# Patient Record
Sex: MALE | Race: WHITE | NOT HISPANIC OR LATINO | Employment: FULL TIME | URBAN - METROPOLITAN AREA
[De-identification: names, ages, dates, MRNs, and addresses within clinical notes are randomized per-mention and may not be internally consistent; named-entity substitution may affect disease eponyms.]

---

## 2018-04-05 ENCOUNTER — SURGERY - HEALTHEAST (OUTPATIENT)
Dept: CARDIOLOGY | Facility: CLINIC | Age: 53
End: 2018-04-05

## 2018-04-05 ENCOUNTER — HOSPITAL ENCOUNTER (OUTPATIENT)
Dept: LAB | Age: 53
Setting detail: SPECIMEN
Discharge: HOME | End: 2018-04-05

## 2018-04-05 ENCOUNTER — OFFICE VISIT - HEALTHEAST (OUTPATIENT)
Dept: FAMILY MEDICINE | Facility: CLINIC | Age: 53
End: 2018-04-05

## 2018-04-05 ENCOUNTER — COMMUNICATION - HEALTHEAST (OUTPATIENT)
Dept: FAMILY MEDICINE | Facility: CLINIC | Age: 53
End: 2018-04-05

## 2018-04-05 ENCOUNTER — RECORDS - HEALTHEAST (OUTPATIENT)
Dept: GENERAL RADIOLOGY | Facility: CLINIC | Age: 53
End: 2018-04-05

## 2018-04-05 DIAGNOSIS — R53.83 FATIGUE: ICD-10-CM

## 2018-04-05 DIAGNOSIS — R06.02 SHORTNESS OF BREATH: ICD-10-CM

## 2018-04-05 DIAGNOSIS — R05.9 COUGH: ICD-10-CM

## 2018-04-05 DIAGNOSIS — R09.89 PULMONARY CONGESTION: ICD-10-CM

## 2018-04-05 DIAGNOSIS — R94.31 ABNORMAL EKG: ICD-10-CM

## 2018-04-05 DIAGNOSIS — R06.02 SOB (SHORTNESS OF BREATH): ICD-10-CM

## 2018-04-05 LAB
ALBUMIN SERPL-MCNC: 2.6 G/DL (ref 3.5–5)
ALP SERPL-CCNC: 213 U/L (ref 45–120)
ALT SERPL W P-5'-P-CCNC: 47 U/L (ref 0–45)
ANION GAP SERPL CALCULATED.3IONS-SCNC: 11 MMOL/L (ref 5–18)
AST SERPL W P-5'-P-CCNC: 33 U/L (ref 0–40)
ATRIAL RATE - MUSE: 104 BPM
BASOPHILS # BLD AUTO: 0.1 THOU/UL (ref 0–0.2)
BASOPHILS NFR BLD AUTO: 1 % (ref 0–2)
BILIRUB SERPL-MCNC: 1.4 MG/DL (ref 0–1)
BUN SERPL-MCNC: 16 MG/DL (ref 8–22)
CALCIUM SERPL-MCNC: 9.1 MG/DL (ref 8.5–10.5)
CHLORIDE BLD-SCNC: 105 MMOL/L (ref 98–107)
CO2 SERPL-SCNC: 20 MMOL/L (ref 22–31)
CREAT SERPL-MCNC: 0.89 MG/DL (ref 0.7–1.3)
DIASTOLIC BLOOD PRESSURE - MUSE: NORMAL MMHG
EOSINOPHIL # BLD AUTO: 0.2 THOU/UL (ref 0–0.4)
EOSINOPHIL NFR BLD AUTO: 2 % (ref 0–6)
ERYTHROCYTE [DISTWIDTH] IN BLOOD BY AUTOMATED COUNT: 12.2 % (ref 11–14.5)
GFR SERPL CREATININE-BSD FRML MDRD: >60 ML/MIN/1.73M2
GLUCOSE BLD-MCNC: 90 MG/DL (ref 70–125)
HCT VFR BLD AUTO: 39.7 % (ref 40–54)
HGB BLD-MCNC: 13.3 G/DL (ref 14–18)
INTERPRETATION ECG - MUSE: NORMAL
LYMPHOCYTES # BLD AUTO: 2 THOU/UL (ref 0.8–4.4)
LYMPHOCYTES NFR BLD AUTO: 19 % (ref 20–40)
MCH RBC QN AUTO: 28.8 PG (ref 27–34)
MCHC RBC AUTO-ENTMCNC: 33.5 G/DL (ref 32–36)
MCV RBC AUTO: 86 FL (ref 80–100)
MONOCYTES # BLD AUTO: 0.9 THOU/UL (ref 0–0.9)
MONOCYTES NFR BLD AUTO: 9 % (ref 2–10)
NEUTROPHILS # BLD AUTO: 7.4 THOU/UL (ref 2–7.7)
NEUTROPHILS NFR BLD AUTO: 70 % (ref 50–70)
P AXIS - MUSE: 52 DEGREES
PLATELET # BLD AUTO: 462 THOU/UL (ref 140–440)
PMV BLD AUTO: 7.3 FL (ref 7–10)
POTASSIUM BLD-SCNC: 5 MMOL/L (ref 3.5–5)
PR INTERVAL - MUSE: 162 MS
PROT SERPL-MCNC: 6.3 G/DL (ref 6–8)
QRS DURATION - MUSE: 80 MS
QT - MUSE: 346 MS
QTC - MUSE: 454 MS
R AXIS - MUSE: 247 DEGREES
RBC # BLD AUTO: 4.62 MILL/UL (ref 4.4–6.2)
SODIUM SERPL-SCNC: 136 MMOL/L (ref 136–145)
SYSTOLIC BLOOD PRESSURE - MUSE: NORMAL MMHG
T AXIS - MUSE: 62 DEGREES
VENTRICULAR RATE- MUSE: 104 BPM
WBC: 10.6 THOU/UL (ref 4–11)

## 2018-04-05 ASSESSMENT — MIFFLIN-ST. JEOR: SCORE: 1758.38

## 2018-04-06 ENCOUNTER — COMMUNICATION - HEALTHEAST (OUTPATIENT)
Dept: CARDIOLOGY | Facility: CLINIC | Age: 53
End: 2018-04-06

## 2018-04-06 DIAGNOSIS — I24.9 ACS (ACUTE CORONARY SYNDROME) (H): ICD-10-CM

## 2018-04-06 DIAGNOSIS — I42.9 CARDIOMYOPATHY (H): ICD-10-CM

## 2018-04-06 ASSESSMENT — MIFFLIN-ST. JEOR: SCORE: 1738.45

## 2018-04-07 ASSESSMENT — MIFFLIN-ST. JEOR: SCORE: 1738.45

## 2018-04-10 ENCOUNTER — OFFICE VISIT - HEALTHEAST (OUTPATIENT)
Dept: FAMILY MEDICINE | Facility: CLINIC | Age: 53
End: 2018-04-10

## 2018-04-10 DIAGNOSIS — R04.0 NOSEBLEED: ICD-10-CM

## 2018-04-10 DIAGNOSIS — Z23 NEED FOR VACCINATION: ICD-10-CM

## 2018-04-10 DIAGNOSIS — Z09 HOSPITAL DISCHARGE FOLLOW-UP: ICD-10-CM

## 2018-04-10 DIAGNOSIS — I50.21 ACUTE SYSTOLIC CONGESTIVE HEART FAILURE (H): ICD-10-CM

## 2018-04-10 DIAGNOSIS — I21.3 STEMI (ST ELEVATION MYOCARDIAL INFARCTION) (H): ICD-10-CM

## 2018-04-10 ASSESSMENT — MIFFLIN-ST. JEOR: SCORE: 1691.39

## 2018-04-19 ENCOUNTER — COMMUNICATION - HEALTHEAST (OUTPATIENT)
Dept: TELEHEALTH | Facility: CLINIC | Age: 53
End: 2018-04-19

## 2018-04-19 ENCOUNTER — OFFICE VISIT - HEALTHEAST (OUTPATIENT)
Dept: CARDIOLOGY | Facility: CLINIC | Age: 53
End: 2018-04-19

## 2018-04-19 DIAGNOSIS — I25.5 ISCHEMIC CARDIOMYOPATHY: ICD-10-CM

## 2018-04-19 DIAGNOSIS — I21.02 ST ELEVATION MYOCARDIAL INFARCTION INVOLVING LEFT ANTERIOR DESCENDING (LAD) CORONARY ARTERY (H): ICD-10-CM

## 2018-04-19 DIAGNOSIS — I50.21 ACUTE SYSTOLIC CONGESTIVE HEART FAILURE (H): ICD-10-CM

## 2018-04-19 DIAGNOSIS — I24.9 ACS (ACUTE CORONARY SYNDROME) (H): ICD-10-CM

## 2018-04-19 DIAGNOSIS — I25.10 CORONARY ARTERY DISEASE DUE TO LIPID RICH PLAQUE: ICD-10-CM

## 2018-04-19 DIAGNOSIS — E78.5 DYSLIPIDEMIA: ICD-10-CM

## 2018-04-19 DIAGNOSIS — I25.83 CORONARY ARTERY DISEASE DUE TO LIPID RICH PLAQUE: ICD-10-CM

## 2018-04-19 ASSESSMENT — MIFFLIN-ST. JEOR: SCORE: 1680.5

## 2018-04-20 ENCOUNTER — HOSPITAL ENCOUNTER (OUTPATIENT)
Dept: MRI IMAGING | Facility: HOSPITAL | Age: 53
Discharge: HOME | End: 2018-04-20
Attending: INTERNAL MEDICINE

## 2018-04-20 DIAGNOSIS — I24.9 ACS (ACUTE CORONARY SYNDROME) (H): ICD-10-CM

## 2018-04-20 DIAGNOSIS — I42.9 CARDIOMYOPATHY (H): ICD-10-CM

## 2018-04-20 LAB
CCTA EJECTION FRACTION: 37 %
CCTA INTERVENTRICULAR SETPUM: 0.9 CM (ref 0.6–1.1)
CCTA LEFT INTERNAL DIMENSION IN SYSTOLE: 4.3 CM (ref 2.1–4)
CCTA LEFT VENTRICULAR INTERNAL DIMENSION IN DIASTOLE: 5.5 CM (ref 3.5–6)
CCTA LEFT VENTRICULAR MASS: 199.32 G
CCTA POSTERIOR WALL: 1 CM (ref 0.6–1.1)
MR CARDIAC LEFT VENTRIAL CARDIAC INDEX: 2.2 L/MIN/M2 (ref 1.7–4.2)
MR CARDIAC LEFT VENTRICAL CARDIAC OUTPUT: 4.48 L/MIN (ref 2.8–8.8)
MR CARDIAC LEFT VENTRICULAR DIASTOLIC VOLUME INDEX: 124.56 ML/M2 (ref 47–92)
MR CARDIAC LEFT VENTRICULAR MASS INDEX: 94.01 G/M2 (ref 70–113)
MR CARDIAC LEFT VENTRICULAR MASS: 189 G (ref 118–238)
MR CARDIAC LEFT VENTRICULAR STROKE VOLUME INDEX: 27.06 ML/M2 (ref 32–62)
MR CARDIAC LEFT VENTRICULAR SYSTOLIC VOLUME INDEX: 97.5 ML/M2 (ref 13–30)
MR EJECTION FRACTION: 21.73 %
MR HEIGHT: 1.7 M
MR LEFT VENTRICULAR DYSTOLIC VOLUMEN: 250.4 ML (ref 77–195)
MR LEFT VENTRICULAR STROKE VOLUMEN: 54.4 ML (ref 51–133)
MR LEFT VENTRICULAR SYSTOLIC VOLUME: 196 ML (ref 19–72)
MR WEIGHT: 90.1 KG

## 2018-05-01 ENCOUNTER — COMMUNICATION - HEALTHEAST (OUTPATIENT)
Dept: CARDIOLOGY | Facility: CLINIC | Age: 53
End: 2018-05-01

## 2018-05-15 ENCOUNTER — OFFICE VISIT - HEALTHEAST (OUTPATIENT)
Dept: CARDIOLOGY | Facility: CLINIC | Age: 53
End: 2018-05-15

## 2018-05-15 DIAGNOSIS — I25.5 ISCHEMIC CARDIOMYOPATHY: ICD-10-CM

## 2018-05-15 DIAGNOSIS — I50.20 HEART FAILURE WITH REDUCED EJECTION FRACTION, NYHA CLASS II (H): ICD-10-CM

## 2018-05-15 ASSESSMENT — MIFFLIN-ST. JEOR: SCORE: 1668.14

## 2018-05-22 ENCOUNTER — COMMUNICATION - HEALTHEAST (OUTPATIENT)
Dept: CARDIOLOGY | Facility: CLINIC | Age: 53
End: 2018-05-22

## 2018-05-23 ENCOUNTER — HOSPITAL ENCOUNTER (OUTPATIENT)
Dept: CARDIOLOGY | Facility: CLINIC | Age: 53
Discharge: HOME | End: 2018-05-23
Attending: INTERNAL MEDICINE

## 2018-05-23 ENCOUNTER — OFFICE VISIT - HEALTHEAST (OUTPATIENT)
Dept: CARDIOLOGY | Facility: CLINIC | Age: 53
End: 2018-05-23

## 2018-05-23 DIAGNOSIS — I50.21 ACUTE SYSTOLIC CONGESTIVE HEART FAILURE (H): ICD-10-CM

## 2018-05-23 DIAGNOSIS — I25.10 CORONARY ARTERY DISEASE DUE TO LIPID RICH PLAQUE: ICD-10-CM

## 2018-05-23 DIAGNOSIS — I25.83 CORONARY ARTERY DISEASE DUE TO LIPID RICH PLAQUE: ICD-10-CM

## 2018-05-23 DIAGNOSIS — I25.5 ISCHEMIC CARDIOMYOPATHY: ICD-10-CM

## 2018-05-23 DIAGNOSIS — I50.22 CHRONIC SYSTOLIC HEART FAILURE (H): ICD-10-CM

## 2018-05-23 ASSESSMENT — MIFFLIN-ST. JEOR: SCORE: 1677.22

## 2018-05-31 ENCOUNTER — OFFICE VISIT - HEALTHEAST (OUTPATIENT)
Dept: FAMILY MEDICINE | Facility: CLINIC | Age: 53
End: 2018-05-31

## 2018-05-31 DIAGNOSIS — J32.9 SINUSITIS: ICD-10-CM

## 2018-06-01 ENCOUNTER — COMMUNICATION - HEALTHEAST (OUTPATIENT)
Dept: CARDIOLOGY | Facility: CLINIC | Age: 53
End: 2018-06-01

## 2018-06-05 ENCOUNTER — HOSPITAL ENCOUNTER (OUTPATIENT)
Dept: LAB | Age: 53
Setting detail: SPECIMEN
Discharge: HOME | End: 2018-06-05

## 2018-06-05 ENCOUNTER — OFFICE VISIT - HEALTHEAST (OUTPATIENT)
Dept: CARDIOLOGY | Facility: CLINIC | Age: 53
End: 2018-06-05

## 2018-06-05 ENCOUNTER — AMBULATORY - HEALTHEAST (OUTPATIENT)
Dept: CARDIOLOGY | Facility: CLINIC | Age: 53
End: 2018-06-05

## 2018-06-05 DIAGNOSIS — I25.10 CAD (CORONARY ARTERY DISEASE): ICD-10-CM

## 2018-06-05 DIAGNOSIS — Z00.6 EXAMINATION OF PARTICIPANT IN CLINICAL TRIAL: ICD-10-CM

## 2018-06-05 DIAGNOSIS — I21.02 ST ELEVATION MYOCARDIAL INFARCTION INVOLVING LEFT ANTERIOR DESCENDING (LAD) CORONARY ARTERY (H): ICD-10-CM

## 2018-06-05 DIAGNOSIS — I10 BENIGN ESSENTIAL HYPERTENSION: ICD-10-CM

## 2018-06-05 DIAGNOSIS — I25.5 ISCHEMIC CARDIOMYOPATHY: ICD-10-CM

## 2018-06-05 DIAGNOSIS — I50.20 HEART FAILURE WITH REDUCED EJECTION FRACTION, NYHA CLASS II (H): ICD-10-CM

## 2018-06-05 DIAGNOSIS — I50.22 CHRONIC SYSTOLIC HEART FAILURE (H): Primary | ICD-10-CM

## 2018-06-05 DIAGNOSIS — I50.22 CHRONIC SYSTOLIC HEART FAILURE (H): ICD-10-CM

## 2018-06-05 DIAGNOSIS — E78.00 PURE HYPERCHOLESTEROLEMIA: ICD-10-CM

## 2018-06-05 DIAGNOSIS — Z95.810 ICD (IMPLANTABLE CARDIOVERTER-DEFIBRILLATOR), SINGLE, IN SITU: ICD-10-CM

## 2018-06-05 DIAGNOSIS — I25.118 CORONARY ARTERY DISEASE OF NATIVE HEART WITH STABLE ANGINA PECTORIS, UNSPECIFIED VESSEL OR LESION TYPE (H): ICD-10-CM

## 2018-06-05 LAB
ANION GAP SERPL CALCULATED.3IONS-SCNC: 8 MMOL/L (ref 5–18)
BUN SERPL-MCNC: 13 MG/DL (ref 8–22)
CALCIUM SERPL-MCNC: 9.7 MG/DL (ref 8.5–10.5)
CHLORIDE BLD-SCNC: 107 MMOL/L (ref 98–107)
CO2 SERPL-SCNC: 24 MMOL/L (ref 22–31)
CREAT SERPL-MCNC: 0.91 MG/DL (ref 0.7–1.3)
GFR SERPL CREATININE-BSD FRML MDRD: >60 ML/MIN/1.73M2
GLUCOSE BLD-MCNC: 99 MG/DL (ref 70–125)
PLATELET # BLD AUTO: 354 THOU/UL (ref 140–440)
POTASSIUM BLD-SCNC: 4.8 MMOL/L (ref 3.5–5)
SODIUM SERPL-SCNC: 139 MMOL/L (ref 136–145)

## 2018-06-05 RX ORDER — SENNOSIDES 8.6 MG
650 CAPSULE ORAL EVERY 8 HOURS PRN
Status: SHIPPED | COMMUNITY
Start: 2018-04-06 | End: 2022-05-16

## 2018-06-05 ASSESSMENT — MIFFLIN-ST. JEOR: SCORE: 1659.07

## 2018-06-08 ENCOUNTER — COMMUNICATION - HEALTHEAST (OUTPATIENT)
Dept: CARDIOLOGY | Facility: CLINIC | Age: 53
End: 2018-06-08

## 2018-06-17 ENCOUNTER — AMBULATORY - HEALTHEAST (OUTPATIENT)
Dept: CARDIOLOGY | Facility: CLINIC | Age: 53
End: 2018-06-17

## 2018-06-18 ENCOUNTER — SURGERY - HEALTHEAST (OUTPATIENT)
Dept: CARDIOLOGY | Facility: CLINIC | Age: 53
End: 2018-06-18

## 2018-06-18 ENCOUNTER — AMBULATORY - HEALTHEAST (OUTPATIENT)
Dept: CARDIOLOGY | Facility: CLINIC | Age: 53
End: 2018-06-18

## 2018-06-18 DIAGNOSIS — I25.5 ISCHEMIC CARDIOMYOPATHY: ICD-10-CM

## 2018-06-19 ENCOUNTER — COMMUNICATION - HEALTHEAST (OUTPATIENT)
Dept: CARDIOLOGY | Facility: CLINIC | Age: 53
End: 2018-06-19

## 2018-06-20 ENCOUNTER — COMMUNICATION - HEALTHEAST (OUTPATIENT)
Dept: CARDIOLOGY | Facility: CLINIC | Age: 53
End: 2018-06-20

## 2018-06-25 ENCOUNTER — OFFICE VISIT - HEALTHEAST (OUTPATIENT)
Dept: CARDIOLOGY | Facility: CLINIC | Age: 53
End: 2018-06-25

## 2018-06-25 DIAGNOSIS — I21.02 ST ELEVATION MYOCARDIAL INFARCTION INVOLVING LEFT ANTERIOR DESCENDING (LAD) CORONARY ARTERY (H): ICD-10-CM

## 2018-06-25 DIAGNOSIS — I25.5 ISCHEMIC CARDIOMYOPATHY: ICD-10-CM

## 2018-06-25 DIAGNOSIS — E78.00 PURE HYPERCHOLESTEROLEMIA: ICD-10-CM

## 2018-06-25 DIAGNOSIS — I50.22 CHRONIC SYSTOLIC HEART FAILURE (H): ICD-10-CM

## 2018-06-25 DIAGNOSIS — I25.10 CORONARY ARTERY DISEASE DUE TO LIPID RICH PLAQUE: ICD-10-CM

## 2018-06-25 DIAGNOSIS — I25.83 CORONARY ARTERY DISEASE DUE TO LIPID RICH PLAQUE: ICD-10-CM

## 2018-06-25 ASSESSMENT — MIFFLIN-ST. JEOR: SCORE: 1659.07

## 2018-07-02 ENCOUNTER — HOSPITAL ENCOUNTER (OUTPATIENT)
Dept: CARDIOLOGY | Facility: CLINIC | Age: 53
Discharge: HOME | End: 2018-07-02
Attending: INTERNAL MEDICINE

## 2018-07-02 DIAGNOSIS — I25.83 CORONARY ARTERY DISEASE DUE TO LIPID RICH PLAQUE: ICD-10-CM

## 2018-07-02 DIAGNOSIS — I21.02 ST ELEVATION MYOCARDIAL INFARCTION INVOLVING LEFT ANTERIOR DESCENDING (LAD) CORONARY ARTERY (H): ICD-10-CM

## 2018-07-02 DIAGNOSIS — I25.5 ISCHEMIC CARDIOMYOPATHY: ICD-10-CM

## 2018-07-02 DIAGNOSIS — I25.10 CORONARY ARTERY DISEASE DUE TO LIPID RICH PLAQUE: ICD-10-CM

## 2018-07-02 LAB
BSA FOR ECHO PROCEDURE: 2.03 M2
CV ECHO HEIGHT: 67 IN
CV ECHO WEIGHT: 193 LBS
EJECTION FRACTION: 28 % (ref 55–75)
FRACTIONAL SHORTENING: 17.6 % (ref 28–44)
INTERVENTRICULAR SEPTUM IN END DIASTOLE: 1.1 CM (ref 0.6–1)
IVS/PW RATIO: 1
LA AREA 1: 22.9 CM2
LA AREA 2: 24.4 CM2
LEFT ATRIUM LENGTH: 5.46 CM
LEFT ATRIUM SIZE: 4.6 CM
LEFT ATRIUM VOLUME INDEX: 42.9 ML/M2
LEFT ATRIUM VOLUME: 87 ML
LEFT VENTRICLE DIASTOLIC VOLUME INDEX: 89.7 CM3/M2 (ref 34–74)
LEFT VENTRICLE DIASTOLIC VOLUME: 182 CM3 (ref 62–150)
LEFT VENTRICLE HEART RATE: 79 BPM
LEFT VENTRICLE MASS INDEX: 105.4 G/M2
LEFT VENTRICLE SYSTOLIC VOLUME INDEX: 64.5 CM3/M2 (ref 11–31)
LEFT VENTRICLE SYSTOLIC VOLUME: 131 CM3 (ref 21–61)
LEFT VENTRICULAR INTERNAL DIMENSION IN DIASTOLE: 5.1 CM (ref 4.2–5.8)
LEFT VENTRICULAR INTERNAL DIMENSION IN SYSTOLE: 4.2 CM (ref 2.5–4)
LEFT VENTRICULAR MASS: 213.9 G
LEFT VENTRICULAR POSTERIOR WALL IN END DIASTOLE: 1.1 CM (ref 0.6–1)
NUC REST DIASTOLIC VOLUME INDEX: 3088 LBS
NUC REST SYSTOLIC VOLUME INDEX: 67 IN
RIGHT VENTRICULAR INTERNAL DIMENSION IN DYSTOLE: 3.8 CM

## 2018-07-02 ASSESSMENT — MIFFLIN-ST. JEOR: SCORE: 1659.07

## 2018-07-05 ENCOUNTER — ANESTHESIA - HEALTHEAST (OUTPATIENT)
Dept: CARDIOLOGY | Facility: CLINIC | Age: 53
End: 2018-07-05

## 2018-07-06 ENCOUNTER — SURGERY - HEALTHEAST (OUTPATIENT)
Dept: CARDIOLOGY | Facility: CLINIC | Age: 53
End: 2018-07-06

## 2018-07-06 ASSESSMENT — MIFFLIN-ST. JEOR: SCORE: 1657.26

## 2018-07-17 ENCOUNTER — COMMUNICATION - HEALTHEAST (OUTPATIENT)
Dept: CARDIOLOGY | Facility: CLINIC | Age: 53
End: 2018-07-17

## 2018-07-17 ENCOUNTER — OFFICE VISIT - HEALTHEAST (OUTPATIENT)
Dept: CARDIOLOGY | Facility: CLINIC | Age: 53
End: 2018-07-17

## 2018-07-17 ENCOUNTER — AMBULATORY - HEALTHEAST (OUTPATIENT)
Dept: CARDIOLOGY | Facility: CLINIC | Age: 53
End: 2018-07-17

## 2018-07-17 DIAGNOSIS — I25.118 CORONARY ARTERY DISEASE OF NATIVE HEART WITH STABLE ANGINA PECTORIS, UNSPECIFIED VESSEL OR LESION TYPE (H): ICD-10-CM

## 2018-07-17 DIAGNOSIS — I25.5 ISCHEMIC CARDIOMYOPATHY: ICD-10-CM

## 2018-07-17 DIAGNOSIS — I50.22 CHRONIC SYSTOLIC HEART FAILURE (H): ICD-10-CM

## 2018-07-17 DIAGNOSIS — Z95.810 ICD (IMPLANTABLE CARDIOVERTER-DEFIBRILLATOR), SINGLE, IN SITU: ICD-10-CM

## 2018-07-17 LAB
HCC DEVICE COMMENTS: NORMAL
HCC DEVICE IMPLANTING PROVIDER: NORMAL
HCC DEVICE MANUFACTURE: NORMAL
HCC DEVICE MODEL: NORMAL
HCC DEVICE SERIAL NUMBER: NORMAL
HCC DEVICE TYPE: NORMAL

## 2018-07-17 ASSESSMENT — MIFFLIN-ST. JEOR: SCORE: 1690.82

## 2018-07-20 ENCOUNTER — COMMUNICATION - HEALTHEAST (OUTPATIENT)
Dept: CARDIOLOGY | Facility: CLINIC | Age: 53
End: 2018-07-20

## 2018-07-25 ENCOUNTER — AMBULATORY - HEALTHEAST (OUTPATIENT)
Dept: CARDIOLOGY | Facility: CLINIC | Age: 53
End: 2018-07-25

## 2018-07-25 ENCOUNTER — COMMUNICATION - HEALTHEAST (OUTPATIENT)
Dept: CARDIOLOGY | Facility: CLINIC | Age: 53
End: 2018-07-25

## 2018-07-30 ENCOUNTER — COMMUNICATION - HEALTHEAST (OUTPATIENT)
Dept: CARDIOLOGY | Facility: CLINIC | Age: 53
End: 2018-07-30

## 2018-08-07 ENCOUNTER — AMBULATORY - HEALTHEAST (OUTPATIENT)
Dept: CARDIOLOGY | Facility: CLINIC | Age: 53
End: 2018-08-07

## 2018-08-07 ENCOUNTER — COMMUNICATION - HEALTHEAST (OUTPATIENT)
Dept: CARDIOLOGY | Facility: CLINIC | Age: 53
End: 2018-08-07

## 2018-08-07 DIAGNOSIS — I25.5 ISCHEMIC CARDIOMYOPATHY: ICD-10-CM

## 2018-08-07 LAB
ANION GAP SERPL CALCULATED.3IONS-SCNC: 10 MMOL/L (ref 5–18)
BUN SERPL-MCNC: 28 MG/DL (ref 8–22)
CALCIUM SERPL-MCNC: 9.5 MG/DL (ref 8.5–10.5)
CHLORIDE BLD-SCNC: 106 MMOL/L (ref 98–107)
CO2 SERPL-SCNC: 23 MMOL/L (ref 22–31)
CREAT SERPL-MCNC: 1.13 MG/DL (ref 0.7–1.3)
GFR SERPL CREATININE-BSD FRML MDRD: >60 ML/MIN/1.73M2
GLUCOSE BLD-MCNC: 114 MG/DL (ref 70–125)
POTASSIUM BLD-SCNC: 4.7 MMOL/L (ref 3.5–5)
SODIUM SERPL-SCNC: 139 MMOL/L (ref 136–145)

## 2018-08-08 ENCOUNTER — AMBULATORY - HEALTHEAST (OUTPATIENT)
Dept: CARDIOLOGY | Facility: CLINIC | Age: 53
End: 2018-08-08

## 2018-08-08 DIAGNOSIS — Z95.810 ICD (IMPLANTABLE CARDIOVERTER-DEFIBRILLATOR), SINGLE, IN SITU: ICD-10-CM

## 2018-08-28 ENCOUNTER — OFFICE VISIT - HEALTHEAST (OUTPATIENT)
Dept: CARDIOLOGY | Facility: CLINIC | Age: 53
End: 2018-08-28

## 2018-08-28 DIAGNOSIS — I25.5 ISCHEMIC CARDIOMYOPATHY: ICD-10-CM

## 2018-08-28 DIAGNOSIS — I95.9 HYPOTENSION, UNSPECIFIED HYPOTENSION TYPE: ICD-10-CM

## 2018-08-28 DIAGNOSIS — Z95.810 ICD (IMPLANTABLE CARDIOVERTER-DEFIBRILLATOR), SINGLE, IN SITU: ICD-10-CM

## 2018-08-28 DIAGNOSIS — I50.22 CHRONIC SYSTOLIC HEART FAILURE (H): ICD-10-CM

## 2018-08-28 ASSESSMENT — MIFFLIN-ST. JEOR: SCORE: 1703.29

## 2018-10-02 ENCOUNTER — AMBULATORY - HEALTHEAST (OUTPATIENT)
Dept: CARDIOLOGY | Facility: CLINIC | Age: 53
End: 2018-10-02

## 2018-10-02 ENCOUNTER — OFFICE VISIT - HEALTHEAST (OUTPATIENT)
Dept: CARDIOLOGY | Facility: CLINIC | Age: 53
End: 2018-10-02

## 2018-10-02 DIAGNOSIS — Z95.810 ICD (IMPLANTABLE CARDIOVERTER-DEFIBRILLATOR), SINGLE, IN SITU: ICD-10-CM

## 2018-10-02 DIAGNOSIS — I21.02 ST ELEVATION MYOCARDIAL INFARCTION INVOLVING LEFT ANTERIOR DESCENDING (LAD) CORONARY ARTERY (H): ICD-10-CM

## 2018-10-02 DIAGNOSIS — I25.118 CORONARY ARTERY DISEASE OF NATIVE HEART WITH STABLE ANGINA PECTORIS, UNSPECIFIED VESSEL OR LESION TYPE (H): ICD-10-CM

## 2018-10-02 DIAGNOSIS — E78.00 PURE HYPERCHOLESTEROLEMIA: ICD-10-CM

## 2018-10-02 DIAGNOSIS — F17.201 TOBACCO DEPENDENCE IN REMISSION: ICD-10-CM

## 2018-10-02 DIAGNOSIS — I50.22 CHRONIC SYSTOLIC HEART FAILURE (H): ICD-10-CM

## 2018-10-02 DIAGNOSIS — I25.5 ISCHEMIC CARDIOMYOPATHY: ICD-10-CM

## 2018-10-02 ASSESSMENT — MIFFLIN-ST. JEOR: SCORE: 1710.55

## 2018-11-12 ENCOUNTER — AMBULATORY - HEALTHEAST (OUTPATIENT)
Dept: CARDIOLOGY | Facility: CLINIC | Age: 53
End: 2018-11-12

## 2018-11-13 ENCOUNTER — OFFICE VISIT - HEALTHEAST (OUTPATIENT)
Dept: CARDIOLOGY | Facility: CLINIC | Age: 53
End: 2018-11-13

## 2018-11-13 ENCOUNTER — AMBULATORY - HEALTHEAST (OUTPATIENT)
Dept: CARDIOLOGY | Facility: CLINIC | Age: 53
End: 2018-11-13

## 2018-11-13 DIAGNOSIS — Z95.810 ICD (IMPLANTABLE CARDIOVERTER-DEFIBRILLATOR), SINGLE, IN SITU: ICD-10-CM

## 2018-11-13 DIAGNOSIS — I95.9 HYPOTENSION, UNSPECIFIED HYPOTENSION TYPE: ICD-10-CM

## 2018-11-13 DIAGNOSIS — I50.22 CHRONIC SYSTOLIC HEART FAILURE (H): ICD-10-CM

## 2018-11-13 DIAGNOSIS — I25.5 ISCHEMIC CARDIOMYOPATHY: ICD-10-CM

## 2018-11-13 ASSESSMENT — MIFFLIN-ST. JEOR: SCORE: 1721.43

## 2018-12-11 ENCOUNTER — OFFICE VISIT - HEALTHEAST (OUTPATIENT)
Dept: CARDIOLOGY | Facility: CLINIC | Age: 53
End: 2018-12-11

## 2018-12-11 ENCOUNTER — AMBULATORY - HEALTHEAST (OUTPATIENT)
Dept: CARDIOLOGY | Facility: CLINIC | Age: 53
End: 2018-12-11

## 2018-12-11 DIAGNOSIS — I95.9 HYPOTENSION, UNSPECIFIED HYPOTENSION TYPE: ICD-10-CM

## 2018-12-11 DIAGNOSIS — I50.22 CHRONIC SYSTOLIC HEART FAILURE (H): ICD-10-CM

## 2018-12-11 DIAGNOSIS — I25.5 ISCHEMIC CARDIOMYOPATHY: ICD-10-CM

## 2018-12-11 ASSESSMENT — MIFFLIN-ST. JEOR: SCORE: 1735.04

## 2018-12-26 ENCOUNTER — OFFICE VISIT - HEALTHEAST (OUTPATIENT)
Dept: CARDIOLOGY | Facility: CLINIC | Age: 53
End: 2018-12-26

## 2018-12-26 ENCOUNTER — AMBULATORY - HEALTHEAST (OUTPATIENT)
Dept: CARDIOLOGY | Facility: CLINIC | Age: 53
End: 2018-12-26

## 2018-12-26 ENCOUNTER — HOSPITAL ENCOUNTER (OUTPATIENT)
Dept: CARDIOLOGY | Facility: CLINIC | Age: 53
Discharge: HOME | End: 2018-12-26
Attending: INTERNAL MEDICINE

## 2018-12-26 DIAGNOSIS — Z95.810 ICD (IMPLANTABLE CARDIOVERTER-DEFIBRILLATOR), SINGLE, IN SITU: ICD-10-CM

## 2018-12-26 DIAGNOSIS — I25.5 ISCHEMIC CARDIOMYOPATHY: ICD-10-CM

## 2018-12-26 DIAGNOSIS — I50.22 CHRONIC SYSTOLIC HEART FAILURE (H): ICD-10-CM

## 2018-12-26 DIAGNOSIS — I21.02 ST ELEVATION MYOCARDIAL INFARCTION INVOLVING LEFT ANTERIOR DESCENDING (LAD) CORONARY ARTERY (H): ICD-10-CM

## 2018-12-26 DIAGNOSIS — I25.118 CORONARY ARTERY DISEASE OF NATIVE HEART WITH STABLE ANGINA PECTORIS, UNSPECIFIED VESSEL OR LESION TYPE (H): ICD-10-CM

## 2018-12-26 LAB
ALBUMIN SERPL-MCNC: 3.5 G/DL (ref 3.5–5)
ALBUMIN UR-MCNC: NEGATIVE MG/DL
ALP SERPL-CCNC: 151 U/L (ref 45–120)
ALT SERPL W P-5'-P-CCNC: 42 U/L (ref 0–45)
ANION GAP SERPL CALCULATED.3IONS-SCNC: 7 MMOL/L (ref 5–18)
APPEARANCE UR: CLEAR
AST SERPL W P-5'-P-CCNC: 34 U/L (ref 0–40)
BASOPHILS # BLD AUTO: 0.1 THOU/UL (ref 0–0.2)
BASOPHILS NFR BLD AUTO: 1 % (ref 0–2)
BILIRUB SERPL-MCNC: 1 MG/DL (ref 0–1)
BILIRUB UR QL STRIP: NEGATIVE
BUN SERPL-MCNC: 13 MG/DL (ref 8–22)
CALCIUM SERPL-MCNC: 9.3 MG/DL (ref 8.5–10.5)
CHLORIDE BLD-SCNC: 109 MMOL/L (ref 98–107)
CK SERPL-CCNC: 174 U/L (ref 30–190)
CO2 SERPL-SCNC: 24 MMOL/L (ref 22–31)
COLOR UR AUTO: NORMAL
CREAT SERPL-MCNC: 0.87 MG/DL (ref 0.7–1.3)
EOSINOPHIL # BLD AUTO: 0.4 THOU/UL (ref 0–0.4)
EOSINOPHIL NFR BLD AUTO: 4 % (ref 0–6)
ERYTHROCYTE [DISTWIDTH] IN BLOOD BY AUTOMATED COUNT: 14.4 % (ref 11–14.5)
GFR SERPL CREATININE-BSD FRML MDRD: >60 ML/MIN/1.73M2
GLUCOSE BLD-MCNC: 86 MG/DL (ref 70–125)
GLUCOSE UR STRIP-MCNC: NEGATIVE MG/DL
HCT VFR BLD AUTO: 35.7 % (ref 40–54)
HGB BLD-MCNC: 11.4 G/DL (ref 14–18)
HGB UR QL STRIP: NEGATIVE
KETONES UR STRIP-MCNC: NEGATIVE MG/DL
LEUKOCYTE ESTERASE UR QL STRIP: NEGATIVE
LYMPHOCYTES # BLD AUTO: 2.6 THOU/UL (ref 0.8–4.4)
LYMPHOCYTES NFR BLD AUTO: 26 % (ref 20–40)
MCH RBC QN AUTO: 27.6 PG (ref 27–34)
MCHC RBC AUTO-ENTMCNC: 31.9 G/DL (ref 32–36)
MCV RBC AUTO: 86 FL (ref 80–100)
MONOCYTES # BLD AUTO: 0.9 THOU/UL (ref 0–0.9)
MONOCYTES NFR BLD AUTO: 9 % (ref 2–10)
NEUTROPHILS # BLD AUTO: 5.9 THOU/UL (ref 2–7.7)
NEUTROPHILS NFR BLD AUTO: 60 % (ref 50–70)
NITRATE UR QL: NEGATIVE
PH UR STRIP: 5 [PH] (ref 4.5–8)
PLATELET # BLD AUTO: 311 THOU/UL (ref 140–440)
PMV BLD AUTO: 9.9 FL (ref 8.5–12.5)
POTASSIUM BLD-SCNC: 3.9 MMOL/L (ref 3.5–5)
PROT SERPL-MCNC: 6.7 G/DL (ref 6–8)
RBC # BLD AUTO: 4.13 MILL/UL (ref 4.4–6.2)
SODIUM SERPL-SCNC: 140 MMOL/L (ref 136–145)
SP GR UR STRIP: 1.01 (ref 1–1.03)
TROPONIN I SERPL-MCNC: <0.01 NG/ML (ref 0–0.29)
UROBILINOGEN UR STRIP-ACNC: NORMAL
WBC: 10 THOU/UL (ref 4–11)

## 2018-12-26 ASSESSMENT — MIFFLIN-ST. JEOR: SCORE: 1731.65

## 2018-12-27 LAB
AORTIC ROOT: 3.2 CM
AORTIC VALVE MEAN VELOCITY: 88.6 CM/S
AV DIMENSIONLESS INDEX VTI: 0.8
AV MEAN GRADIENT: 4 MMHG
AV PEAK GRADIENT: 6 MMHG
AV VALVE AREA: 2.6 CM2
AV VELOCITY RATIO: 0.8
BSA FOR ECHO PROCEDURE: 2.11 M2
CV BLOOD PRESSURE: ABNORMAL MMHG
CV ECHO HEIGHT: 67 IN
CV ECHO WEIGHT: 209 LBS
DOP CALC AO PEAK VEL: 122 CM/S
DOP CALC AO VTI: 26.1 CM
DOP CALC LVOT AREA: 3.14 CM2
DOP CALC LVOT DIAMETER: 2 CM
DOP CALC LVOT PEAK VEL: 98.8 CM/S
DOP CALC LVOT STROKE VOLUME: 67.2 CM3
DOP CALC MV VTI: 38.9 CM
DOP CALCLVOT PEAK VEL VTI: 21.4 CM
ECHO EJECTION FRACTION ESTIMATED: 25 %
EJECTION FRACTION: 30 % (ref 55–75)
FRACTIONAL SHORTENING: 14 % (ref 28–44)
INTERVENTRICULAR SEPTUM IN END DIASTOLE: 0.95 CM (ref 0.6–1)
IVS/PW RATIO: 0.9
LA AREA 1: 24.2 CM2
LA AREA 2: 23.4 CM2
LEFT ATRIUM LENGTH: 5.3 CM
LEFT ATRIUM SIZE: 4.7 CM
LEFT ATRIUM VOLUME INDEX: 43 ML/M2
LEFT ATRIUM VOLUME: 90.8 ML
LEFT VENTRICLE CARDIAC INDEX: 2 L/MIN/M2
LEFT VENTRICLE CARDIAC OUTPUT: 4.2 L/MIN
LEFT VENTRICLE DIASTOLIC VOLUME INDEX: 100 CM3/M2 (ref 34–74)
LEFT VENTRICLE DIASTOLIC VOLUME: 211 CM3 (ref 62–150)
LEFT VENTRICLE HEART RATE: 62 BPM
LEFT VENTRICLE MASS INDEX: 97.9 G/M2
LEFT VENTRICLE SYSTOLIC VOLUME INDEX: 69.7 CM3/M2 (ref 11–31)
LEFT VENTRICLE SYSTOLIC VOLUME: 147 CM3 (ref 21–61)
LEFT VENTRICULAR INTERNAL DIMENSION IN DIASTOLE: 5.36 CM (ref 4.2–5.8)
LEFT VENTRICULAR INTERNAL DIMENSION IN SYSTOLE: 4.61 CM (ref 2.5–4)
LEFT VENTRICULAR MASS: 206.5 G
LEFT VENTRICULAR OUTFLOW TRACT MEAN GRADIENT: 2 MMHG
LEFT VENTRICULAR OUTFLOW TRACT MEAN VELOCITY: 68.4 CM/S
LEFT VENTRICULAR OUTFLOW TRACT PEAK GRADIENT: 4 MMHG
LEFT VENTRICULAR POSTERIOR WALL IN END DIASTOLE: 1.07 CM (ref 0.6–1)
LV STROKE VOLUME INDEX: 31.8 ML/M2
MITRAL VALVE DECELERATION SLOPE: 4550 MM/S2
MITRAL VALVE E/A RATIO: 2.3
MITRAL VALVE MEAN INFLOW VELOCITY: 59.5 CM/S
MITRAL VALVE PEAK VELOCITY: 120 CM/S
MITRAL VALVE PRESSURE HALF-TIME: 79 MS
MV AREA VTI: 1.73 CM2
MV AVERAGE E/E' RATIO: 16.4 CM/S
MV DECELERATION TIME: 203 MS
MV E'TISSUE VEL-LAT: 6.53 CM/S
MV E'TISSUE VEL-MED: 5.33 CM/S
MV LATERAL E/E' RATIO: 14.9
MV MEAN GRADIENT: 2 MMHG
MV MEDIAL E/E' RATIO: 18.2
MV PEAK A VELOCITY: 42.2 CM/S
MV PEAK E VELOCITY: 97 CM/S
MV PEAK GRADIENT: 5.8 MMHG
MV VALVE AREA BY CONTINUITY EQUATION: 1.7 CM2
MV VALVE AREA PRESSURE 1/2 METHOD: 2.8 CM2
NUC REST DIASTOLIC VOLUME INDEX: 3344 LBS
NUC REST SYSTOLIC VOLUME INDEX: 67 IN
TRICUSPID REGURGITATION PEAK PRESSURE GRADIENT: 30 MMHG
TRICUSPID VALVE ANULAR PLANE SYSTOLIC EXCURSION: 2.6 CM
TRICUSPID VALVE PEAK REGURGITANT VELOCITY: 274 CM/S

## 2018-12-31 ENCOUNTER — AMBULATORY - HEALTHEAST (OUTPATIENT)
Dept: CARDIOLOGY | Facility: CLINIC | Age: 53
End: 2018-12-31

## 2018-12-31 DIAGNOSIS — I50.9 CHF (CONGESTIVE HEART FAILURE) (H): ICD-10-CM

## 2018-12-31 DIAGNOSIS — I34.0 MITRAL VALVE REGURGITATION: ICD-10-CM

## 2019-01-03 ENCOUNTER — AMBULATORY - HEALTHEAST (OUTPATIENT)
Dept: CARDIOLOGY | Facility: CLINIC | Age: 54
End: 2019-01-03

## 2019-01-03 DIAGNOSIS — Z00.6 EXAMINATION OF PARTICIPANT OR CONTROL IN CLINICAL RESEARCH: ICD-10-CM

## 2019-01-07 ENCOUNTER — AMBULATORY - HEALTHEAST (OUTPATIENT)
Dept: CARDIOLOGY | Facility: CLINIC | Age: 54
End: 2019-01-07

## 2019-01-07 DIAGNOSIS — Z95.810 ICD (IMPLANTABLE CARDIOVERTER-DEFIBRILLATOR), SINGLE, IN SITU: ICD-10-CM

## 2019-01-08 ENCOUNTER — COMMUNICATION - HEALTHEAST (OUTPATIENT)
Dept: CARDIOLOGY | Facility: CLINIC | Age: 54
End: 2019-01-08

## 2019-01-08 ENCOUNTER — AMBULATORY - HEALTHEAST (OUTPATIENT)
Dept: CARDIOLOGY | Facility: CLINIC | Age: 54
End: 2019-01-08

## 2019-01-25 ENCOUNTER — AMBULATORY - HEALTHEAST (OUTPATIENT)
Dept: CARDIOLOGY | Facility: CLINIC | Age: 54
End: 2019-01-25

## 2019-01-28 ENCOUNTER — RECORDS - HEALTHEAST (OUTPATIENT)
Dept: ADMINISTRATIVE | Facility: OTHER | Age: 54
End: 2019-01-28

## 2019-01-29 ENCOUNTER — AMBULATORY - HEALTHEAST (OUTPATIENT)
Dept: CARDIOLOGY | Facility: CLINIC | Age: 54
End: 2019-01-29

## 2019-01-29 ENCOUNTER — OFFICE VISIT - HEALTHEAST (OUTPATIENT)
Dept: CARDIOLOGY | Facility: CLINIC | Age: 54
End: 2019-01-29

## 2019-01-29 DIAGNOSIS — I25.5 ISCHEMIC CARDIOMYOPATHY: ICD-10-CM

## 2019-01-29 DIAGNOSIS — Z95.810 ICD (IMPLANTABLE CARDIOVERTER-DEFIBRILLATOR), SINGLE, IN SITU: ICD-10-CM

## 2019-01-29 DIAGNOSIS — I50.22 CHRONIC SYSTOLIC HEART FAILURE (H): ICD-10-CM

## 2019-01-29 LAB
ALBUMIN SERPL-MCNC: 3.5 G/DL (ref 3.5–5)
ALBUMIN UR-MCNC: NEGATIVE MG/DL
ALP SERPL-CCNC: 172 U/L (ref 45–120)
ALT SERPL W P-5'-P-CCNC: 60 U/L (ref 0–45)
ANION GAP SERPL CALCULATED.3IONS-SCNC: 6 MMOL/L (ref 5–18)
APPEARANCE UR: CLEAR
AST SERPL W P-5'-P-CCNC: 44 U/L (ref 0–40)
BASOPHILS # BLD AUTO: 0.1 THOU/UL (ref 0–0.2)
BASOPHILS NFR BLD AUTO: 1 % (ref 0–2)
BILIRUB SERPL-MCNC: 1 MG/DL (ref 0–1)
BILIRUB UR QL STRIP: NEGATIVE
BUN SERPL-MCNC: 18 MG/DL (ref 8–22)
CALCIUM SERPL-MCNC: 9.5 MG/DL (ref 8.5–10.5)
CHLORIDE BLD-SCNC: 108 MMOL/L (ref 98–107)
CK SERPL-CCNC: 158 U/L (ref 30–190)
CO2 SERPL-SCNC: 26 MMOL/L (ref 22–31)
COLOR UR AUTO: YELLOW
CREAT SERPL-MCNC: 0.92 MG/DL (ref 0.7–1.3)
EOSINOPHIL # BLD AUTO: 1 THOU/UL (ref 0–0.4)
EOSINOPHIL NFR BLD AUTO: 9 % (ref 0–6)
ERYTHROCYTE [DISTWIDTH] IN BLOOD BY AUTOMATED COUNT: 14.1 % (ref 11–14.5)
GFR SERPL CREATININE-BSD FRML MDRD: >60 ML/MIN/1.73M2
GLUCOSE BLD-MCNC: 94 MG/DL (ref 70–125)
GLUCOSE UR STRIP-MCNC: NEGATIVE MG/DL
HCC DEVICE COMMENTS: NORMAL
HCC DEVICE IMPLANTING PROVIDER: NORMAL
HCC DEVICE MANUFACTURE: NORMAL
HCC DEVICE MODEL: NORMAL
HCC DEVICE SERIAL NUMBER: NORMAL
HCC DEVICE TYPE: NORMAL
HCT VFR BLD AUTO: 35.3 % (ref 40–54)
HGB BLD-MCNC: 11.1 G/DL (ref 14–18)
HGB UR QL STRIP: NEGATIVE
KETONES UR STRIP-MCNC: NEGATIVE MG/DL
LEUKOCYTE ESTERASE UR QL STRIP: NEGATIVE
LYMPHOCYTES # BLD AUTO: 2 THOU/UL (ref 0.8–4.4)
LYMPHOCYTES NFR BLD AUTO: 17 % (ref 20–40)
MCH RBC QN AUTO: 27.8 PG (ref 27–34)
MCHC RBC AUTO-ENTMCNC: 31.4 G/DL (ref 32–36)
MCV RBC AUTO: 88 FL (ref 80–100)
MONOCYTES # BLD AUTO: 1.4 THOU/UL (ref 0–0.9)
MONOCYTES NFR BLD AUTO: 12 % (ref 2–10)
NEUTROPHILS # BLD AUTO: 7.1 THOU/UL (ref 2–7.7)
NEUTROPHILS NFR BLD AUTO: 61 % (ref 50–70)
NITRATE UR QL: NEGATIVE
PH UR STRIP: 5.5 [PH] (ref 4.5–8)
PLATELET # BLD AUTO: 293 THOU/UL (ref 140–440)
PMV BLD AUTO: 9.8 FL (ref 8.5–12.5)
POTASSIUM BLD-SCNC: 4.7 MMOL/L (ref 3.5–5)
PROT SERPL-MCNC: 6.9 G/DL (ref 6–8)
RBC # BLD AUTO: 4 MILL/UL (ref 4.4–6.2)
SODIUM SERPL-SCNC: 140 MMOL/L (ref 136–145)
SP GR UR STRIP: 1.02 (ref 1–1.03)
TROPONIN I SERPL-MCNC: 0.06 NG/ML (ref 0–0.29)
UROBILINOGEN UR STRIP-ACNC: NORMAL
WBC: 11.8 THOU/UL (ref 4–11)

## 2019-01-31 ENCOUNTER — AMBULATORY - HEALTHEAST (OUTPATIENT)
Dept: CARDIOLOGY | Facility: CLINIC | Age: 54
End: 2019-01-31

## 2019-01-31 DIAGNOSIS — I25.5 ISCHEMIC CARDIOMYOPATHY: ICD-10-CM

## 2019-02-04 ENCOUNTER — AMBULATORY - HEALTHEAST (OUTPATIENT)
Dept: CARDIOLOGY | Facility: CLINIC | Age: 54
End: 2019-02-04

## 2019-02-05 ENCOUNTER — OFFICE VISIT - HEALTHEAST (OUTPATIENT)
Dept: CARDIOLOGY | Facility: CLINIC | Age: 54
End: 2019-02-05

## 2019-02-05 ENCOUNTER — AMBULATORY - HEALTHEAST (OUTPATIENT)
Dept: CARDIOLOGY | Facility: CLINIC | Age: 54
End: 2019-02-05

## 2019-02-05 DIAGNOSIS — I50.22 CHRONIC SYSTOLIC HEART FAILURE (H): ICD-10-CM

## 2019-02-05 DIAGNOSIS — I25.5 ISCHEMIC CARDIOMYOPATHY: ICD-10-CM

## 2019-02-05 DIAGNOSIS — I95.9 HYPOTENSION, UNSPECIFIED HYPOTENSION TYPE: ICD-10-CM

## 2019-02-05 LAB
BASOPHILS # BLD AUTO: 0.1 THOU/UL (ref 0–0.2)
BASOPHILS NFR BLD AUTO: 1 % (ref 0–2)
EOSINOPHIL # BLD AUTO: 0.4 THOU/UL (ref 0–0.4)
EOSINOPHIL NFR BLD AUTO: 6 % (ref 0–6)
ERYTHROCYTE [DISTWIDTH] IN BLOOD BY AUTOMATED COUNT: 14.6 % (ref 11–14.5)
HCT VFR BLD AUTO: 33.4 % (ref 40–54)
HGB BLD-MCNC: 10.5 G/DL (ref 14–18)
LYMPHOCYTES # BLD AUTO: 1.6 THOU/UL (ref 0.8–4.4)
LYMPHOCYTES NFR BLD AUTO: 21 % (ref 20–40)
MCH RBC QN AUTO: 27.8 PG (ref 27–34)
MCHC RBC AUTO-ENTMCNC: 31.4 G/DL (ref 32–36)
MCV RBC AUTO: 88 FL (ref 80–100)
MONOCYTES # BLD AUTO: 1.3 THOU/UL (ref 0–0.9)
MONOCYTES NFR BLD AUTO: 17 % (ref 2–10)
NEUTROPHILS # BLD AUTO: 4.2 THOU/UL (ref 2–7.7)
NEUTROPHILS NFR BLD AUTO: 56 % (ref 50–70)
PLATELET # BLD AUTO: 264 THOU/UL (ref 140–440)
PMV BLD AUTO: 9.7 FL (ref 8.5–12.5)
RBC # BLD AUTO: 3.78 MILL/UL (ref 4.4–6.2)
WBC: 7.6 THOU/UL (ref 4–11)

## 2019-02-05 ASSESSMENT — MIFFLIN-ST. JEOR: SCORE: 1722.58

## 2019-02-21 ENCOUNTER — COMMUNICATION - HEALTHEAST (OUTPATIENT)
Dept: CARDIOLOGY | Facility: CLINIC | Age: 54
End: 2019-02-21

## 2019-03-19 ENCOUNTER — OFFICE VISIT - HEALTHEAST (OUTPATIENT)
Dept: CARDIOLOGY | Facility: CLINIC | Age: 54
End: 2019-03-19

## 2019-03-19 DIAGNOSIS — I25.118 CORONARY ARTERY DISEASE OF NATIVE HEART WITH STABLE ANGINA PECTORIS, UNSPECIFIED VESSEL OR LESION TYPE (H): ICD-10-CM

## 2019-03-19 DIAGNOSIS — I50.22 CHRONIC SYSTOLIC HEART FAILURE (H): ICD-10-CM

## 2019-03-19 DIAGNOSIS — Z95.810 ICD (IMPLANTABLE CARDIOVERTER-DEFIBRILLATOR), SINGLE, IN SITU: ICD-10-CM

## 2019-03-19 DIAGNOSIS — I25.5 ISCHEMIC CARDIOMYOPATHY: ICD-10-CM

## 2019-03-19 ASSESSMENT — MIFFLIN-ST. JEOR: SCORE: 1727.11

## 2019-04-05 ENCOUNTER — HOSPITAL ENCOUNTER (OUTPATIENT)
Dept: CARDIOLOGY | Facility: HOSPITAL | Age: 54
Discharge: HOME | End: 2019-04-05
Attending: INTERNAL MEDICINE

## 2019-04-05 DIAGNOSIS — I34.0 MITRAL VALVE REGURGITATION: ICD-10-CM

## 2019-04-05 DIAGNOSIS — I50.9 CHF (CONGESTIVE HEART FAILURE) (H): ICD-10-CM

## 2019-04-05 ASSESSMENT — MIFFLIN-ST. JEOR: SCORE: 1727.11

## 2019-04-08 LAB
AORTIC ROOT: 3.6 CM
AORTIC VALVE MEAN VELOCITY: 99.4 CM/S
AV DIMENSIONLESS INDEX VTI: 0.7
AV MEAN GRADIENT: 4 MMHG
AV PEAK GRADIENT: 7 MMHG
AV VALVE AREA: 2.7 CM2
BSA FOR ECHO PROCEDURE: 2.11 M2
CV BLOOD PRESSURE: ABNORMAL MMHG
CV ECHO HEIGHT: 67 IN
CV ECHO WEIGHT: 208 LBS
DOP CALC AO PEAK VEL: 132 CM/S
DOP CALC AO VTI: 29.3 CM
DOP CALC LVOT AREA: 3.8 CM2
DOP CALC LVOT DIAMETER: 2.2 CM
DOP CALC LVOT STROKE VOLUME: 80.2 CM3
DOP CALCLVOT PEAK VEL VTI: 21.1 CM
EJECTION FRACTION: 40 % (ref 55–75)
FRACTIONAL SHORTENING: 22 % (ref 28–44)
INTERVENTRICULAR SEPTUM IN END DIASTOLE: 1 CM (ref 0.6–1)
IVS/PW RATIO: 1
LA AREA 1: 28.2 CM2
LA AREA 2: 33 CM2
LEFT ATRIUM LENGTH: 5.99 CM
LEFT ATRIUM SIZE: 4.2 CM
LEFT ATRIUM VOLUME INDEX: 62.6 ML/M2
LEFT ATRIUM VOLUME: 132.1 ML
LEFT VENTRICLE CARDIAC INDEX: 2.8 L/MIN/M2
LEFT VENTRICLE CARDIAC OUTPUT: 5.9 L/MIN
LEFT VENTRICLE DIASTOLIC VOLUME INDEX: 91 CM3/M2 (ref 34–74)
LEFT VENTRICLE DIASTOLIC VOLUME: 192 CM3 (ref 62–150)
LEFT VENTRICLE HEART RATE: 74 BPM
LEFT VENTRICLE MASS INDEX: 113.7 G/M2
LEFT VENTRICLE SYSTOLIC VOLUME INDEX: 55 CM3/M2 (ref 11–31)
LEFT VENTRICLE SYSTOLIC VOLUME: 116 CM3 (ref 21–61)
LEFT VENTRICULAR INTERNAL DIMENSION IN DIASTOLE: 5.9 CM (ref 4.2–5.8)
LEFT VENTRICULAR INTERNAL DIMENSION IN SYSTOLE: 4.6 CM (ref 2.5–4)
LEFT VENTRICULAR MASS: 239.9 G
LEFT VENTRICULAR OUTFLOW TRACT MEAN GRADIENT: 2 MMHG
LEFT VENTRICULAR OUTFLOW TRACT MEAN VELOCITY: 71.7 CM/S
LEFT VENTRICULAR POSTERIOR WALL IN END DIASTOLE: 1 CM (ref 0.6–1)
LV STROKE VOLUME INDEX: 38 ML/M2
MITRAL REGURGITANT VELOCITY TIME INTEGRAL: 143 CM
MITRAL VALVE E/A RATIO: 2.3
MR FLOW: 14 CM3
MR MEAN GRADIENT: 66 MMHG
MR MEAN VELOCITY: 390 CM/S
MR PEAK GRADIENT: 92.5 MMHG
MR PISA EROA: 0.1 CM2
MR PISA RADIUS: 0.5 CM
MR PISA VN NYQUIST: 28.8 CM/S
MV AVERAGE E/E' RATIO: 15.5 CM/S
MV DECELERATION TIME: 190 MS
MV E'TISSUE VEL-LAT: 8.87 CM/S
MV E'TISSUE VEL-MED: 7.12 CM/S
MV LATERAL E/E' RATIO: 14
MV MEDIAL E/E' RATIO: 17.4
MV PEAK A VELOCITY: 53.8 CM/S
MV PEAK E VELOCITY: 124 CM/S
MV REGURGITANT VOLUME: 13.4 CC
NUC REST DIASTOLIC VOLUME INDEX: 3328 LBS
NUC REST SYSTOLIC VOLUME INDEX: 67 IN
PISA MR PEAK VEL: 481 CM/S
TRICUSPID REGURGITATION PEAK PRESSURE GRADIENT: 31.6 MMHG
TRICUSPID VALVE ANULAR PLANE SYSTOLIC EXCURSION: 1.9 CM
TRICUSPID VALVE PEAK REGURGITANT VELOCITY: 281 CM/S

## 2019-04-12 ENCOUNTER — OFFICE VISIT - HEALTHEAST (OUTPATIENT)
Dept: CARDIOLOGY | Facility: CLINIC | Age: 54
End: 2019-04-12

## 2019-04-12 DIAGNOSIS — I21.02 ST ELEVATION MYOCARDIAL INFARCTION INVOLVING LEFT ANTERIOR DESCENDING (LAD) CORONARY ARTERY (H): ICD-10-CM

## 2019-04-12 DIAGNOSIS — I25.118 CORONARY ARTERY DISEASE OF NATIVE HEART WITH STABLE ANGINA PECTORIS, UNSPECIFIED VESSEL OR LESION TYPE (H): ICD-10-CM

## 2019-04-12 DIAGNOSIS — Z95.810 ICD (IMPLANTABLE CARDIOVERTER-DEFIBRILLATOR), SINGLE, IN SITU: ICD-10-CM

## 2019-04-12 DIAGNOSIS — E78.00 PURE HYPERCHOLESTEROLEMIA: ICD-10-CM

## 2019-04-12 DIAGNOSIS — I24.9 ACS (ACUTE CORONARY SYNDROME) (H): ICD-10-CM

## 2019-04-12 DIAGNOSIS — I50.22 CHRONIC SYSTOLIC HEART FAILURE (H): ICD-10-CM

## 2019-04-12 DIAGNOSIS — I25.5 ISCHEMIC CARDIOMYOPATHY: ICD-10-CM

## 2019-04-12 ASSESSMENT — MIFFLIN-ST. JEOR: SCORE: 1736.18

## 2019-06-03 ENCOUNTER — AMBULATORY - HEALTHEAST (OUTPATIENT)
Dept: CARDIOLOGY | Facility: CLINIC | Age: 54
End: 2019-06-03

## 2019-06-03 DIAGNOSIS — Z95.810 ICD (IMPLANTABLE CARDIOVERTER-DEFIBRILLATOR), SINGLE, IN SITU: ICD-10-CM

## 2019-06-24 ENCOUNTER — COMMUNICATION - HEALTHEAST (OUTPATIENT)
Dept: CARDIOLOGY | Facility: CLINIC | Age: 54
End: 2019-06-24

## 2019-06-24 DIAGNOSIS — I50.22 CHRONIC SYSTOLIC HEART FAILURE (H): ICD-10-CM

## 2019-06-24 DIAGNOSIS — I25.5 ISCHEMIC CARDIOMYOPATHY: ICD-10-CM

## 2019-06-27 ENCOUNTER — COMMUNICATION - HEALTHEAST (OUTPATIENT)
Dept: CARDIOLOGY | Facility: CLINIC | Age: 54
End: 2019-06-27

## 2019-06-27 DIAGNOSIS — I25.10 CORONARY ARTERY DISEASE DUE TO LIPID RICH PLAQUE: ICD-10-CM

## 2019-06-27 DIAGNOSIS — E78.5 DYSLIPIDEMIA: ICD-10-CM

## 2019-06-27 DIAGNOSIS — I25.83 CORONARY ARTERY DISEASE DUE TO LIPID RICH PLAQUE: ICD-10-CM

## 2019-09-04 ENCOUNTER — AMBULATORY - HEALTHEAST (OUTPATIENT)
Dept: CARDIOLOGY | Facility: CLINIC | Age: 54
End: 2019-09-04

## 2019-09-04 DIAGNOSIS — Z95.810 ICD (IMPLANTABLE CARDIOVERTER-DEFIBRILLATOR), SINGLE, IN SITU: ICD-10-CM

## 2020-03-15 ENCOUNTER — COMMUNICATION - HEALTHEAST (OUTPATIENT)
Dept: CARDIOLOGY | Facility: CLINIC | Age: 55
End: 2020-03-15

## 2020-03-15 DIAGNOSIS — I25.10 CORONARY ARTERY DISEASE DUE TO LIPID RICH PLAQUE: ICD-10-CM

## 2020-03-15 DIAGNOSIS — I25.83 CORONARY ARTERY DISEASE DUE TO LIPID RICH PLAQUE: ICD-10-CM

## 2020-03-15 DIAGNOSIS — E78.5 DYSLIPIDEMIA: ICD-10-CM

## 2020-03-15 DIAGNOSIS — I50.22 CHRONIC SYSTOLIC HEART FAILURE (H): ICD-10-CM

## 2020-04-24 ENCOUNTER — OFFICE VISIT - HEALTHEAST (OUTPATIENT)
Dept: CARDIOLOGY | Facility: CLINIC | Age: 55
End: 2020-04-24

## 2020-04-24 DIAGNOSIS — I50.22 CHRONIC SYSTOLIC HEART FAILURE (H): ICD-10-CM

## 2020-04-24 DIAGNOSIS — I21.02 ST ELEVATION MYOCARDIAL INFARCTION INVOLVING LEFT ANTERIOR DESCENDING (LAD) CORONARY ARTERY (H): ICD-10-CM

## 2020-04-24 DIAGNOSIS — E78.5 DYSLIPIDEMIA: ICD-10-CM

## 2020-04-24 DIAGNOSIS — I25.118 CORONARY ARTERY DISEASE OF NATIVE HEART WITH STABLE ANGINA PECTORIS, UNSPECIFIED VESSEL OR LESION TYPE (H): ICD-10-CM

## 2020-04-24 DIAGNOSIS — E78.00 PURE HYPERCHOLESTEROLEMIA: ICD-10-CM

## 2020-04-24 DIAGNOSIS — Z95.810 ICD (IMPLANTABLE CARDIOVERTER-DEFIBRILLATOR), SINGLE, IN SITU: ICD-10-CM

## 2020-04-24 DIAGNOSIS — I25.10 CORONARY ARTERY DISEASE DUE TO LIPID RICH PLAQUE: ICD-10-CM

## 2020-04-24 DIAGNOSIS — I25.83 CORONARY ARTERY DISEASE DUE TO LIPID RICH PLAQUE: ICD-10-CM

## 2020-04-24 DIAGNOSIS — I25.5 ISCHEMIC CARDIOMYOPATHY: ICD-10-CM

## 2020-09-21 ENCOUNTER — AMBULATORY - HEALTHEAST (OUTPATIENT)
Dept: CARDIOLOGY | Facility: CLINIC | Age: 55
End: 2020-09-21

## 2020-09-21 DIAGNOSIS — I25.5 ISCHEMIC CARDIOMYOPATHY: ICD-10-CM

## 2020-09-21 DIAGNOSIS — Z95.810 ICD (IMPLANTABLE CARDIOVERTER-DEFIBRILLATOR), SINGLE, IN SITU: ICD-10-CM

## 2020-10-15 ENCOUNTER — VIRTUAL VISIT (OUTPATIENT)
Dept: FAMILY MEDICINE | Facility: OTHER | Age: 55
End: 2020-10-15

## 2020-10-15 NOTE — PROGRESS NOTES
"Date: 10/15/2020 11:35:50  Clinician: Iris Harley  Clinician NPI: [id number]  Patient: Daniele Becker  Patient : 1965  [facility] [place] [address] [place] [address]  Patient Phone: [phone]  Visit Protocol: URI  Patient Summary:  Daniele is a 55 year old ( : 1965 ) male who initiated a [facility] Visit for COVID-19 (Coronavirus) evaluation and screening. When asked the question \"Please sign me up to receive news, health information and promotions. \", Daniele responded \"No\".    Daniele states his symptoms started 1-2 days ago.   His symptoms consist of a headache, a cough, nasal congestion, rhinitis, nausea, myalgia, chills, and malaise. He is experiencing mild difficulty breathing with activities but can speak normally in full sentences.   Symptom details     Nasal secretions: The color of his mucus is clear.    Cough: Daniele coughs a few times an hour and his cough is not more bothersome at night. Phlegm comes into his throat when he coughs. He believes his cough is caused by post-nasal drip. The color of the phlegm is white.     Headache: He states the headache is mild (1-3 on a 10 point pain scale).      Daniele denies having ear pain, wheezing, fever, enlarged lymph nodes, anosmia, vomiting, facial pain or pressure, sore throat, teeth pain, ageusia, and diarrhea. He also denies taking antibiotic medication in the past month and having recent facial or sinus surgery in the past 60 days.   Precipitating events  He has not recently been exposed to someone with influenza. Daniele has been in close contact with the following high risk individuals: immunocompromised people.   Pertinent COVID-19 (Coronavirus) information  In the past 14 days, Daniele has not worked in a congregate living setting.   He does not work or volunteer as healthcare worker or a  and does not work or volunteer in a healthcare facility.   Daniele also has not lived in a congregate living " setting in the past 14 days. He does not live with a healthcare worker.   Daniele has had a close contact with a laboratory-confirmed COVID-19 patient within 14 days of symptom onset. 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 Has not been diagnosed with lab-confirmed COVID-19 test   Pertinent medical history  Daniele needs a return to work/school note.   Weight: 205 lbs   Daniele does not smoke or use smokeless tobacco.   Additional information as reported by the patient (free text): I had a heart attack a few years ago and have had a pace maker/defibrillator sewn in my chest, and my wife that I live with is a three time cancer survivor   Weight: 205 lbs    MEDICATIONS: carvedilol oral, lisinopril oral, atorvastatin oral, Aspirin Low Dose oral, ALLERGIES: NKDA  Clinician Response:  Dear Daniele,   Your symptoms show that you may have coronavirus (COVID-19). This illness can cause fever, cough and trouble breathing. Many people get a mild case and get better on their own. Some people can get very sick.  What should I do?  We would like to test you for this virus.   1. Please call [phone] to schedule your visit. Explain that you were referred by [facility] to have a COVID-19 test. [facility].  The following will serve as your written order for this COVID Test, ordered by me, for the indication of suspected COVID [Z20.828]: The test will be ordered in [employer], our electronic health record, after you are scheduled. It will show as ordered and authorized by Easton Krishnan MD.  Order: COVID-19 (Coronavirus) PCR for SYMPTOMATIC testing from [facility].      2. When it's time for your COVID test:   "Stay at least 6 feet away from others. (If someone will drive you to your test, stay in the backseat, as far away from the  as you can.)   Cover your mouth and nose with a mask, tissue or washcloth.  Go straight to the testing site. Don't make any stops on the way there or back.      3.Starting now: Stay home and away from others (self-isolate) until:   You've had no fever---and no medicine that reduces fever---for one full day (24 hours). And...   Your other symptoms have gotten better. For example, your cough or breathing has improved. And...   At least 10 days have passed since your symptoms started.       During this time, don't leave the house except for testing or medical care.   Stay in your own room, even for meals. Use your own bathroom if you can.   Stay away from others in your home. No hugging, kissing or shaking hands. No visitors.  Don't go to work, school or anywhere else.    Clean \"high touch\" surfaces often (doorknobs, counters, handles, etc.). Use a household cleaning spray or wipes. You'll find a full list of  on the EPA website: www.epa.gov/pesticide-registration/list-n-disinfectants-use-against-sars-cov-2.   Cover your mouth and nose with a mask, tissue or washcloth to avoid spreading germs.  Wash your hands and face often. Use soap and water.  Caregivers in these groups are at risk for severe illness due to COVID-19:  o People 65 years and older  o People who live in a nursing home or long-term care facility  o People with chronic disease (lung, heart, cancer, diabetes, kidney, liver, immunologic)  o People who have a weakened immune system, including those who:   Are in cancer treatment  Take medicine that weakens the immune system, such as corticosteroids  Had a bone marrow or organ transplant  Have an immune deficiency  Have poorly controlled HIV or AIDS  Are obese (body mass index of 40 or higher)  Smoke regularly   o Caregivers should wear gloves while washing dishes, " handling laundry and cleaning bedrooms and bathrooms.  o Use caution when washing and drying laundry: Don't shake dirty laundry, and use the warmest water setting that you can.  o For more tips, go to www.cdc.gov/coronavirus/2019-ncov/downloads/10Things.pdf.    4.Sign up for [employer]. We know it's scary to hear that you might have COVID-19. We want to track your symptoms to make sure you're okay over the next 2 weeks. Please look for an email from [employer]---this is a free, online program that we'll use to keep in touch. To sign up, follow the link in the email. Learn more at [email or web address]  How can I take care of myself?   Get lots of rest. Drink extra fluids (unless a doctor has told you not to).   Take Tylenol (acetaminophen) for fever or pain. If you have liver or kidney problems, ask your family doctor if it's okay to take Tylenol.   Adults can take either:    650 mg (two 325 mg pills) every 4 to 6 hours, or...   1,000 mg (two 500 mg pills) every 8 hours as needed.    Note: Don't take more than 3,000 mg in one day. Acetaminophen is found in many medicines (both prescribed and over-the-counter medicines). Read all labels to be sure you don't take too much.   For children, check the Tylenol bottle for the right dose. The dose is based on the child's age or weight.    If you have other health problems (like cancer, heart failure, an organ transplant or severe kidney disease): Call your specialty clinic if you don't feel better in the next 2 days.       Know when to call 911. Emergency warning signs include:    Trouble breathing or shortness of breath Pain or pressure in the chest that doesn't go away Feeling confused like you haven't felt before, or not being able to wake up Bluish-colored lips or face.  Where can I get more information?    [facility] [place] -- About COVID-19: [email or web address]   CDC -- What to Do If You're Sick:  www.cdc.gov/coronavirus/2019-ncov/about/steps-when-sick.html   CDC -- Ending Home Isolation: www.cdc.gov/coronavirus/2019-ncov/hcp/disposition-in-home-patients.html   CDC -- Caring for Someone: www.cdc.gov/coronavirus/2019-ncov/if-you-are-sick/care-for-someone.html   [facility] -- Interim Guidance for Hospital Discharge to Home: www.health.[facility].[place]./diseases/coronavirus/hcp/hospdischarge.pdf   [facility] clinical trials (COVID-19 research studies): clinicalaffairs.[facility]-clinical-trials    Below are the COVID-19 hotlines at the [place] Department of Health ([facility]). Interpreters are available.    For health questions: Call 253-980-1276 or 1-651.858.1620 (7 a.m. to 7 p.m.) For questions about schools and childcare: Call 925-264-3303 or 1-742.951.9049 (7 a.m. to 7 p.m.)    COVID-19 (Coronavirus) General Information  Because there is currently no vaccine to prevent infection, the best way to protect yourself is to avoid being exposed to this virus. Common symptoms of COVID-19 include but are not limited to fever, cough, and shortness of breath. These symptoms appear 2-14 days after you are exposed to the virus that causes COVID-19. Click here for more information from the CDC on how to protect yourself.  If you are sick with COVID-19 or suspect you are infected with the virus that causes COVID-19, follow the steps here from the CDC to help prevent the disease from spreading to people in your home and community.  Click here for general information from the CDC on testing.  If you develop any of these emergency warning signs for COVID-19, get medical attention immediately:     Trouble breathing    Persistent pain or pressure in the chest    New confusion or inability to arouse    Bluish lips or face      Call your doctor or clinic before going in. Call [phone] if you have a medical emergency and notify the  you have or think you may have COVID-19.  For more detailed and up to date information on COVID-19  (Coronavirus), please visit the CDC website.   Diagnosis: Contact with and (suspected) exposure to other viral communicable diseases  Diagnosis ICD: Z20.828

## 2021-04-05 ENCOUNTER — OFFICE VISIT - HEALTHEAST (OUTPATIENT)
Dept: CARDIOLOGY | Facility: CLINIC | Age: 56
End: 2021-04-05

## 2021-04-05 DIAGNOSIS — I25.5 ISCHEMIC CARDIOMYOPATHY: ICD-10-CM

## 2021-04-05 DIAGNOSIS — I10 BENIGN ESSENTIAL HYPERTENSION: ICD-10-CM

## 2021-04-05 DIAGNOSIS — E78.00 PURE HYPERCHOLESTEROLEMIA: ICD-10-CM

## 2021-04-05 DIAGNOSIS — I50.22 CHRONIC SYSTOLIC HEART FAILURE (H): ICD-10-CM

## 2021-04-05 DIAGNOSIS — I25.10 CORONARY ARTERY DISEASE DUE TO LIPID RICH PLAQUE: ICD-10-CM

## 2021-04-05 DIAGNOSIS — E78.5 DYSLIPIDEMIA: ICD-10-CM

## 2021-04-05 DIAGNOSIS — I25.83 CORONARY ARTERY DISEASE DUE TO LIPID RICH PLAQUE: ICD-10-CM

## 2021-04-05 RX ORDER — LISINOPRIL 2.5 MG/1
2.5 TABLET ORAL DAILY
Qty: 90 TABLET | Refills: 4 | Status: SHIPPED | OUTPATIENT
Start: 2021-04-05 | End: 2022-04-19

## 2021-04-05 RX ORDER — ATORVASTATIN CALCIUM 80 MG/1
80 TABLET, FILM COATED ORAL DAILY
Qty: 90 TABLET | Refills: 4 | Status: SHIPPED | OUTPATIENT
Start: 2021-04-05 | End: 2022-05-16

## 2021-04-05 RX ORDER — LISINOPRIL 5 MG/1
5 TABLET ORAL DAILY
Qty: 90 TABLET | Refills: 4 | Status: SHIPPED | OUTPATIENT
Start: 2021-04-05 | End: 2022-04-19

## 2021-04-05 RX ORDER — CARVEDILOL 25 MG/1
25 TABLET ORAL 2 TIMES DAILY WITH MEALS
Qty: 180 TABLET | Refills: 4 | Status: SHIPPED | OUTPATIENT
Start: 2021-04-05 | End: 2022-05-16

## 2021-04-05 ASSESSMENT — MIFFLIN-ST. JEOR: SCORE: 1786.08

## 2021-05-26 ENCOUNTER — RECORDS - HEALTHEAST (OUTPATIENT)
Dept: ADMINISTRATIVE | Facility: CLINIC | Age: 56
End: 2021-05-26

## 2021-05-27 ENCOUNTER — RECORDS - HEALTHEAST (OUTPATIENT)
Dept: ADMINISTRATIVE | Facility: CLINIC | Age: 56
End: 2021-05-27

## 2021-05-27 NOTE — PROGRESS NOTES
[facility] Follow-up Note    Assessment & Plan        1. ST elevation myocardial infarction involving left anterior descending (LAD) coronary artery (H) this was of the left anterior descending due to total occlusion in April 2018.  MRI showed no viability and stable.  Work on prevention without any significant symptoms.   2. Ischemic cardiomyopathy - ejection fraction on MRI 21%.  Received ICD July 6, 2018.  On appropriate lisinopril and carvedilol at maximum dose.  Enrolled in the Myocardia study.  The systolic ejection times remained  280 ms, 281 ms, 289 ms on 3 subsequent studies with the last 290 ms on 3 studies.  Will increase lisinopril up to 10 mg a day since his blood pressure is 120s at home.   3. ACS (acute coronary syndrome) (H) -as above stable.   4. Coronary artery disease of native heart with stable angina pectoris, unspecified vessel or lesion type (H) -angiography April 2018 showed normal left main, left anterior descending with a proximal total occlusion, normal circumflex and normal right coronary artery.  No viability thus no  procedure.   5. Chronic systolic heart failure (H) -no signs or symptoms currently.   6. Pure hypercholesterolemia - on maximum dose of atorvastatin 80 mg.  If recurrent event consider adding Zetia or PCSK9 inhibitor.   7. ICD (implantable cardioverter-defibrillator), single, in situ -[employer] device with less than 1% ventricular pacing and no obvious arrhythmias.     Plan  1.  Discontinue ticagrelor when it runs out, it has been a year since his event.  2.  Increase lisinopril up to 10 mg a day and get prescription for same if he tolerates it.  3.  Follow-up with me in 6 months or sooner if needed.  4.  Check fasting lipids in 6 months.    Subjective  CC: 53-year-old white gentleman being seen in post hospital discharge follow-up after being hospitalized for the Myocardia study.  He is back at work, full-time, denies any chest  "discomfort, palpitations, shortness of breath, PND, orthopnea or peripheral edema.    Medications  Current Outpatient Medications   Medication Sig     acetaminophen (TYLENOL) 650 MG CR tablet Take 650 mg by mouth every 8 (eight) hours as needed for pain.            aspirin 81 mg chewable tablet Chew 1 tablet (81 mg total) daily.     atorvastatin (LIPITOR) 80 MG tablet Take 1 tablet (80 mg total) by mouth daily.     carvedilol (COREG) 25 MG tablet Take 1 tablet (25 mg total) by mouth 2 (two) times a day with meals.     furosemide (LASIX) 20 MG tablet Take 0.5 tablets (10 mg total) by mouth as needed (For fluid retention, sob).     lisinopril (PRINIVIL,ZESTRIL) 2.5 MG tablet Take 1 tablet (2.5 mg total) by mouth daily. Take 2.5 mg + 5 mg of Lisinopril (total 7.5 mg) daily     lisinopril (PRINIVIL,ZESTRIL) 5 MG tablet Take 1 tablet (5 mg total) by mouth daily Take 5 mg +2.5 mg (7.5 mg daily).     ticagrelor (BRILINTA) 90 mg Tab Take 1 tablet (90 mg total) by mouth 2 (two) times a day.       Objective  /64 (Patient Site: Left Arm, Patient Position: Sitting, Cuff Size: Adult Large)   Pulse 82   Resp 16   Ht 5' 7\" (1.702 m)   Wt 210 lb (95.3 kg)   BMI 32.89 kg/m      General Appearance:    Alert, cooperative, no distress, appears stated age   Head:    Normocephalic, without obvious abnormality, atraumatic   Throat:   Lips, mucosa, and tongue normal; teeth and gums normal   Neck:   Supple, symmetrical, trachea midline, no adenopathy;        thyroid:  No enlargement/tenderness/nodules; no carotid    bruit or JVD   Back:     Symmetric, no curvature, ROM normal, no CVA tenderness   Lungs:     Clear to auscultation bilaterally, respirations unlabored   Chest wall:    No tenderness, left-sided ICD   Heart:    Regular rate and rhythm, S1 and S2 normal, no murmur, rub   or gallop   Abdomen:     Soft, non-tender, bowel sounds active all four quadrants,     no masses, no organomegaly   Extremities:   Normal, atraumatic, " no cyanosis or edema   Pulses:   2+ and symmetric all extremities   Skin:   Skin color, texture, turgor normal, no rashes or lesions     Results    Lab Results personally reviewed   Lab Results   Component Value Date    CHOL 157 04/06/2018     Lab Results   Component Value Date    HDL 27 (L) 04/06/2018     Lab Results   Component Value Date    LDLCALC 113 04/06/2018     Lab Results   Component Value Date    TRIG 86 04/06/2018     Lab Results   Component Value Date    WBC 7.6 02/05/2019    HGB 10.5 (L) 02/05/2019    HCT 33.4 (L) 02/05/2019     02/05/2019     Lab Results   Component Value Date    CREATININE 0.92 01/29/2019    BUN 18 01/29/2019     01/29/2019    K 4.7 01/29/2019    CO2 26 01/29/2019     Review of Systems:   General: WNL  Eyes: WNL  Ears/Nose/Throat: WNL  Lungs: WNL  Heart: WNL  Stomach: WNL  Bladder: WNL  Muscle/Joints: WNL  Skin: WNL  Nervous System: WNL  Mental Health: WNL     Blood: WNL

## 2021-05-27 NOTE — PATIENT INSTRUCTIONS - HE
Mr Daniele Becker,  I enjoyed visiting with you again today.  I am glad to hear you are doing well.  Per our conversation when the BRILANTA runs out stop and try LISINOPRIL 10 mg a day and if no lightheadedness call me at [phone].  I will plan on seeing you 6 month or sooner if needed.  Hernan Israel

## 2021-05-29 ENCOUNTER — RECORDS - HEALTHEAST (OUTPATIENT)
Dept: ADMINISTRATIVE | Facility: CLINIC | Age: 56
End: 2021-05-29

## 2021-06-01 VITALS — HEIGHT: 67 IN | BODY MASS INDEX: 30.29 KG/M2 | WEIGHT: 193 LBS

## 2021-06-01 VITALS — BODY MASS INDEX: 30.71 KG/M2 | WEIGHT: 202.6 LBS | HEIGHT: 68 IN

## 2021-06-01 VITALS — HEIGHT: 67 IN | BODY MASS INDEX: 31.39 KG/M2 | WEIGHT: 200 LBS

## 2021-06-01 VITALS — WEIGHT: 192.6 LBS | BODY MASS INDEX: 30.23 KG/M2 | HEIGHT: 67 IN

## 2021-06-01 VITALS — BODY MASS INDEX: 30.46 KG/M2 | HEIGHT: 68 IN | WEIGHT: 201 LBS

## 2021-06-01 VITALS — WEIGHT: 195 LBS | BODY MASS INDEX: 30.61 KG/M2 | HEIGHT: 67 IN

## 2021-06-01 VITALS — HEIGHT: 67 IN | BODY MASS INDEX: 30.92 KG/M2 | WEIGHT: 197 LBS

## 2021-06-01 VITALS — WEIGHT: 203.5 LBS | HEIGHT: 69 IN | BODY MASS INDEX: 30.14 KG/M2

## 2021-06-01 VITALS — BODY MASS INDEX: 29.79 KG/M2 | WEIGHT: 190.2 LBS

## 2021-06-01 VITALS — BODY MASS INDEX: 31.55 KG/M2 | WEIGHT: 201 LBS | HEIGHT: 67 IN

## 2021-06-01 VITALS — HEIGHT: 67 IN | BODY MASS INDEX: 31.17 KG/M2 | WEIGHT: 198.6 LBS

## 2021-06-01 VITALS — WEIGHT: 215.6 LBS

## 2021-06-01 VITALS — WEIGHT: 193 LBS | HEIGHT: 67 IN | BODY MASS INDEX: 30.29 KG/M2

## 2021-06-02 VITALS — WEIGHT: 207 LBS | HEIGHT: 67 IN | BODY MASS INDEX: 32.49 KG/M2

## 2021-06-02 VITALS — WEIGHT: 210 LBS | BODY MASS INDEX: 32.96 KG/M2 | HEIGHT: 67 IN

## 2021-06-02 VITALS — BODY MASS INDEX: 31.52 KG/M2 | HEIGHT: 68 IN | WEIGHT: 208 LBS

## 2021-06-02 VITALS — BODY MASS INDEX: 32.65 KG/M2 | HEIGHT: 67 IN | WEIGHT: 208 LBS

## 2021-06-02 VITALS — BODY MASS INDEX: 32.65 KG/M2 | WEIGHT: 208 LBS | HEIGHT: 67 IN

## 2021-06-02 VITALS — HEIGHT: 67 IN | WEIGHT: 209 LBS | BODY MASS INDEX: 32.8 KG/M2

## 2021-06-02 VITALS — BODY MASS INDEX: 32.47 KG/M2 | WEIGHT: 207.3 LBS

## 2021-06-02 VITALS — WEIGHT: 205 LBS | HEIGHT: 68 IN | BODY MASS INDEX: 31.07 KG/M2

## 2021-06-02 VITALS — WEIGHT: 207.3 LBS | BODY MASS INDEX: 32.47 KG/M2

## 2021-06-04 VITALS
SYSTOLIC BLOOD PRESSURE: 138 MMHG | DIASTOLIC BLOOD PRESSURE: 95 MMHG | BODY MASS INDEX: 31.95 KG/M2 | HEART RATE: 62 BPM | WEIGHT: 204 LBS

## 2021-06-05 VITALS
HEART RATE: 64 BPM | DIASTOLIC BLOOD PRESSURE: 80 MMHG | BODY MASS INDEX: 34.69 KG/M2 | HEIGHT: 67 IN | SYSTOLIC BLOOD PRESSURE: 122 MMHG | RESPIRATION RATE: 16 BRPM | WEIGHT: 221 LBS

## 2021-06-07 NOTE — PATIENT INSTRUCTIONS - HE
Mr. Becker,  It was a pleasure speaking with you today.  I am glad you are doing so well.  I sent all prescriptions for the 2 forms of lisinopril, the carvedilol, as well as the atorvastatin, 90-day supply with 4 refills.  I will connect with our device nurses and asked them to make these checks only yearly.  I will plan on seeing you in a year or sooner if issues should pop up.  Stay safe.  Hernan Israel

## 2021-06-16 PROBLEM — Z95.810 ICD (IMPLANTABLE CARDIOVERTER-DEFIBRILLATOR), SINGLE, IN SITU: Status: ACTIVE | Noted: 2018-07-17

## 2021-06-16 PROBLEM — I25.118 CORONARY ARTERY DISEASE OF NATIVE HEART WITH STABLE ANGINA PECTORIS, UNSPECIFIED VESSEL OR LESION TYPE (H): Status: ACTIVE | Noted: 2018-04-06

## 2021-06-16 PROBLEM — E78.00 PURE HYPERCHOLESTEROLEMIA: Status: ACTIVE | Noted: 2018-06-25

## 2021-06-16 PROBLEM — I50.22 CHRONIC SYSTOLIC HEART FAILURE (H): Status: ACTIVE | Noted: 2018-05-23

## 2021-06-16 PROBLEM — I25.5 ISCHEMIC CARDIOMYOPATHY: Status: ACTIVE | Noted: 2018-04-06

## 2021-06-16 PROBLEM — I10 BENIGN ESSENTIAL HYPERTENSION: Status: ACTIVE | Noted: 2021-04-05

## 2021-06-16 NOTE — PATIENT INSTRUCTIONS - HE
Mr Daniele Becker,  I enjoyed visiting with you again today.  I am glad to hear you are doing well.  Per our conversation when you get insurance let us know and we will get echo.  I will plan on seeing you 1 year or sooner if needed.  Hernan Israel

## 2021-06-16 NOTE — TELEPHONE ENCOUNTER
Telephone Encounter by Ani Turner RN at 1/8/2019  3:07 PM     Author: Ani Turner RN Service: -- Author Type: Registered Nurse    Filed: 1/8/2019  3:16 PM Encounter Date: 1/8/2019 Status: Signed    : Ani Turner RN (Registered Nurse)        [id number]  Called Mr. Becker and discussed the results of the screening tests with him and that he meet Inclusion/Exclusion criteria. We reviewed the study restrictions per the protocol prior to admission, all questions were answered to his satisfactoriness  and Mr. Becker agrees to participate in the MyoKardia study with admission occurring at 0530 on Monday, January 14th.     Ani Turner RN

## 2021-06-17 NOTE — PROGRESS NOTES
Assessment:     1. Hospital discharge follow-up     2. Need for vaccination  Tdap vaccine,  6yo or older,  IM   3. STEMI (ST elevation myocardial infarction)     4. Acute systolic congestive heart failure     5. Nosebleed       Regarding his nosebleed, I did discuss patient that he is on antiplatelet  agent Brilinta.  Advised not to blow the nose hard and avoid picking.  May use saline flushes.  Reviewed all his medications.     Plan:      Follow-up as before scheduled with cardiology.   Reviewed signs and symptoms of congestive heart failure flareup.  Avoid high salt diet.  Monitor weight.  Monitor blood pressure.    Subjective:      Daniele Becker is a  male who presents for evaluation of   Chief Complaint   Patient presents with     Hospital Visit Follow Up     Follow up heart attack and bruise on forearm     Epistaxis     Gets bloody noses quite a bit.  Had a bloody nose for 2 hours on sunday.      Patient is here for follow-up from hospital visit. I had seen the patient last week in clinic and noting EKG abnormality with ST elevations, chest x-ray showing pulmonary congestion sent him to the hospital.  He underwent a cath without successful stent placement.  Currently he is on medical management.    Hospital admission, discharge and consultations and studies and procedures reviewed.  These are also  shared with the patient.    Since his discharge he has been feeling considerably better.  He is able to walk 800- 1000 feet without getting short of breath  He denies any chest pain or cough.  No fevers.  He does describe an episode of nosebleed after blowing his nose hard.  He does have a history of nosebleeds in the past.    He and his wife are mainly concerned about lack of insurance and the barrier it is causing in his health care management.  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  Although, he does inform me that he does not make enough money.    The  following portions of the patient's history were reviewed and updated as appropriate: allergies, current medications, past family history, past medical history, past social history, past surgical history and problem list.  Allergies   Allergen Reactions     No Known Drug Allergies        Current Outpatient Prescriptions on File Prior to Visit   Medication Sig Dispense Refill     albuterol (PROAIR HFA;PROVENTIL HFA;VENTOLIN HFA) 90 mcg/actuation inhaler Inhale 2 puffs every 6 (six) hours as needed for wheezing.       aspirin 81 mg chewable tablet Chew 1 tablet (81 mg total) daily.  0     atorvastatin (LIPITOR) 80 MG tablet Take 1 tablet (80 mg total) by mouth daily. 30 tablet 0     carvedilol (COREG) 3.125 MG tablet Take 1 tablet (3.125 mg total) by mouth 2 (two) times a day. 60 tablet 0     furosemide (LASIX) 20 MG tablet Take 1 tablet (20 mg total) by mouth 2 (two) times a day at 9am and 6pm. 60 tablet 0     ticagrelor (BRILINTA) 90 mg Tab Take 1 tablet (90 mg total) by mouth 2 (two) times a day. 30 tablet 0     No current facility-administered medications on file prior to visit.        Patient Active Problem List   Diagnosis     Dyspnea     ACS (acute coronary syndrome)     STEMI (ST elevation myocardial infarction)     Dyslipidemia     Tobacco dependence in remission     Ischemic cardiomyopathy     Coronary artery disease due to lipid rich plaque     Acute systolic congestive heart failure     ST elevation myocardial infarction involving left anterior descending (LAD) coronary artery       Past Medical History:   Diagnosis Date     Coronary artery disease      Dyslipidemia      Hypertension      Tobacco dependence in remission 4/5/2018       Past Surgical History:   Procedure Laterality Date     CV CORONARY ANGIOGRAM N/A 4/5/2018    Procedure: Coronary Angiogram;  Surgeon: Wilber Darby MD;  Location: [facility] Cath Lab;  Service:      CV LEFT HEART CATHETERIZATION WO LEFT VETRICULOGRAM Left 4/5/2018     "Procedure: Left Heart Catheterization Without Left Ventriculogram;  Surgeon: Wilber Darby MD;  Location: [facility] Cath Lab;  Service:        Family History   Problem Relation Age of Onset     Huber Father      ' at 76 from rupture of vessel'       Social History     Social History     Marital status:      Spouse name: N/A     Number of children: N/A     Years of education: N/A     Social History Main Topics     Smoking status: Former Smoker     Quit date: 2016     Smokeless tobacco: Current User      Comment: Quit 2 years ago, 5 still smokes.     Alcohol use Yes      Comment: last drink 2 weeks ago     Drug use: No     Sexual activity: Not Asked     Other Topics Concern     None     Social History Narrative    Works at [employer].  and lives with wife.     Has grandchildren and wife babysits them.        Eun Castro MD  2018        No medical insurance yet and this has been a barrier in getting adequate cares.  Unable to get a LifeVest as they have to pay out of pocket.    Followed by  at [facility].        Eun Castro MD  2018                       Review of Systems  Constitutional: negative  Respiratory: negative  Cardiovascular: negative  Gastrointestinal: negative       Objective:     BP 90/60  Pulse 82  Temp 98.2  F (36.8  C) (Oral)   Resp 18  Ht 5' 6.75\" (1.695 m)  Wt 201 lb (91.2 kg)  SpO2 97%  BMI 31.72 kg/m2    General Appearance:  alert, oriented and no distress  HEENT Exam: Oropharynx clear without lesion or exudate  Neck:  supple, no adenopathy, thyroid normal  Heart: Normal heart sounds, S1 and S2, No murmurs.  No carotid bruit, no edema of extremity.  Lungs: Normal breath sounds, Clear to auscultation bilateral  Skin exam: Warm and well perfused  Neurological exam: gait normal, alert and oriented X 3  Hem/Lymph/Immuno exam: negative findings:  no cervical nodes, no supraclavicular nodes,         "

## 2021-06-17 NOTE — PROGRESS NOTES
Assessment/Plan:     1.  Coronary artery disease: Recently admitted on 4/5/2018 with STEMI.  coronary  angiogram showed on single-vessel disease with  of LAD but unsuccessful PCI.  Dual antiplatelet therapy is being used with aspirin indefinitely and ticagrelor.  Patient currently does not have a insurance and paying out of pocket.  He has a  from recent hospitalization working on insurance.   We discussed the importance of antiplatelet therapy and talking with his cardiologist prior to stopping these medications for any reason.      Patient reports significant improvement in his shortness of breath but continues to feel fatigue.  He denies having any chest pain, heart palpitation, shortness of breath, lightheaded or dizziness.  His weight is down at least 20 pounds since recent hospitalization.  Provided coupons for Brilinta.  Patient was highly encouraged to contact us if issue with insurance coverage so he can be switched to alternative therapy.    Risk factor modification and lifestyle management topics were discussed including managing comorbidities, weight loss, heart healthy diet and exercise.  Cardiac rehab has been ordered But has not made the appointment due to insurance coverage.  Encouraged to start taking short walk as tolerated.     2.  Dyslipidemia: Daniele Becker is on high intensity statin therapy with Atorvastatin 80 mg daily. His most recent LDL is 113.  We discussed a diet low in saturated fat, weight loss, and exercise along with medication for better control of cholesterol.     3.  Hypotension:  96/64, heart rate 92.  Asymptomatic.  Reports low blood pressure in 80s-90s systolic as his baseline.  On carvedilol and Lasix.    4.  Ischemic cardiomyopathy with systolic dysfunction, NYH Class II with an EF of 17%: Well compensated except mild crackles in left lower lobe.  Following low-sodium diet.  Lost about 20 pounds since recent hospitalization. Recommended LifeVest by our  cardiology but he doesn't have insurance. We discussed about the heart failure symptoms, medication management, and lifestyle management including low-sodium diet and regular regular physical exercise as tolerated.    Scored 0 for [place] Sleepiness Scale and 4 for stop bang questionnaire-low to moderate risk-patient is not interested in sleep study as he does not have insurance right now.    Increased carvedilol from 3.125 mg twice a day to 6.25 mg twice a day.  Encouraged to call if experiencing lightheaded or dizziness.  His home recorded BP systolic has been running mostly in 100s-110s.  Decreased furosemide to 20 mg once a day given euvolemic and a low BP to stay higher for the carvedilol titration. Encouraged to limit his fluid intake to <64 ounces per day (drinks >2L/da). Recommended to avoid heavy exertion or lifting anything greater than 25 pounds given low EF.  Highly  encouraged to call if experiencing worsening heart failure symptoms.  He is having a cardiac MRI tomorrow.     Follow-up with Dr. Israel in 4-8 weeks.  Follow-up with me in 3 weeks.    Subjective:     Daniele Becker is a 52 y.o. years old  with a significant PMH of recent acute MI involving LAD, dyslipidemia, anemia, former tobacco abuse, and ischemic cardiomyopathy with systolic dysfunction who is seen at [facility] for post coronary intervention follow up.  Recently admitted on 4/5/2018 with STEMI. Coronary angiogram showed single-vessel disease with  of LAD but unsuccessful PCI.  Dual antiplatelet therapy is being used with aspirin indefinitely and ticagrelor.  Patient currently does not have a insurance and paying out of pocket.  He has a  from recent hospitalization working on insurance.His most recent echocardiogram done on 4/5/2018 showed an EF of 17%.  He was recommended to wear LifeVest unfortunately he does not have insurance and therefore declined to wear LifeVest.  He is scheduled to have cardiac MRI  tomorrow study the viability of anteroseptal wall.     Patient presented to the heart care clinic accompanied by his wife.  He reports significant improvement in his shortness of breath.  He continues to have some fatigue.  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  He reports that he resumed his work and has been tolerating.  His blood pressure from home has been running mostly in 100-110s systolic.  He reports his baseline blood pressure systolic and 80s-90s. He denies lightheadedness, shortness of breath, orthopnea, PND, palpitations, chest pain, abdominal fullness/bloating and lower extremity edema.      He lost about 15-20 pounds since recent hospitalization.  His home weight has been in 194 -198 lbs.  He is following low-sodium diet.  He does not participate in regular physical exercise but he works in the construction involves a lot of physical activities and exertion.    Review of Systems:   General: WNL  Eyes: WNL  Ears/Nose/Throat: Nosebleeds  Lungs: WNL  Heart: WNL  Stomach: WNL  Bladder: WNL  Muscle/Joints: WNL  Skin: WNL  Nervous System: WNL  Mental Health: WNL     Blood: WNL     Patient Active Problem List   Diagnosis     Dyslipidemia     Tobacco dependence in remission     Ischemic cardiomyopathy     Coronary artery disease due to lipid rich plaque     Acute systolic congestive heart failure     ST elevation myocardial infarction involving left anterior descending (LAD) coronary artery       Past Medical History:   Diagnosis Date     Coronary artery disease      Dyslipidemia      Hypertension      Tobacco dependence in remission 4/5/2018       Past Surgical History:   Procedure Laterality Date     CV CORONARY ANGIOGRAM N/A 4/5/2018    Procedure: Coronary Angiogram;  Surgeon: Wilber Darby MD;  Location: [facility] Lab;  Service:      CV LEFT HEART CATHETERIZATION WO LEFT VETRICULOGRAM Left 4/5/2018    Procedure: Left Heart Catheterization Without Left Ventriculogram;  Surgeon: Wilber  MD Mehnaz;  Location: [facility];  Service:        Family History   Problem Relation Age of Onset     Huber Father      ' at 76 from rupture of vessel'       Social History     Social History     Marital status:      Spouse name: N/A     Number of children: N/A     Years of education: N/A     Occupational History     Not on file.     Social History Main Topics     Smoking status: Former Smoker     Types: Cigarettes     Quit date: 2016     Smokeless tobacco: Former User     Types: Snuff     Alcohol use Yes      Comment: last drink 2 weeks ago     Drug use: No     Sexual activity: Not on file     Other Topics Concern     Not on file     Social History Narrative    Works at [employer].  and lives with wife.     Has grandchildren and wife babysits them.        Eun Castro MD  2018        No medical insurance yet and this has been a barrier in getting adequate cares.  Unable to get a LifeVest as they have to pay out of pocket.    Followed by  at [facility].        Eun Castro MD  2018                       Current Outpatient Prescriptions   Medication Sig Dispense Refill     albuterol (PROAIR HFA;PROVENTIL HFA;VENTOLIN HFA) 90 mcg/actuation inhaler Inhale 2 puffs every 6 (six) hours as needed for wheezing.       aspirin 81 mg chewable tablet Chew 1 tablet (81 mg total) daily.  0     atorvastatin (LIPITOR) 80 MG tablet Take 1 tablet (80 mg total) by mouth daily. 30 tablet 11     carvedilol (COREG) 3.125 MG tablet Take 2 tablets (6.25 mg total) by mouth 2 (two) times a day. 120 tablet 11     furosemide (LASIX) 20 MG tablet Take 1 tablet (20 mg total) by mouth daily. 60 tablet 11     ticagrelor (BRILINTA) 90 mg Tab Take 1 tablet (90 mg total) by mouth 2 (two) times a day. 60 tablet 11     No current facility-administered medications for this visit.        Allergies   Allergen Reactions     No Known Drug Allergies        Objective:     Vitals:     04/19/18 1456   BP: 90/64   Pulse: 92   Resp: 20     Body mass index is 31.34 kg/(m^2).    General Appearance:   Alert, cooperative and in no acute distress.   HEENT:  No scleral icterus; the mucous membranes were pink and moist. No JVD or HJR   Chest: The spine was straight. The chest was symmetric.   Lungs:   Respirations unlabored; the lungs are clear to auscultation except mild crackles in LLL.   Cardiovascular:   Regular rhythm. S1 and S2 without murmur, clicks or rubs. Carotid pulses are intact and symmetrical.  No carotid bruits noted.   Abdomen:  Soft, nontender, nondistended, bowel sounds present   Extremities: No cyanosis, clubbing, or edema.   Skin: No xanthelasma.   Neurologic: Mood and affect are appropriate.   Puncture site: Right radial site is soft with no bruising.  Radial pulses and Pedal pulses intact and symmetrical.  CMS intact.         Lab Review   Lab Results   Component Value Date    CREATININE 0.95 04/07/2018    BUN 18 04/07/2018     04/07/2018    K 4.1 04/07/2018     04/07/2018    CO2 24 04/07/2018     Creatinine (mg/dL)   Date Value   04/07/2018 0.95   04/06/2018 0.87   04/05/2018 0.81   04/05/2018 0.89     Echocardiogram on 4/5/2018  Summary     Left ventricle ejection fraction is severely decreased. The calculated left ventricular ejection fraction is 17% with akinesis to dyskinesis of the mid to distal anteroseptal, anterior, anterolateral, inferior and apical walls. No apical thrombus    Normal right ventricular size and systolic function.    No significant valvular heart disease    No previous study for comparison.           50 minutes were spent with the patient with greater than 50% spent on education and counseling.      Eliazar Do [facility]     This note has been dictated using voice recognition software. Any grammatical, typographical, or context distortions are unintentional and inherent to the software

## 2021-06-17 NOTE — PROGRESS NOTES
Assessment:     1. SOB (shortness of breath)  Electrocardiogram Perform - Clinic    HM1(CBC and Differential)    Comprehensive Metabolic Panel    HM1 (CBC with Diff)    CANCELED: XR Chest 1 View    CANCELED: XR Chest 2 Views   2. Cough  CANCELED: XR Chest 2 Views   3. Fatigue     4. Pulmonary congestion     5. Abnormal EKG          Shortness of breath secondary to Cardiac event with patient likely in CHF.  He may be plateauing out after the acute event with worsening of SOB showing worsening lung edema.    Radiology read shows pneumonia.    Patient will benefit from Beta natri peptide, cardiac trop and echo.  Paged  to Cardiologist.      Paged and talked to Cardiology noting the abnormal EKG- Rapid access available in 1-2 days. Patient very SOB and diuresing patient outpatient not a good option so sent to ED.  CXR per my read was Pulmonary edema and again called Radiology and discussed. She agrees with clinical findings as initial read was Multi lobar pneumonia.    At this time , it will be best for further evaluation in ED.  Patient declines ambulance transport, hesitant initially  to go to ED ( lack of insurance, and just wanted some antibiotics), butstable to take personal transport.  .       Plan       Detailed discussion regarding needing cardiac evaluation.  Patient Instructions   Please go to Emergency room    I have talked to Cardiology and you will likely need additional testing and an echocardiogram.        Eun Castro MD  4/5/2018              Subjective:      Daniele Becker is a 52 y.o. male who presents for evaluation of shortness of breath. Dyspnea has been moderate, and generally lasting all time. Episodes usually occur intermittently and have been unchanged. Associated symptoms include: cough with clear sputum and light-headed/dizzy. The symptoms are aggravated by exertion, and relieved by nothing.      Patient had acute illness on March 24.  This he describes it nausea vomiting and then  followed by some loose stools.  On asking leading question he does admit to chest pain bilateral.  Lasting for 2 days.  Then the chest pain went away first on the right side and then on the left side.  The nausea and vomiting have resolved.  Subsequently he developed shortness of breath and a dry cough.  His shortness of breath has been worsening for the last 6 days.  He feels winded down walking about 15 steps.    There is no report of fever.  No abnormal rashes.  They do say they have been exposed to pneumonia/influenza/bronchitis taking care of the grandkids.      The following portions of the patient's history were reviewed and updated as appropriate: allergies, current medications, past family history, past medical history, past social history, past surgical history and problem list.    Allergies   Allergen Reactions     No Known Drug Allergies      Other Food Allergy      Lopez (orange beverage)       No current facility-administered medications on file prior to visit.      No current outpatient prescriptions on file prior to visit.       Patient Active Problem List   Diagnosis     Dyspnea     ACS (acute coronary syndrome)     STEMI (ST elevation myocardial infarction)     Dyslipidemia     Tobacco dependence in remission       Past Medical History:   Diagnosis Date     Coronary artery disease      Dyslipidemia      Hypertension      Tobacco dependence in remission 2018       No past surgical history on file.    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     Has grandchildren and wife babysits them.        Eun Castro MD  4/5/2018                 Review of Systems  Constitutional: positive for anorexia and fatigue, negative for chills and fevers  Eyes: negative  Ears, nose, mouth, throat, and face: negative  Respiratory: positive for cough, dyspnea on exertion and wheezing, negative for chronic bronchitis, hemoptysis and sputum  Cardiovascular: positive for fatigue, negative for chest pain and thogh had CP 10 days ago  Gastrointestinal: negative, see HPI for N/V/D on 03/24  Genitourinary:negative  Integument/breast: negative  Hematologic/lymphatic: negative  Musculoskeletal:negative  Neurological: negative  Behavioral/Psych: negative  Endocrine: negative  Allergic/Immunologic: negative      Objective:      Physical Exam  /82 (Patient Site: Right Arm, Patient Position: Sitting, Cuff Size: Adult Large)  Pulse 100  Temp 97.8  F (36.6  C) (Oral)   Resp 22  Wt 215 lb 9.6 oz (97.8 kg)  SpO2 95%      General: Tachypneic and Tachycardic  Head:  NCAT w/o lesions or tenderness  Eyes: conjunctivae/corneas clear. PERRL, EOM's intact. Fundi benign  Ears: normal TM's and external ear canals bilateral  Sinus tender: negative  Nose: Erythematous turbinates  Mouth: lips, mucosa, and tongue normal. Teeth and gums normal. No tonsillar endangerment , Mild erythema of pharynx  Neck: supple, symmetrical, trachea midline.  Lungs:  Reduced breath sounds. No wheezes  Heart: RRR, No murmurs, No lower extremity edema  Abdomen: Soft NTND, No HSM,   Skin: Rough , eczematous hands      Cardiographics  ECG: lateral ST elevation, lateral MI and q wave changes  Echocardiogram: Sent to hospital  Per my read compared with previous read.    Imaging  Chest x-ray: infiltrates: lower lobe bilaterally and pulmonary edema   Per my read    Lab Review   Lab Results   Component Value Date      04/05/2018    K 4.2 04/05/2018     04/05/2018    CO2 22 04/05/2018    BUN 15 04/05/2018    CREATININE 0.81 04/05/2018    CALCIUM 8.9 04/05/2018     Lab Results   Component Value Date    WBC 11.3 (H) 04/05/2018    HGB 13.1 (L) 04/05/2018    HCT 38.8 (L) 04/05/2018    MCV 85 04/05/2018     (H) 04/05/2018

## 2021-06-18 NOTE — PROGRESS NOTES
Notes Recorded by Azucena Wong, RN on 6/1/2018 at 11:21 AM  Dr Garcia-  Please review pts chart  Pt not on anticoagulation  I can schedule [facility] to do screening AM of procedure  Ok to set this up with you?  Thank you,  Bel  ------    Notes Recorded by Jasiel Vu MD on 5/31/2018 at 9:24 PM  Holter monitor dated May 25, 2018 is reviewed. The patient demonstrates normal heart rate response.  Will schedule patient for S-ICD with Dr garcia.    ------ECG with extensive anterio MI pattern  Narrow QRS  No AV conduction; no LBbb  ADVANCED I-ccm WITH LARGE ANTERIOR INFARCT PATTERN  pLAN  pROCEED TO icd  DO SCREEN IN csc-IF HE SCREENS ok, THEN PROCEED TO sqicd; OTHERWISE WOULD DO SINGLE CHAMBER icd  FOR scicd WILL NEED ANESTHESIA FOR ENTIRE CASE

## 2021-06-18 NOTE — PROGRESS NOTES
1965  Home:[phone] (home) Cell:[phone] (mobile)  Emergency Contact: Riya Becker [phone]    +++Important patient information for [facility]/Cath Lab staff : [facility] to screen for SICD prior to case+++    [facility] EP Cath Lab Procedure Order     Device Implant/Revision:  Procedure: New Implant  Device Type:  SICD  Device Company/Device Rep Needed for Procedure: [employer]    Diagnosis:  Cardiomyopathy  Anticipated Case Duration:  Standard  Scheduling Needs/Timeframe:  Next Available  Anesthesia:  General-Whole Case  Research Protocol:  No    [facility] EP Cath Lab Prep   Ordering Provider: Dr Fernández  Ordering Date: 6/18/2018  Orders Status: Intial order placed and Order set placed  EP NC Contact: Lupe Bobby RN    H&P:  to be completed 6/28 [facility] Dr. Israel  PCP: Eun Castro MD, [phone]    Pre-op Labs: Ordered AM of procedure    Medical Records Pertinent for Procedure:  Holter 5/23/18, CT/MRI 4/20/18, Echo 4/5/18 and EKG 4/6/18    Patient Education:    Teach with Patient: Will be completed via phone prior to procedure, and letter was also sent to pt via mail/[employer] with written pre-procedural instructions.    Risks Reviewed:        Internal Cardiac Defibrillator     <1% for each of the following: infection, bleeding, hematoma,   pneumothorax, subclavian vein thrombosis, cardiac perforation, cardiac tamponade, arrhythmias, pectoral or diaphragmatic stimulation, air embolus, pocket erosion.    <4 % lead dislodgment; <1% lead fracture or generator malfunction.    <0.5% CVA or MI.     <0.1% death.    If external defibrillation is needed, 25% risk for superficial burn.    <5% risk of ICD system revision.    >95% VT/VF success implant rate.    Risks associated with general anesthesia will be addressed by the Anesthesiology Department.    For patients on anticoagulation, the risk of bleeding, hematoma, tamponade, and stroke with partial withdrawal are increased.    Patient education also completed on  CRISTOBAL, spurious shocks, driving limitation, and psychological and social aspects of having an ICD.      Consent: Will be obtained in [facility] day of procedure    Pre-Procedure Instructions that were Reviewed with Patient:  NPO after midnight, Remove all jewelry prior to coming in for procedure, Shower prior to arrival, Notified patient of time and date of procedure by CV , Transportation arrangements needed s/p procedure, Post-procedure follow up process, Sedation plan/orders and Pre-procedure letter was sent to pt by CV     Pre-Procedure Medication Instructions:  Instructions given to pt regarding anticoagulants: Pt is not on an anticoagulant- N/A  Instructions given to pt regarding antiarrhythmic medication: N/A for this type of procedure; N/A  Instructions for medication, other than anticoagulants/antiarrhythmics listed above, given to pt: to take all morning medications with small sips of water, with the exception of OTC supplements and MVI      Allergies   Allergen Reactions     No Known Drug Allergies        Current Outpatient Prescriptions:      acetaminophen (TYLENOL) 650 MG CR tablet, Take 650 mg by mouth 5 (five) times a day. , Disp: , Rfl:      albuterol (PROAIR HFA;PROVENTIL HFA;VENTOLIN HFA) 90 mcg/actuation inhaler, Inhale 2 puffs every 6 (six) hours as needed for wheezing., Disp: , Rfl:      aspirin 81 mg chewable tablet, Chew 1 tablet (81 mg total) daily., Disp: , Rfl: 0     atorvastatin (LIPITOR) 80 MG tablet, Take 1 tablet (80 mg total) by mouth daily., Disp: 30 tablet, Rfl: 11     carvedilol (COREG) 3.125 MG tablet, Take 2 tablets (6.25 mg total) by mouth 2 (two) times a day., Disp: 120 tablet, Rfl: 11     furosemide (LASIX) 20 MG tablet, Take 0.5 tablets (10 mg total) by mouth daily., Disp: 60 tablet, Rfl: 11     lisinopril (PRINIVIL,ZESTRIL) 5 MG tablet, Take 1 tablet (5 mg total) by mouth daily., Disp: 30 tablet, Rfl: 11     ticagrelor (BRILINTA) 90 mg Tab, Take 1 tablet (90 mg  total) by mouth 2 (two) times a day., Disp: 60 tablet, Rfl: 11    Documentation Date:6/18/2018 1:56 PM  Azucena Bobby RN

## 2021-06-18 NOTE — PROGRESS NOTES
Subjective:      Patient ID: Daniele Becker is a 52 y.o. male.    Chief Complaint:    HPI Daniele Becker is a 52 y.o. male with PMHx of chronic systolic heart failure, coronary artery disease, hypertension who presents today complaining of sinus pressure ×1 day. Patient has taken Tylenol sinus for his symptoms. Patient is here to know what he should take because of his heart hx. Patient has a hx of sinus infections.       Past Medical History:   Diagnosis Date     Chronic systolic heart failure 5/23/2018     Coronary artery disease      Dyslipidemia      Hypertension      Ischemic cardiomyopathy 4/6/2018    LVEF 17% echo May 2018 (anterior, apical, inferior apical akinesis) Coronary angio May 5, 2018:  prox LAD, R >L collaterals attempted PCI (unsuccessful) Cardiac MRI April 20, 2018: LVEF 22% transmural MI (nonviable) apical, distal anterior/ anterolateral/ septal/ basal walls     Tobacco dependence in remission 4/5/2018       Social History   Substance Use Topics     Smoking status: Former Smoker     Types: Cigarettes     Quit date: 05/2016     Smokeless tobacco: Former User     Types: Snuff     Alcohol use Yes      Comment: last drink 2 weeks ago       Review of Systems   Constitutional: Negative for fever.   HENT: Positive for congestion, sinus pain (right sided) and sinus pressure. Negative for sore throat.         Positive for teeth pain   Respiratory: Negative for cough, shortness of breath and wheezing.    Allergic/Immunologic: Negative for environmental allergies.       Objective:     /52 (Patient Site: Right Arm, Patient Position: Sitting)  Pulse 83  Temp 99.1  F (37.3  C) (Oral)   Resp 20  Wt 190 lb 3.2 oz (86.3 kg)  SpO2 98%  BMI 29.79 kg/m2    Physical Exam   Constitutional: He appears well-developed and well-nourished. No distress.   HENT:   Head: Normocephalic and atraumatic.   Right Ear: Tympanic membrane, external ear and ear canal normal.   Left Ear: Tympanic membrane, external  ear and ear canal normal.   Nose: Right sinus exhibits maxillary sinus tenderness. Right sinus exhibits no frontal sinus tenderness. Left sinus exhibits no maxillary sinus tenderness and no frontal sinus tenderness.   Mouth/Throat: Uvula is midline. No oropharyngeal exudate, posterior oropharyngeal edema or posterior oropharyngeal erythema.   Eyes: Conjunctivae are normal.   Neck: Normal range of motion. Neck supple.   Cardiovascular: Normal rate, regular rhythm and normal heart sounds.  Exam reveals no gallop and no friction rub.    No murmur heard.  Pulmonary/Chest: Effort normal and breath sounds normal. No respiratory distress. He has no wheezes. He has no rales.   Lymphadenopathy:     He has no cervical adenopathy.   Skin: He is not diaphoretic.   Psychiatric: He has a normal mood and affect. His behavior is normal. Judgment and thought content normal.   Nursing note and vitals reviewed.    Clinical Decision Making:  Suspect viral etiology of sinusitis.  Recommend follow-up if he has persisting symptoms.  Explained supportive cares that are safe considering his significant cardiac past medical history.    Assessment:     Procedures    1. Sinusitis           Patient Instructions   1.  I recommend using plain Mucinex (guaifenesin) to help thin mucus secretions.  Adding a saline nasal spray or (Neti Pot) can also be helpful with clearing sinus congestion.  2.  Take Tylenol as needed for pain control. Take Tylenol 650mg every 4 hours pain relief.  Do not exceed 4000mg of acetaminophen in a 24 hour period.  3. If you develop cough use Coricidin HBP as needed. Do not use other decongestants or cough suppressants.   4.  Follow-up if you not having any improvement in your symptoms over the next 7 days.

## 2021-06-18 NOTE — PROGRESS NOTES
[facility] HEART [facility]  Arrhythmia Clinic  Jasiel Vu    Referring:      Assessment:         Ischemic cardiomyopathy: Severe ischemic heart myopathy with markedly reduced LVEF (22% by recent MRI).  The patient had a transmural infarct with no evidence of viability in the affected segments.  The patient is New York Heart Association class II.  He does meet class I indications for prophylactic ICD at this point in time.  He is on beta-blocker and ACE inhibitor therapy with limitation for further titration based on low blood pressure.  The patient's intrinsic heart rate is normal at baseline and was during his hospitalization.  The patient does not have conduction system disease manifest on twelve-lead EKG.  I recommended a 24-hour Holter monitor to assess his rate and rhythm status.  If this is relatively normal then the patient will be candidate for an S-ICD (subcutaneous) system implant.  If the patient has any bradycardia or evidence of other conduction system abnormalities then a transvenous system would be recommended.  A total of 35 minutes was spent discussing the risks and benefits of ICD implantation the patient.    Chronic systolic heart failure: The patient is New York Heart Association class II today.  The patient is compliant with his medical therapy.  He and his wife are extremely diligent about limiting sodium intake.  The patient's blood pressure is somewhat low today however he is asymptomatic.  He does have a follow-up in the heart failure clinic in the next few weeks and at that point if his blood pressures improve then consider further up titration of his carvedilol therapy.      Recommendations:    24-hour Holter monitor today to assess the patient's heart rate response to activities of daily living and assess conduction.    The patient will be contacted after that study for scheduling implant of a ICD system.  Subcutaneous versus transvenous will be determined based on the above  study.    Continue current medical therapy and follow-up in the heart failure clinic as scheduled      Patient Active Problem List   Diagnosis     Dyslipidemia     Tobacco dependence in remission     Ischemic cardiomyopathy     Coronary artery disease due to lipid rich plaque     ST elevation myocardial infarction involving left anterior descending (LAD) coronary artery     Chronic systolic heart failure       Subjective:  Daniele Becker (52 y.o. male) presents to the arrhythmia clinic after previously meeting me while hospitalized.  The patient reports no sustained palpitations lightheadedness presyncope or syncope.  The patient has been compliant with medical therapy.  He is made significant changes in lifestyle including cessation of tobacco and following a 2 g sodium restricted diet.  The patient has some dyspnea with mild/moderate activity.  He is not having any orthopnea PND or ankle edema.  The patient is here to discuss possible prophylactic ICD.  The patient understands that he is a potential candidate for primary prevention ICD given his ischemic cardiomyopathy.  The patient is not having any chest pain or pressure.    Current Outpatient Prescriptions   Medication Sig Dispense Refill     albuterol (PROAIR HFA;PROVENTIL HFA;VENTOLIN HFA) 90 mcg/actuation inhaler Inhale 2 puffs every 6 (six) hours as needed for wheezing.       aspirin 81 mg chewable tablet Chew 1 tablet (81 mg total) daily.  0     atorvastatin (LIPITOR) 80 MG tablet Take 1 tablet (80 mg total) by mouth daily. 30 tablet 11     carvedilol (COREG) 3.125 MG tablet Take 2 tablets (6.25 mg total) by mouth 2 (two) times a day. 120 tablet 11     furosemide (LASIX) 20 MG tablet Take 0.5 tablets (10 mg total) by mouth daily. 60 tablet 11     lisinopril (PRINIVIL,ZESTRIL) 2.5 MG tablet Take 1 tablet (2.5 mg total) by mouth daily. 30 tablet 0     ticagrelor (BRILINTA) 90 mg Tab Take 1 tablet (90 mg total) by mouth 2 (two) times a day. 60 tablet 11  "    No current facility-administered medications for this visit.        Review of Systems:   General: WNL  Eyes: WNL  Ears/Nose/Throat: WNL  Lungs: WNL  Heart: WNL  Stomach: WNL  Bladder: WNL  Muscle/Joints: WNL  Skin: WNL  Nervous System: WNL  Mental Health: WNL     Blood: WNL    Family History  Family History   Problem Relation Age of Onset     Huber Father      ' at 76 from rupture of vessel'       Social History   reports that he quit smoking about 2 years ago. His smoking use included Cigarettes. He has quit using smokeless tobacco. His smokeless tobacco use included Snuff. He reports that he drinks alcohol. He reports that he does not use illicit drugs.    Objective:   Vital signs in last 24 hours:  BP (!) 80/58 (Patient Site: Left Arm, Patient Position: Sitting, Cuff Size: Adult Large)  Pulse 74  Resp 18  Ht 5' 7\" (1.702 m)  Wt 197 lb (89.4 kg)  BMI 30.85 kg/m2  Weight: Weight: 197 lb (89.4 kg)     Physical Exam:  General: The patient is alert oriented to person place and situation.  The patient is in no acute distress at the time of my evaluation.  Eyes: Pupils are equal, round, and reactive to light.  Conjunctiva and sclera are clear.  ENT: Oral mucosa is moist and without redness. No evident nasal discharge.  Pulmonary: Lungs are clear bilaterally with no rales, rhonchi, or wheezes.    Cardiovascular exam: Rhythm is regular occasional ectopic beats. S1 and S2 are normal. No significant murmur is present. JVP is normal. Lower extremities demonstrate no significant edema. Distal pulses are intact bilaterally.  Abdomen is flat, soft, and nontender.  Musculoskeletal: Spine is straight. Extremities without deformity.  Neuro: Gait is normal.   Skin is warm, dry, and otherwise intact.      Cardiographics:   24-hour Holter monitor was ordered    Lab Results:   Lab Results   Component Value Date     2018    K 4.1 2018     2018    CO2 24 2018    BUN 18 2018    " CREATININE 0.95 04/07/2018    CALCIUM 9.0 04/07/2018     Lab Results   Component Value Date    WBC 10.9 04/07/2018    HGB 13.1 (L) 04/07/2018    HCT 40.1 04/07/2018    MCV 88 04/07/2018     (H) 04/07/2018     Lab Results   Component Value Date    INR 1.08 04/05/2018         Outside record review:

## 2021-06-18 NOTE — PROGRESS NOTES
Pt was called, reviewed information, verbalized understanding, has no further questions at this time, contact information was given for further concerns/questions, scheduling notified to contact pt and order(s) were placed   6/18/2018 1:55 PM  Azucena Bobby RN

## 2021-06-18 NOTE — PROGRESS NOTES
Orders placed.  -[facility]    Notes Recorded by Eliazar Do NP on 6/5/2018 at 3:54 PM  Peyton Mckenzie,  His renal function is stable. I would like to increase his Lisinopril from 2.5 mg to 5 mg daily. Have him call if lightheaded or dizziness with low BP. I would recommend BMP check in 2 weeks.   Thank you  Eliazar

## 2021-06-18 NOTE — PROGRESS NOTES
[facility] Follow-up Note    Assessment & Plan        1. ST elevation myocardial infarction involving left anterior descending (LAD) coronary artery (H) -ST elevation anterior MI due to left into descending total occlusion.  MRI shows no viability.  No symptoms.  Work on prevention.   2. Coronary artery disease due to lipid rich plaque - coronary angiography April 5 showed normal left main, left anterior descending with a proximal total occlusion, normal circumflex, and the right coronary artery was normal.    As above viability study as outpatient showed no anterior viability so will not pursue pursue possible  procedure.   3. Ischemic cardiomyopathy -ejection fraction 17% and on MRI 21%.  Will recheck brief echo this week and is still diminished continue with plans for subcu ICD July 6.   4. Pure hypercholesterolemia -on atorvastatin with  from April 2018.  Will need to recheck.   5. Chronic systolic heart failure (H) -no signs or symptoms on exam currently.     Plan  1.  Check brief echo.  2.  We will need fasting lipids checked at some time.  3.  Follow-up with me after subcu ICD July 6.  4.  Take medications with a small sip of water the morning of procedure.    Subjective  CC: 52-year-old white gentleman being seen in post hospital discharge follow-up today.  He is back to work.  He tells me he fatigues early and does have shortness of breath and very heavy activity but denies any major mild shortness of breath, PND, orthopnea, chest discomfort, palpitations, syncope or dizziness.    Medications  Current Outpatient Prescriptions   Medication Sig     acetaminophen (TYLENOL) 650 MG CR tablet Take 650 mg by mouth 5 (five) times a day.      albuterol (PROAIR HFA;PROVENTIL HFA;VENTOLIN HFA) 90 mcg/actuation inhaler Inhale 2 puffs every 6 (six) hours as needed for wheezing.     aspirin 81 mg chewable tablet Chew 1 tablet (81 mg total) daily.     atorvastatin (LIPITOR) 80 MG tablet Take 1  "tablet (80 mg total) by mouth daily.     carvedilol (COREG) 3.125 MG tablet Take 2 tablets (6.25 mg total) by mouth 2 (two) times a day.     furosemide (LASIX) 20 MG tablet Take 0.5 tablets (10 mg total) by mouth daily.     lisinopril (PRINIVIL,ZESTRIL) 5 MG tablet Take 1 tablet (5 mg total) by mouth daily.     ticagrelor (BRILINTA) 90 mg Tab Take 1 tablet (90 mg total) by mouth 2 (two) times a day.     Past history  Cardiomyopathy  Coronary artery disease  MI    Past history is negative for cancer, tuberculosis, diabetes mellitus,   rheumatic fever, hypertension, cerebrovascular accident, chronic kidney disease, peptic ulcer disease, chronic obstructive pulmonary disease, or thyroid disorder.    Family history  Positive for aneurysm in his father.    Social history  , lives independently with his wife,  Works doing construction work  Quit cigarettes 2 years ago  Denies tobacco use currently  Denies alcohol use currently      Objective  /80 (Patient Site: Left Arm, Patient Position: Sitting, Cuff Size: Adult Regular)  Pulse 75  Resp 14  Ht 5' 7\" (1.702 m)  Wt 193 lb (87.5 kg)  BMI 30.23 kg/m2    General Appearance:    Alert, cooperative, no distress, appears stated age   Head:    Normocephalic, without obvious abnormality, atraumatic   Throat:   Lips, mucosa, and tongue normal; teeth and gums normal   Neck:   Supple, symmetrical, trachea midline, no adenopathy;        thyroid:  No enlargement/tenderness/nodules; no carotid    bruit or JVD   Back:     Symmetric, no curvature, ROM normal, no CVA tenderness   Lungs:     Clear to auscultation bilaterally, respirations unlabored   Chest wall:    No tenderness or deformity   Heart:    Regular rate and rhythm, S1 and S2 normal, no murmur, rub   or gallop   Abdomen:     Soft, non-tender, bowel sounds active all four quadrants,     no masses, no organomegaly   Extremities:   Normal, atraumatic, no cyanosis or edema   Pulses:   2+ and symmetric all " extremities   Skin:   Skin color, texture, turgor normal, no rashes or lesions     Results    Lab Results personally reviewed   Lab Results   Component Value Date    CHOL 157 04/06/2018     Lab Results   Component Value Date    HDL 27 (L) 04/06/2018     Lab Results   Component Value Date    LDLCALC 113 04/06/2018     Lab Results   Component Value Date    TRIG 86 04/06/2018     Lab Results   Component Value Date    WBC 10.9 04/07/2018    HGB 13.1 (L) 04/07/2018    HCT 40.1 04/07/2018     06/05/2018     Lab Results   Component Value Date    CREATININE 0.91 06/05/2018    BUN 13 06/05/2018     06/05/2018    K 4.8 06/05/2018    CO2 24 06/05/2018     Review of Systems:   General: WNL  Eyes: WNL  Ears/Nose/Throat: Hearing Loss  Lungs: WNL  Heart: WNL  Stomach: WNL  Bladder: WNL  Muscle/Joints: WNL  Skin: WNL  Nervous System: WNL        Blood: WNL

## 2021-06-18 NOTE — PROGRESS NOTES
IMPRESSION:  1.  A 24-hour Holter monitor was applied 05/23/2018 with date of interpretation  05/25/2018.  2.  Sinus rhythm is present with normal electrocardiographic intervals.  3.  Sinus rhythm is present with an average heart rate 87 beats per minute.  Heart  rate ranges between 63 to 115 beats per minute.  4.  Frequent ventricular ectopy with 6173 PVCs.  The PVCs are of a single site and  appear to originate in the mid left ventricular lateral region.  Rare couplets are  seen.  There are no salvos, no nonsustained ventricular tachycardia.    5.  Many of the PVCs occur between 7 to 9:00 a.m. when episodes of prolonged  ventricular bigeminy are noted.  6.  Supraventricular ectopy is rare with 106 PACs.  This does include a 4 beat coby  of atrial tachycardia at 5:00 p.m.  Otherwise, no atrial tachycardia, no atrial  fibrillation present.  7.  Diary review showed no symptoms.    Candidate for primary ICD  God candidate for SubQ system  Will need to do ECG screen AM prior to implant; if he screens OK, then proceed to SQ@ system; would need GA whole case

## 2021-06-19 NOTE — ANESTHESIA PREPROCEDURE EVALUATION
Anesthesia Evaluation      Patient summary reviewed   No history of anesthetic complications     Airway   Mallampati: II   Pulmonary     breath sounds clear to auscultation  (+) a smoker  (-) asthma, shortness of breath, recent URI, sleep apnea                         Cardiovascular   Exercise tolerance: > or = 4 METS  (+) hypertension, past MI (STEMI 4/2018), CAD, CHF, cardiomyopathy (LV EF 28%), hypercholesterolemia,     (-) angina  ECG reviewed  Rhythm: regular  Rate: normal,         Neuro/Psych    (-) no seizures, no neuromuscular disease, no CVA    Endo/Other    (-) no diabetes     GI/Hepatic/Renal       Other findings: Coronary angio 4/2018   Findings:  LM:no obstruction  LAD:100% occluded proximally, faint rPDA and septal R-L collaterals. C/w   Lcx:mildly irregular  RCA:dominant, mild diffuse irregularity, rPDA w/ 40-50% narrowing    7/2/18 ECHO     Summary      1.Left ventricle ejection fraction is severely decreased. The calculated left ventricular ejection fraction is 28%.    2.Normal right ventricular size and systolic function.    3.Significant akinesis involving septum and anterior apical wall and anterolateral wall of left ventricle without thrombus formation.    4.No hemodynamically significant valvular heart abnormalities, although this was a limited study without Doppler performed.    5.When compared to the previous study dated 4/5/2018, there are changes noted. The left ventricular ejection fraction might be minimally improved on current study.                Dental    (+) caps and chipped                       Anesthesia Plan  Planned anesthetic: general endotracheal  As needed arterial line   ASA 4   Induction: intravenous   Anesthetic plan and risks discussed with: patient  Anesthesia plan special considerations: antiemetics,   Post-op plan: routine recovery

## 2021-06-19 NOTE — ANESTHESIA CARE TRANSFER NOTE
Last vitals:   Vitals:    07/06/18 0842   BP:    Pulse:    Resp:    Temp: 37  C (98.6  F)   SpO2:      Patient's level of consciousness is drowsy  Spontaneous respirations: yes  Maintains airway independently: yes  Dentition unchanged: yes  Oropharynx: oropharynx clear of all foreign objects    QCDR Measures:  ASA# 20 - Surgical No Data Recorded  PQRS# 430 - Adult PONV Prevention: NA - Not adult patient, not GA or 3 or more risk factors NOT present  ASA# 8 - Peds PONV Prevention: NA - Not pediatric patient, not GA or 2 or more risk factors NOT present  PQRS# 424 - [place]-op Temp Management: NA - MAC anesthesia or case < 60 minutes  PQRS# 426 - PACU Transfer Protocol: - Transfer of care checklist used  ASA# 14 - Acute Post-op Pain: ASA14B - Patient did NOT experience pain >= 7 out of 10

## 2021-06-19 NOTE — LETTER
Letter by Carrie Rivera RDCS at      Author: Carrie Rivera RDCS Service: -- Author Type: --    Filed:  Encounter Date: 9/4/2019 Status: (Other)         Daniele Becker  [id number] [place] [id number]      September 4, 2019      Dear Lynn Rosita,    RE: Remote Results    We are writing to you regarding your recent Remote ICD check from home. Your transmission was received successfully. Battery status is satisfactory at this time.     Your results are showing no significant changes.    Your next device appointment will be a remote check on December 9, 2019; this will occur automatically.    To schedule or reschedule, please call [phone] and press 1.    NOTE: If you would like to do an extra transmission, please call [phone] and press 3 to speak to a nurse BEFORE transmitting. This ensures that the Device Clinic staff is aware of the reason you are sending a transmission, and can follow-up with you after it has been reviewed.    We will be checking your implanted device from home (remotely) every three months unless otherwise instructed. We will need to see you in the clinic at least once a year. You may need to be seen in the clinic sooner depending on the results of your check.    Please be aware:    The follow-up schedule is like a Physician prescription.    Your remote monitor is paired to your specific implanted device.      Sincerely,    [facility]

## 2021-06-19 NOTE — PROGRESS NOTES
Mr. Daniele Becker underwent ICD implantation July 6, 2018.  He did develop a hematoma and tenderness in the left subclavicular region  Because of the concern of breakdown of hematoma, he was advised not to use a shoulder strap- just a lap belt until the hematoma resolves  The irritation of the seatbelt over his left subclavicular hematoma could cause serious complications which might even be life-threatening   apparently he was given a ticket for not wearing a seatbelt by some police office  Apparently that  had  no compassion or empathy for his clinical situation  Since Mr. 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 Dancer  I feel the   should  undergo some type of sensitivity training to deal better with the citizens of his community  I feel that given Mr. Becker ticket in these circumstances is a  reflection of the police officers deficiencies

## 2021-06-19 NOTE — ANESTHESIA POSTPROCEDURE EVALUATION
Patient: Daniele Becker  EP ICD Insert, EP DFT Testing  Anesthesia type: general    Patient location: Telemetry/Step Down Unit  Last vitals:   Vitals:    07/06/18 1133   BP: 94/50   Pulse: 66   Resp: 22   Temp:    SpO2:      Post vital signs: stable  Level of consciousness: awake and responds to simple questions  Post-anesthesia pain: pain controlled  Post-anesthesia nausea and vomiting: no  Pulmonary: unassisted, return to baseline  Cardiovascular: stable and blood pressure at baseline  Hydration: adequate  Anesthetic events: no    QCDR Measures:  ASA# 11 - [place]-op Cardiac Arrest: ASA11B - Patient did NOT experience unanticipated cardiac arrest  ASA# 12 - [place]-op Mortality Rate: ASA12B - Patient did NOT die  ASA# 13 - PACU Re-Intubation Rate: NA - No ETT / LMA used for case  ASA# 10 - Composite Anes Safety: ASA10A - No serious adverse event    Additional Notes:

## 2021-06-19 NOTE — LETTER
Letter by Kelly Barbosa at      Author: Kelly Barbosa Service: -- Author Type: --    Filed:  Encounter Date: 6/3/2019 Status: (Other)         Daniele Becker  [address] [address]      Breanna 3, 2019      Dear yLnn Rosita,    RE: Remote Results    We are writing to you regarding your recent Remote ICD check from home. Your transmission was received successfully. Battery status is satisfactory at this time.     Your results are within normal limits.    Your next device appointment will be a remote check on September 4, 2019; this will occur automatically.    To schedule or reschedule, please call [phone] and press 1.    NOTE: If you would like to do an extra transmission, please call [phone] and press 3 to speak to a nurse BEFORE transmitting. This ensures that the Device Clinic staff is aware of the reason you are sending a transmission, and can follow-up with you after it has been reviewed.    We will be checking your implanted device from home (remotely) every three months unless otherwise instructed. We will need to see you in the clinic at least once a year. You may need to be seen in the clinic sooner depending on the results of your check.    Please be aware:    The follow-up schedule is like a Physician prescription.    Your remote monitor is paired to your specific implanted device.      Sincerely,    [facility]

## 2021-06-20 NOTE — PROGRESS NOTES
In clinic device check with Device RN and Dr. Israel.  Please see link for full device report.  Patient was informed of results and next follow up during today's visit.

## 2021-06-20 NOTE — LETTER
Letter by Kelly Barbosa at      Author: Kelly Barbosa Service: -- Author Type: --    Filed:  Encounter Date: 9/21/2020 Status: (Other)         Daniele Becker  [address] [place] [address]      September 21, 2020      Dear Mr. Becker    RE: Remote Results    We are writing to you regarding your recent Remote ICD check from home. Your transmission was received successfully. Battery status is satisfactory at this time.     Your results are showing no significant changes.    Your next device appointment will be a remote check on September 20, 2021; this will occur automatically.    To schedule or reschedule, please call [phone] and press 1.    NOTE: If you would like to do an extra transmission, please call [phone] and press 3 to speak to a nurse BEFORE transmitting. This ensures that the Device Clinic staff is aware of the reason you are sending a transmission, and can follow-up with you after it has been reviewed.    We will be checking your implanted device from home (remotely) every three months unless otherwise instructed. We will need to see you in the clinic at least once a year. You may need to be seen in the clinic sooner depending on the results of your check.    Please be aware:    The follow-up schedule is like a Physician prescription.    Your remote monitor is paired to your specific implanted device.      Sincerely,    [facility]

## 2021-06-20 NOTE — PROGRESS NOTES
[facility] Care Clinic Follow-up Note    Assessment & Plan        1. ST elevation myocardial infarction involving left anterior descending (LAD) coronary artery (H) -ST elevation anterior MI due to left into descending total occlusion.  MRI shows no viability.  No symptoms.  Work on prevention.  This was April 2018   2. Ischemic cardiomyopathy -ejection fraction 17% and on MRI 21%.  On recheck brief echo ejection fraction was 28% but still pursued ICD July 6.   3. Coronary artery disease of native heart with stable angina pectoris, unspecified vessel or lesion type (H)  - coronary angiography April 5 showed normal left main, left anterior descending with a proximal total occlusion, normal circumflex, and the right coronary artery was normal.    As above viability study as outpatient showed no anterior viability so will not pursue pursue possible  procedure.   4. Chronic systolic heart failure (H) -no signs or symptoms on exam currently and have asked him to try his furosemide on an as-needed basis.   5. Pure hypercholesterolemia -cholesterol 157 with an LDL of 113 from April 2018.  On maximum dose of atorvastatin 80 mg.   6. Tobacco dependence in remission -doing well.   7.  [employer] device-ICD with a less than 1% ventricular pacing and no arrhythmias noted.    Plan  1.  Consider enrollment in the [employer] heart failure trial.  2.  Recheck echo around March or April.  3.  Follow-up with me in April, a year out from his heart attack.    Subjective  CC: 53-year-old white gentleman is here for follow-up.  Since I seen him he had implantation of the ICD.  He is back at work and getting along well without any complaints.Patient complains of no syncope, dizziness, fatigue, fevers, chest pain, palpitations, shortness of breath, PND, orthopnea, nausea, vomiting, or edema.  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    Medications  Current Outpatient Prescriptions   Medication Sig      "acetaminophen (TYLENOL) 650 MG CR tablet Take 650 mg by mouth every 8 (eight) hours as needed for pain.      albuterol (PROAIR HFA;PROVENTIL HFA;VENTOLIN HFA) 90 mcg/actuation inhaler Inhale 2 puffs every 6 (six) hours as needed for wheezing.     aspirin 81 mg chewable tablet Chew 1 tablet (81 mg total) daily.     atorvastatin (LIPITOR) 80 MG tablet Take 1 tablet (80 mg total) by mouth daily.     carvedilol (COREG) 3.125 MG tablet Take 1 tablet (3.125 mg total) by mouth 2 (two) times a day. 3.125 mg + 6.25 mg in AM and 6.25 mg in PM (Patient taking differently: Take 3.125 mg by mouth daily. 3.125 mg + 6.25 mg in AM and 6.25 mg in PM)     carvedilol (COREG) 6.25 MG tablet Take 1 tablet (6.25 mg total) by mouth 2 (two) times a day with meals. 6.25 mg +3.125 mg in AM and 6.25 mg in PM     furosemide (LASIX) 20 MG tablet Take 0.5 tablets (10 mg total) by mouth daily.     lisinopril (PRINIVIL,ZESTRIL) 2.5 MG tablet Take 1 tablet (2.5 mg total) by mouth daily. Take 2.5 mg + 5 mg of Lisinopril (total 7.5 mg) daily     lisinopril (PRINIVIL,ZESTRIL) 5 MG tablet Take 1 tablet (5 mg total) by mouth daily.     ticagrelor (BRILINTA) 90 mg Tab Take 1 tablet (90 mg total) by mouth 2 (two) times a day.       Objective  BP (!) 86/60 (Patient Site: Left Arm, Patient Position: Sitting, Cuff Size: Adult Regular)  Pulse 64  Resp 16  Ht 5' 7.5\" (1.715 m)  Wt 202 lb 9.6 oz (91.9 kg)  BMI 31.26 kg/m2    General Appearance:    Alert, cooperative, no distress, appears stated age   Head:    Normocephalic, without obvious abnormality, atraumatic   Throat:   Lips, mucosa, and tongue normal; teeth and gums normal   Neck:   Supple, symmetrical, trachea midline, no adenopathy;        thyroid:  No enlargement/tenderness/nodules; no carotid    bruit or JVD   Back:     Symmetric, no curvature, ROM normal, no CVA tenderness   Lungs:     Clear to auscultation bilaterally, respirations unlabored   Chest wall:    No tenderness, left-sided ICD noted "   Heart:    Regular rate and rhythm, S1 and S2 normal, no murmur, rub   or gallop   Abdomen:     Soft, non-tender, bowel sounds active all four quadrants,     no masses, no organomegaly   Extremities:   Normal, atraumatic, no cyanosis or edema   Pulses:   2+ and symmetric all extremities   Skin:   Skin color, texture, turgor normal, no rashes or lesions     Results    Lab Results personally reviewed   Lab Results   Component Value Date    CHOL 157 04/06/2018     Lab Results   Component Value Date    HDL 27 (L) 04/06/2018     Lab Results   Component Value Date    LDLCALC 113 04/06/2018     Lab Results   Component Value Date    TRIG 86 04/06/2018     Lab Results   Component Value Date    WBC 12.0 (H) 07/06/2018    HGB 12.0 (L) 07/06/2018    HCT 36.4 (L) 07/06/2018     07/06/2018     Lab Results   Component Value Date    CREATININE 1.13 08/07/2018    BUN 28 (H) 08/07/2018     08/07/2018    K 4.7 08/07/2018    CO2 23 08/07/2018     Review of Systems:   General: WNL  Eyes: WNL  Ears/Nose/Throat: WNL  Lungs: WNL  Heart: WNL  Stomach: WNL  Bladder: WNL  Muscle/Joints: WNL  Skin: WNL  Nervous System: WNL  Mental Health: WNL     Blood: WNL

## 2021-06-21 NOTE — PROGRESS NOTES
This note concerns an educational visit related to the Phase I heart failure Myokardia clinical trial.    I spoke with Mr. Becker and his wife for approximately 30 minutes about the Phase I study.  The study was described in detail, including a summary of risks and benefits.  It was clearly indicated that this study will not provide the patient with a benefit to his health.  The patient understands that this study includes an 11 day stay at [facility].     The patient and his wife were given an opportunity to ask questions about the study.      The patient is interested in learning more about this study.  I will call later in the week to schedule a consenting visit with the study team.

## 2021-06-21 NOTE — PROGRESS NOTES
CARY JOSE (Key: [id number])  Brilinta 90MG OR TABS  Status: PA Request  Created: November 12th, 2018  Sent: November 12th, 2018

## 2021-06-23 NOTE — PATIENT INSTRUCTIONS - HE
Daniele Becker,    It was a pleasure to see you today at the [facility].     My recommendations after this visit include:    - I increased your Carvedilol from 15.625 two times a day to 18.75 mg two times a day (12.5 +2 tablets of 3.125 mg ). Please call if you feel lightheaded or dizziness or excessive fatigue.     - Follow up in Heart Failure Clinic with Eliazar in 4 weeks    - Please call Peyton Mckenzie RN on [phone], if you have any questions or concerns    Eliazar Do, CNP

## 2021-06-25 NOTE — PATIENT INSTRUCTIONS - HE
Daniele Becker,    It was a pleasure to see you today at the [facility].     My recommendations after this visit include:    - I increased your Carvedilol from 18.75 mg two times a day to 25 mg two times a day. Please call if you feel lightheaded, dizzy or excessive fatigue.     - Follow up with Dr. Israel as scheduled in April 2019    - Follow up in Heart Failure Clinic with Eliazar in 8-10 weeks    - Please call Peyton Mckenzie RN on [phone], if you have any questions or concerns    Eliazar Do, CNP

## 2021-06-26 NOTE — PROGRESS NOTES
Progress Notes by Eliazar Do NP at 7/17/2018  8:30 AM     Author: Eliazar Do NP Service: -- Author Type: Nurse Practitioner    Filed: 7/17/2018  9:52 AM Encounter Date: 7/17/2018 Status: Signed    : Eliazar Do NP (Nurse Practitioner)           Click to link to [facility] Heart Care     [facility] HEART CARE NOTE      Assessment/Recommendations   Assessment:    1. Ischemic cardiomyopathy with systolic dysfunction, NYHA class II with EF of 28% in 6/2018: Well compensated although his weight is up 3-4 pounds above his baseline.  Patient reports improved appetite and has increased his oral intake although following low-sodium and heart healthy diet.  He has been tolerating the increased dose of lisinopril.  His left clavicle incision site has mild hematoma but reports feeling improvement.  There is no signs of infection.      He had a device check today showed normal device function with presenting heart rhythm normal sinus rhythm in 70s.  Per patient, Dr. Fernández also checked his incision site and gave specific instructions about for his upcoming dental procedure in August 2018.    2.  Coronary artery disease with s/p STEMI involving left anterior descending: Known  of proximal LAD with MRI showed no viability. Dr. Israel has recommended to work on prevention.  Patient is asymptomatic.  He is currently on aspirin 81 mg daily and ticagrelor 90 mg twice a day.  Patient reports no further bleeding episodes since last clinic visit.  He is planning to have a dental procedure on  August, 17 2018 and wants to know if he needs to hold his antiplatelet.  Will discuss this with Dr. Israel.    3.  His blood pressure today is 110/78 and heart rate 80.  His home recorded blood pressure has been systolic mostly in 110s or greater.    Plan:  1.   Heart failure medications:  - Beta blocker therapy with carvedilol 6.25 mg twice a day  - ACEI therapy with lisinopril increased from 5 mg to 7.5 mg daily.  If no  lightheaded or dizziness in a week increase it to 10 mg daily.  Patient and his wife both feels comfortable with the plan. Recommended  to go to ED if throat/tongue swelling or difficulty breathing.  - Last BMP  stable on 7/with mild elevation of BUN. 2/18-recommended adequate hydration (1.5-2L/fluid/day)  - Diuretic therapy with furosemide 10 mg daily.    2.  Encouraged to follow all the restriction on the left arm to prevent further worsening of hematoma.  Recommended ice application.  Encouraged to call if worsening swelling, pain or drainage from the left clavicle incision site.    3.  Reinforced low-sodium and heart healthy diet.  Patient declined cardiac rehab stating he is very busy and committed to his current project.  Highly recommended to walk every day 30-60 minutes daily as tolerated.    Follow up with Eliazar in 6-8 weeks.     History of Present Illness     Daniele Becker is a 52 y.o. years old  with a significant PMH of recent acute MI involving LAD,  of proximal LAD with no viability on MRI,, dyslipidemia, anemia, former tobacco abuse, and ischemic cardiomyopathy with systolic dysfunction who is seen at [facility] for continued follow-up.  His most recent echocardiogram done on 4/5/2018 showed an EF of 27%. He underwent successful placement of single-chamber ICD on 7/6/2018. Patient had a device check today showed normal device function.  Patient has developed a mild hematoma on his left clavicle device site but is improving.  He had a device check today and  his incision site was also assessed by Dr. Fernández.  Patient saw Dr. Israel recently prior to ICD placement and has recommended to work on prevention for his coronary artery disease.    Today, patient presented to the heart care clinic accompanied by his wife.  Patient reports feeling improvement in his heart failure symptoms every day although his weight is up 4 pounds.  He has been tolerating the increased dose of lisinopril and  his recent kidney function is stable except mild elevation of BUN. He denies fatigue, lightheadedness, shortness of breath, dyspnea on exertion, orthopnea, PND, palpitations, chest pain, abdominal fullness/bloating and lower extremity edema.  He reports no further bleeding episodes since his last clinic visit. He recently had a acute sinus infection and dental problem was recommended by the dentist to undergo some oral procedure.  Patient wanted to know if he needs to hold his antiplatelet for the procedure.    Daniele continues to work full-time and his work involves a lot of physical activity and has been tolerating well.  He declined cardiac rehab due to his busy schedule and work commitment.  However, he is willing to take time and go for walk every day and eventually would like to restart biking. He is monitoring home weights which are stable between 191-195 pounds.  He is following a low sodium diet.        Physical Examination Review of Systems   Vitals:    07/17/18 0720   BP: 110/78   Pulse: 80   Resp: 16   Temp: 98.1  F (36.7  C)     Body mass index is 31.32 kg/(m^2).  Wt Readings from Last 3 Encounters:   07/17/18 200 lb (90.7 kg)   07/06/18 192 lb 9.6 oz (87.4 kg)   07/02/18 193 lb (87.5 kg)       General Appearance:     Alert, cooperative and in no acute distress.   ENT/Mouth: membranes moist, no oral lesions or bleeding gums.      EYES:  no scleral icterus, normal conjunctivae   Neck: no carotid bruits or thyromegaly   Chest/Lungs:   lungs are clear to auscultation, no rales or wheezing, respirations unlabored. Left clavicle incision site appears mild swelling and firm with some bruising- non tender with no infection. Patient thinks it is less swollen.   Cardiovascular:    Normal first and second heart sounds (distant) with no murmurs, rubs, or gallops; the carotid, radial and posterior tibial pulses are intact, Jugular venous pressure flat with no HJR, no edema bilateral lower extremities    Abdomen:   Soft, nontender, nondistended, bowel sounds present   Extremities: no cyanosis or clubbing   Skin: warm, dry.    Neurologic: mood and affect are appropriate, alert and oriented x3      General: WNL  Eyes: WNL  Ears/Nose/Throat: WNL  Lungs: WNL  Heart: WNL  Stomach: WNL  Bladder: WNL  Muscle/Joints: WNL  Skin: WNL  Nervous System: WNL  Mental Health: WNL     Blood: WNL     Medical History  Surgical History Family History Social History   Past Medical History:   Diagnosis Date   ? Chronic systolic heart failure (H) 2018   ? Coronary artery disease    ? Dyslipidemia    ? Hypertension    ? Ischemic cardiomyopathy 2018    LVEF 17% echo May 2018 (anterior, apical, inferior apical akinesis) Coronary angio May 5, 2018:  prox LAD, R >L collaterals attempted PCI (unsuccessful) Cardiac MRI 2018: LVEF 22% transmural MI (nonviable) apical, distal anterior/ anterolateral/ septal/ basal walls   ? Tobacco dependence in remission 2018    Past Surgical History:   Procedure Laterality Date   ? CV CORONARY ANGIOGRAM N/A 2018    Procedure: Coronary Angiogram;  Surgeon: Wilber Darby MD;  Location: [facility] Cath [facility];  Service:    ? CV LEFT HEART CATHETERIZATION WO LEFT VETRICULOGRAM Left 2018    Procedure: Left Heart Catheterization Without Left Ventriculogram;  Surgeon: Wilber Darby MD;  Location: [facility] Cath [facility];  Service:    ? CYST REMOVAL      neck   ? EP ICD INSERT N/A 2018    Procedure: EP ICD Insert;  Surgeon: Hany Fernández MD;  Location: [facility] Cath Lab;  Service:    ? KNEE ARTHROSCOPY      Family History   Problem Relation Age of Onset   ? Anuerysm Father      ' at 76 from rupture of vessel'    Social History     Social History   ? Marital status:      Spouse name: N/A   ? Number of children: N/A   ? Years of education: N/A     Occupational History   ? Not on file.     Social History Main Topics   ? Smoking status: Former Smoker     Types: Cigarettes      Quit date: 05/2016   ? Smokeless tobacco: Former User     Types: Snuff   ? Alcohol use Yes      Comment: last drink 2 weeks ago   ? Drug use: No   ? Sexual activity: Not on file     Other Topics Concern   ? Not on file     Social History Narrative    Works at [employer].  and lives with wife.     Has grandchildren and wife babysits them.        Eun Castro MD  4/5/2018        No medical insurance yet and this has been a barrier in getting adequate cares.  Unable to get a LifeVest as they have to pay out of pocket.    Followed by  at [facility].        Eun Castro MD  4/11/2018                          Medications  Allergies   Current Outpatient Prescriptions   Medication Sig Dispense Refill   ? acetaminophen (TYLENOL) 650 MG CR tablet Take 650 mg by mouth every 8 (eight) hours as needed for pain.      ? albuterol (PROAIR HFA;PROVENTIL HFA;VENTOLIN HFA) 90 mcg/actuation inhaler Inhale 2 puffs every 6 (six) hours as needed for wheezing.     ? aspirin 81 mg chewable tablet Chew 1 tablet (81 mg total) daily.  0   ? atorvastatin (LIPITOR) 80 MG tablet Take 1 tablet (80 mg total) by mouth daily. 30 tablet 11   ? carvedilol (COREG) 3.125 MG tablet Take 2 tablets (6.25 mg total) by mouth 2 (two) times a day. 120 tablet 11   ? furosemide (LASIX) 20 MG tablet Take 0.5 tablets (10 mg total) by mouth daily. 60 tablet 11   ? lisinopril (PRINIVIL,ZESTRIL) 5 MG tablet Take 1 tablet (5 mg total) by mouth daily. 30 tablet 11   ? ticagrelor (BRILINTA) 90 mg Tab Take 1 tablet (90 mg total) by mouth 2 (two) times a day. 60 tablet 11   ? lisinopril (PRINIVIL,ZESTRIL) 2.5 MG tablet Take 1 tablet (2.5 mg total) by mouth daily. Take 2.5 mg + 5 mg of Lisinopril (total 7.5 mg) daily 30 tablet 11     No current facility-administered medications for this visit.       Allergies   Allergen Reactions   ? No Known Drug Allergies          Lab Results    Chemistry CBC BNP   Lab Results   Component Value Date     CREATININE 1.13 07/06/2018    BUN 30 (H) 07/06/2018     07/06/2018    K 4.4 07/06/2018     07/06/2018    CO2 21 (L) 07/06/2018     Creatinine (mg/dL)   Date Value   07/06/2018 1.13   06/05/2018 0.91   04/07/2018 0.95   04/06/2018 0.87    Lab Results   Component Value Date    WBC 12.0 (H) 07/06/2018    HGB 12.0 (L) 07/06/2018    HCT 36.4 (L) 07/06/2018    MCV 84 07/06/2018     07/06/2018    Lab Results   Component Value Date    BNP 1000 (H) 04/07/2018     BNP (pg/mL)   Date Value   04/07/2018 1000 (H)   04/05/2018 1218 (H)        30  minutes were spent with the patient with greater than 50% spent on education and counseling.    Eliazar Do [facility]   Heart Failure Clinic           This note has been dictated using voice recognition software. Any grammatical, typographical, or context distortions are unintentional and inherent to the software

## 2021-06-26 NOTE — PROGRESS NOTES
Progress Notes by Azucena Wong RN at 7/17/2018 11:42 AM     Author: Azucena Wong RN Service: -- Author Type: Registered Nurse    Filed: 7/17/2018 11:49 AM Encounter Date: 7/17/2018 Status: Signed    : Azucena Wong RN (Registered Nurse)             To whom this may concern,    Daniele Becker 1965 does not meet criteria per American Dental Association/[place] [employer], to receive prophylactic antibiotics prior to dental cleaning and/or dental procedure at this time. Daniele Becker does not have a history of prosthetic valve, endocarditis, congenital heart disease, and/or a cardiac transplant. Daniele Becker has a single chamber ICD that was implanted on 7/6/18, which also does not meet criteria in receiving prophylactic antibiotics prior to dental cleaning. Daniele Becker can have routine dental cleaning, if patient is requiring invasive dental work or dental/oral surgery with the use of any tools using radiofrequency or that use high amounts of energy, tools should be held >6 inches away from implanted cardiac device. If you have any further questions regarding restrictions regarding specific dental tools that can be used, please contact the [employer] device [employer] directly at [employer] #[phone]  Please contact our office if you have further needs.    Thank You,    7/17/2018 11:43 AM  Dr Fernández/Azucena Wong RN  [phone]    This letter was faxed to Dr Baker on 7/17/18 at fax # [phone]      Allergies   Allergen Reactions   ? No Known Drug Allergies        Current Outpatient Prescriptions:   ?  acetaminophen (TYLENOL) 650 MG CR tablet, Take 650 mg by mouth every 8 (eight) hours as needed for pain. , Disp: , Rfl:   ?  albuterol (PROAIR HFA;PROVENTIL HFA;VENTOLIN HFA) 90 mcg/actuation inhaler, Inhale 2 puffs every 6 (six) hours as needed for wheezing., Disp: , Rfl:   ?  aspirin 81 mg chewable tablet, Chew 1 tablet (81 mg  "total) daily., Disp: , Rfl: 0  ?  atorvastatin (LIPITOR) 80 MG tablet, Take 1 tablet (80 mg total) by mouth daily., Disp: 30 tablet, Rfl: 11  ?  carvedilol (COREG) 3.125 MG tablet, Take 2 tablets (6.25 mg total) by mouth 2 (two) times a day., Disp: 120 tablet, Rfl: 11  ?  furosemide (LASIX) 20 MG tablet, Take 0.5 tablets (10 mg total) by mouth daily., Disp: 60 tablet, Rfl: 11  ?  lisinopril (PRINIVIL,ZESTRIL) 2.5 MG tablet, Take 1 tablet (2.5 mg total) by mouth daily. Take 2.5 mg + 5 mg of Lisinopril (total 7.5 mg) daily, Disp: 30 tablet, Rfl: 11  ?  lisinopril (PRINIVIL,ZESTRIL) 5 MG tablet, Take 1 tablet (5 mg total) by mouth daily., Disp: 30 tablet, Rfl: 11  ?  ticagrelor (BRILINTA) 90 mg Tab, Take 1 tablet (90 mg total) by mouth 2 (two) times a day., Disp: 60 tablet, Rfl: 11    Documentation Date:7/17/2018 11:48 AM  Azucena Wong RN    Teetee Reardon, RN  P [facility] Ep Rn Support Pool                     Pt is having oral and maxillofacial surgery on 8/17/2018 (needs to have some bone removed and some tooth root is growing up into his sinuses)   [facility]   Dr. Irineo RAMOS [facility]   Phone: [phone]   Fax [phone]     Patient and wife are requesting a letter per the Dentist. Letter needs to include:   -Date of device implant   -How surgery went?   -What is patient's current condition?   -List of current medications   -Also, letter should say, \"no antibiotics are needed per Dr. Fernández in regards to the device\" (I dicussed it with Dr. Fernández in clinic today and he told the patient and his wife this too)     Letter needs to be faxed to the above number.   Please contact Patient or Wife Riya on their mobile phones, once the letter has been completed and faxed.     ThankTeetee              "

## 2021-06-26 NOTE — PROGRESS NOTES
Progress Notes by Eliazar Do NP at 8/28/2018  8:30 AM     Author: Eliazar Do NP Service: -- Author Type: Nurse Practitioner    Filed: 8/28/2018  9:48 AM Encounter Date: 8/28/2018 Status: Signed    : Eliazar Do NP (Nurse Practitioner)           Click to link to [facility] Heart Care     [facility] HEART CARE NOTE      Assessment/Recommendations   Assessment:    1. Ischemic cardiomyopathy with systolic dysfunction, NYHA class II- EF of 28% in June 2018: Well compensated.  Denies heart failure symptoms. He did not tolerate the higher dose of lisinopril during last visit due to feeling fatigue and some shortness of breath. He is following low-salt diet and weight has been stable.    2. Hypotension: His blood pressure today is 94/60 and heart rate 72. He is asymptomatic.  His home recorded blood pressure has been systolic mostly 110s-120s with occasionally in 100s.    3. Status post single-chamber ICD on 7/6/2018: Left clavicle incision site intact with mild bruising but no hematoma except he experiences mild tender and irritation from seatbelt.-Dr. Fernández has provided a note for him to stay off of seat belt until healed.    Plan:  1.  We discussed about further up titration of his carvedilol which he agreed.  He was instructed to call if he experiences lightheaded, dizzy or increased shortness of breath.  Reinforced low-sodium diet, daily weight, and stay active as tolerated.      Heart failure medications:  - Beta blocker therapy with carvedilol increased to 9.375 mg in a.m. (6.25 mg +3.125 mg) and continue with 6.2 5:18 PM  - ACEI therapy with lisinopril 7.5 mg daily (5 mg +2.5 mg)  -  Diuretic therapy with furosemide 10 mg daily    2. Follow up in heart failure clinic with Eliazar in 8-10 weeks    3. Follow up with Dr. Israel in 4-6 weeks      History of Present Illness     Daniele Becker is a 52 y.o. years old  with a significant PMH of recent acute MI involving LAD,  of proximal LAD with  no viability on MRI,, dyslipidemia, anemia, former tobacco abuse, and ischemic cardiomyopathy with systolic dysfunction who is seen at [facility] for continued follow-up.  His most recent echocardiogram done on 4/5/2018 showed an EF of 28%. He underwent successful placement of single-chamber ICD on 7/6/2018. Patient had a device check today showed normal device function.  Patient has developed a mild hematoma on his left clavicle device site but is improving.  He had a device check today and  his incision site was also assessed by Dr. Fernández.  Patient was getting irritation on his left clavicle chest incision site from seatbelt and he got warning from the .  Dr. Fernández has provided note for him.    During last heart failure clinic visit, his lisinopril dose was increased from 5 mg to 7.5 mg which he tolerated.  However, he felt fatigue and short of breath when the dose was increased to 10 mg daily.  Therefore, the dose was reduced back to 7.5 mg daily.  Today, patient presented to the heart failure clinic accompanied by his wife.  He has been tolerating the current dose of lisinopril without issues.  His home blood pressure and heart rate has been stable.   He denies fatigue, lightheadedness, shortness of breath, dyspnea on exertion, orthopnea, PND, palpitations, chest pain, abdominal fullness/bloating and lower extremity edema.  He is not participating in cardiac rehab due to Time Constraint.    He works full-time.  His work involves a lot of physical exertion and heavy lifting but has been trying to avoid lifting more than 25 pounds.  He is hoping to cut back his hours of work from early 2019 to spend more time to care for his health and exercise on a regular basis.  He continues to follow heart healthy and low-sodium diet.He is monitoring home weights which are stable between  193-196 pounds.       Physical Examination Review of Systems   There were no vitals filed for this  visit.  There is no height or weight on file to calculate BMI.  Wt Readings from Last 3 Encounters:   07/17/18 200 lb (90.7 kg)   07/06/18 192 lb 9.6 oz (87.4 kg)   07/02/18 193 lb (87.5 kg)       General Appearance:     Alert, cooperative and in no acute distress.   ENT/Mouth: membranes moist, no oral lesions or bleeding gums.      EYES:  no scleral icterus, normal conjunctivae   Neck: no carotid bruits or thyromegaly   Chest/Lungs:   lungs are clear to auscultation, no rales or wheezing, respirations unlabored. Left clavicle incision site is intact except mild bruising.   Cardiovascular:   Normal first and second heart sounds (distant)  with no murmurs, rubs, or gallops; the carotid, radial and posterior tibial pulses are intact, Jugular venous pressure flat with no HJR, no edema bilateral lower extremities    Abdomen:  Soft, nontender, nondistended, bowel sounds present   Extremities: no cyanosis or clubbing   Skin: warm, dry.    Neurologic: mood and affect are appropriate, alert and oriented x3      General: WNL  Eyes: WNL  Ears/Nose/Throat: WNL  Lungs: WNL  Heart: WNL  Stomach: WNL  Bladder: WNL  Muscle/Joints: WNL  Skin: WNL  Nervous System: WNL  Mental Health: WNL     Blood: WNL     Medical History  Surgical History Family History Social History   Past Medical History:   Diagnosis Date   ? Chronic systolic heart failure (H) 5/23/2018   ? Coronary artery disease    ? Dyslipidemia    ? Hypertension    ? Ischemic cardiomyopathy 4/6/2018    LVEF 17% echo May 2018 (anterior, apical, inferior apical akinesis) Coronary angio May 5, 2018:  prox LAD, R >L collaterals attempted PCI (unsuccessful) Cardiac MRI April 20, 2018: LVEF 22% transmural MI (nonviable) apical, distal anterior/ anterolateral/ septal/ basal walls   ? Tobacco dependence in remission 4/5/2018    Past Surgical History:   Procedure Laterality Date   ? CV CORONARY ANGIOGRAM N/A 4/5/2018    Procedure: Coronary Angiogram;  Surgeon: Wilber Darby  MD;  Location: [facility] Cath Lab;  Service:    ? CV LEFT HEART CATHETERIZATION WO LEFT VETRICULOGRAM Left 2018    Procedure: Left Heart Catheterization Without Left Ventriculogram;  Surgeon: Wilber Darby MD;  Location: [facility] Cath Lab;  Service:    ? CYST REMOVAL      neck   ? EP ICD INSERT N/A 2018    Procedure: EP ICD Insert;  Surgeon: Hany Fernández MD;  Location: [facility] Cath Lab;  Service:    ? KNEE ARTHROSCOPY      Family History   Problem Relation Age of Onset   ? Anuerysm Father      ' at 76 from rupture of vessel'    Social History     Social History   ? Marital status:      Spouse name: N/A   ? Number of children: N/A   ? Years of education: N/A     Occupational History   ? Not on file.     Social History Main Topics   ? Smoking status: Former Smoker     Types: Cigarettes     Quit date: 2016   ? Smokeless tobacco: Former User     Types: Snuff   ? Alcohol use Yes      Comment: last drink 2 weeks ago   ? Drug use: No   ? Sexual activity: Not on file     Other Topics Concern   ? Not on file     Social History Narrative    Works at [employer].  and lives with wife.     Has grandchildren and wife babysits them.        Eun Castro MD  2018        No medical insurance yet and this has been a barrier in getting adequate cares.  Unable to get a LifeVest as they have to pay out of pocket.    Followed by  at [facility].        Eun Castro MD  2018                          Medications  Allergies   Current Outpatient Prescriptions   Medication Sig Dispense Refill   ? acetaminophen (TYLENOL) 650 MG CR tablet Take 650 mg by mouth every 8 (eight) hours as needed for pain.      ? albuterol (PROAIR HFA;PROVENTIL HFA;VENTOLIN HFA) 90 mcg/actuation inhaler Inhale 2 puffs every 6 (six) hours as needed for wheezing.     ? aspirin 81 mg chewable tablet Chew 1 tablet (81 mg total) daily.  0   ? atorvastatin (LIPITOR) 80 MG tablet Take 1 tablet  (80 mg total) by mouth daily. 30 tablet 11   ? carvedilol (COREG) 3.125 MG tablet Take 2 tablets (6.25 mg total) by mouth 2 (two) times a day. 120 tablet 11   ? furosemide (LASIX) 20 MG tablet Take 0.5 tablets (10 mg total) by mouth daily. 60 tablet 11   ? lisinopril (PRINIVIL,ZESTRIL) 2.5 MG tablet Take 1 tablet (2.5 mg total) by mouth daily. Take 2.5 mg + 5 mg of Lisinopril (total 7.5 mg) daily 30 tablet 11   ? lisinopril (PRINIVIL,ZESTRIL) 5 MG tablet Take 1 tablet (5 mg total) by mouth daily. 30 tablet 11   ? ticagrelor (BRILINTA) 90 mg Tab Take 1 tablet (90 mg total) by mouth 2 (two) times a day. 60 tablet 11     No current facility-administered medications for this visit.       Allergies   Allergen Reactions   ? No Known Drug Allergies          Lab Results    Chemistry CBC BNP   Lab Results   Component Value Date    CREATININE 1.13 08/07/2018    BUN 28 (H) 08/07/2018     08/07/2018    K 4.7 08/07/2018     08/07/2018    CO2 23 08/07/2018     Creatinine (mg/dL)   Date Value   08/07/2018 1.13   07/06/2018 1.13   06/05/2018 0.91   04/07/2018 0.95    Lab Results   Component Value Date    WBC 12.0 (H) 07/06/2018    HGB 12.0 (L) 07/06/2018    HCT 36.4 (L) 07/06/2018    MCV 84 07/06/2018     07/06/2018    Lab Results   Component Value Date    BNP 1000 (H) 04/07/2018     BNP (pg/mL)   Date Value   04/07/2018 1000 (H)   04/05/2018 1218 (H)          Eliazar Do CNP  [facility]   Heart Failure Clinic           This note has been dictated using voice recognition software. Any grammatical, typographical, or context distortions are unintentional and inherent to the software

## 2021-06-26 NOTE — PROGRESS NOTES
Progress Notes by Eliazar Do NP at 11/13/2018  7:50 AM     Author: Eliazar Do NP Service: -- Author Type: Nurse Practitioner    Filed: 11/13/2018  9:21 AM Encounter Date: 11/13/2018 Status: Signed    : Eliazar Do NP (Nurse Practitioner)           Click to link to [facility] Heart [facility] HEART [facility] NOTE      Assessment/Recommendations   Assessment:    1. Ischemic cardiomyopathy with systolic dysfunction, NYHA class II with EF of 28 % s/p ICD: well compensated. No heart failure signs and symptoms. Weight stable with following low salt diet.  He has been tolerating the previous dose of carvedilol. Some irritation on left clavicle incision site but no signs of infection or hematoma.    2.H/o hypotension: Blood pressure today is 110/72 and heart rate 78.  His home recorded blood pressures been running mostly in systolic 102-125 and diastolic in 60s-80s.  Heart rate has been mostly running in 70s-80s.        Plan:  1.  We discussed about  further up titration of his beta-blocker.  Patient was encouraged to call if experiences lightheadedness or dizziness on a higher dose of carvedilol.   Heart failure medications:  - Beta blocker therapy with carvedilol increased from  9.375 in a.m. and 6.25 in p.m. to 12.5 mg twice a day  - ACEI therapy with lisinopril 7.5 mg daily  - Diuretic therapy with furosemide 10 mg - taking every other day    2.  Reinforced low-sodium diet, daily weight, and regular physical exercise as tolerated    3. Follow up with Eliazar in 8-10 weeks and Dr. Israel in April 2018 as recommended.     History of Present Illness    Daniele Becker is a 52 y.o. years old with a significant PMH of recent acute MI involving LAD,  of proximal LAD with no viability on MRI,, dyslipidemia, anemia, former tobacco abuse, and ischemic cardiomyopathy with systolic dysfunction who is seen at [facility] for continued follow-up.  His most recent echocardiogram done on 4/5/2018  showed an EF of 28%. He underwent successful placement of single-chamber ICD on 7/6/2018.    During last heart failure clinic visit his carvedilol dose was increased and has been tolerating well.  Today patient comes in with his wife.  He denies fatigue, lightheadedness, shortness of breath, dyspnea on exertion, orthopnea, PND, palpitations, chest pain, abdominal fullness/bloating and lower extremity edema.  He continues to have some irritation on his left clavicle incision site and has been using extra soft pad to protect the area from seatbelt from causing irritation. He saw Dr. Israel in early October 2018 and recommended to [facility]  He is planning to meet with our research team after his HF visit today.  He has also recommended to have repeat echo in early March or April and follow-up with him.  He is monitoring home weights which are stable between 194-196 pounds.  He is following a low sodium diet.  He participates in regular physical activity including .  He recently changed his job but still working full-time that involves a lot of physical exertion and has been tolerating well.     Physical Examination Review of Systems   Vitals:    11/13/18 0802   BP: 110/72   Pulse: 78   Resp: 16     Body mass index is 31.63 kg/m .  Wt Readings from Last 3 Encounters:   11/13/18 205 lb (93 kg)   10/02/18 202 lb 9.6 oz (91.9 kg)   08/28/18 201 lb (91.2 kg)       General Appearance:     Alert, cooperative and in no acute distress.   ENT/Mouth: membranes moist, no oral lesions or bleeding gums.      EYES:  no scleral icterus, normal conjunctivae   Neck: no carotid bruits or thyromegaly   Chest/Lungs:   lungs are clear to auscultation, no rales or wheezing, respirations unlabored-left clavicle incision site intact   Cardiovascular:    Normal first and second heart sounds with no murmurs, rubs, or gallops; the carotid, radial and posterior tibial pulses are intact, Jugular venous  pressure  Flat with no HJR, no  edema bilateral lower extremities    Abdomen:  Soft, nontender, nondistended, bowel sounds present   Extremities: no cyanosis or clubbing   Skin: warm, dry.    Neurologic: mood and affect are appropriate, alert and oriented x3      General: WNL  Eyes: WNL  Ears/Nose/Throat: WNL  Lungs: WNL  Heart: WNL  Stomach: WNL  Bladder: WNL  Muscle/Joints: WNL  Skin: WNL  Nervous System: WNL  Mental Health: WNL     Blood: WNL     Medical History  Surgical History Family History Social History   Past Medical History:   Diagnosis Date   ? Chronic systolic heart failure (H) 2018   ? Coronary artery disease    ? Dyslipidemia    ? Hypertension    ? Ischemic cardiomyopathy 2018    LVEF 17% echo May 2018 (anterior, apical, inferior apical akinesis) Coronary angio May 5, 2018:  prox LAD, R >L collaterals attempted PCI (unsuccessful) Cardiac MRI 2018: LVEF 22% transmural MI (nonviable) apical, distal anterior/ anterolateral/ septal/ basal walls   ? Tobacco dependence in remission 2018    Past Surgical History:   Procedure Laterality Date   ? CV CORONARY ANGIOGRAM N/A 2018    Procedure: Coronary Angiogram;  Surgeon: Wilber Darby MD;  Location: [facility] Cath Lab;  Service:    ? CV LEFT HEART CATHETERIZATION WO LEFT VETRICULOGRAM Left 2018    Procedure: Left Heart Catheterization Without Left Ventriculogram;  Surgeon: Wilber Darby MD;  Location: [facility] Cath Lab;  Service:    ? CYST REMOVAL      neck   ? EP ICD INSERT N/A 2018    Procedure: EP ICD Insert;  Surgeon: Hany Fernández MD;  Location: [facility] Cath Lab;  Service:    ? KNEE ARTHROSCOPY      Family History   Problem Relation Age of Onset   ? Anuerysm Father         ' at 76 from rupture of vessel'    Social History     Socioeconomic History   ? Marital status:      Spouse name: Not on file   ? Number of children: Not on file   ? Years of education: Not on file   ? Highest  education level: Not on file   Social Needs   ? Financial resource strain: Not on file   ? Food insecurity - worry: Not on file   ? Food insecurity - inability: Not on file   ? Transportation needs - medical: Not on file   ? Transportation needs - non-medical: Not on file   Occupational History   ? Not on file   Tobacco Use   ? Smoking status: Former Smoker     Types: Cigarettes     Last attempt to quit: 2016     Years since quittin.5   ? Smokeless tobacco: Former User     Types: Snuff   Substance and Sexual Activity   ? Alcohol use: Yes     Comment: last drink 2 weeks ago   ? Drug use: No   ? Sexual activity: Not on file   Other Topics Concern   ? Not on file   Social History Narrative    Works at [employer].  and lives with wife.     Has grandchildren and wife babysits them.        Eun Castro MD  2018        No medical insurance yet and this has been a barrier in getting adequate cares.  Unable to get a LifeVest as they have to pay out of pocket.    Followed by  at [facility].        Eun Castro MD  2018                      Medications  Allergies   Current Outpatient Medications   Medication Sig Dispense Refill   ? acetaminophen (TYLENOL) 650 MG CR tablet Take 650 mg by mouth every 8 (eight) hours as needed for pain.      ? albuterol (PROAIR HFA;PROVENTIL HFA;VENTOLIN HFA) 90 mcg/actuation inhaler Inhale 2 puffs every 6 (six) hours as needed for wheezing.     ? aspirin 81 mg chewable tablet Chew 1 tablet (81 mg total) daily.  0   ? atorvastatin (LIPITOR) 80 MG tablet Take 1 tablet (80 mg total) by mouth daily. 30 tablet 11   ? carvedilol (COREG) 3.125 MG tablet Take 1 tablet (3.125 mg total) by mouth 2 (two) times a day. 3.125 mg + 6.25 mg in AM and 6.25 mg in PM (Patient taking differently: Take 3.125 mg by mouth daily. 3.125 mg + 6.25 mg in AM and 6.25 mg in PM) 30 tablet 11   ? carvedilol (COREG) 6.25 MG tablet Take 1 tablet (6.25 mg total) by mouth 2 (two)  times a day with meals. 6.25 mg +3.125 mg in AM and 6.25 mg in PM 60 tablet 11   ? furosemide (LASIX) 20 MG tablet Take 0.5 tablets (10 mg total) by mouth daily. (Patient taking differently: Take 5 mg by mouth every other day .      ) 60 tablet 11   ? lisinopril (PRINIVIL,ZESTRIL) 2.5 MG tablet Take 1 tablet (2.5 mg total) by mouth daily. Take 2.5 mg + 5 mg of Lisinopril (total 7.5 mg) daily 30 tablet 11   ? lisinopril (PRINIVIL,ZESTRIL) 5 MG tablet Take 1 tablet (5 mg total) by mouth daily. 30 tablet 11   ? ticagrelor (BRILINTA) 90 mg Tab Take 1 tablet (90 mg total) by mouth 2 (two) times a day. 60 tablet 11     No current facility-administered medications for this visit.       Allergies   Allergen Reactions   ? No Known Drug Allergies          Lab Results    Chemistry CBC BNP   Lab Results   Component Value Date    CREATININE 1.13 08/07/2018    BUN 28 (H) 08/07/2018     08/07/2018    K 4.7 08/07/2018     08/07/2018    CO2 23 08/07/2018     Creatinine (mg/dL)   Date Value   08/07/2018 1.13   07/06/2018 1.13   06/05/2018 0.91   04/07/2018 0.95    Lab Results   Component Value Date    WBC 12.0 (H) 07/06/2018    HGB 12.0 (L) 07/06/2018    HCT 36.4 (L) 07/06/2018    MCV 84 07/06/2018     07/06/2018    Lab Results   Component Value Date    BNP 1,000 (H) 04/07/2018     BNP (pg/mL)   Date Value   04/07/2018 1,000 (H)   04/05/2018 1,218 (H)        Eliazar Do CNP  [facility]   Heart Failure Clinic           This note has been dictated using voice recognition software. Any grammatical, typographical, or context distortions are unintentional and inherent to the software

## 2021-06-26 NOTE — PROGRESS NOTES
Progress Notes by Eliazar Do NP at 6/5/2018  7:50 AM     Author: Eliazar Do NP Service: -- Author Type: Nurse Practitioner    Filed: 6/5/2018 10:56 AM Encounter Date: 6/5/2018 Status: Signed    : Eliazar Do NP (Nurse Practitioner)           Click to link to [facility] Heart Care     [facility] HEART CARE NOTE      Assessment/Recommendations   Assessment:    1. Ischemic cardiomyopathy with systolic dysfunction, NYHA class II with an EF of 21.7% on recent MRI 4/2018: Well compensated. Denies any symptoms.Following low salt diet and monitoring weight. Home recorded SBP in 100's-110's and HR 70's-80's. Saw Dr. Vu recently and also had Holter study-recommended prophylactic S-ICD.     2. CAD with recent STEMI and single vessel disease with  of LAD but unsuccessful PCI: Patient is on Ticagrelor 90 mg two times a day and ASA 81 mg daily. Reports two episodes of nose bleed - took couple hours to stop. Reports h/o issue with nose bleed in the past.     3. BP today is 96/68 and HR 84. Asymptomatic. HR has improved. Was in 40's during last clinic visit.    Plan:  1.   Heart failure medications:  - Beta blocker therapy with carvedilol 6.25 mg twice a day  - ACEI therapy with lisinopril 2.5 mg daily. Will consider increasing the dose if BMP stable  -  Last BMP stable on 4/17/18.  - Diuretic therapy with furosemide 10 mg daily.  - Expecting phone call from Dr. Pradeep Zeng's RN to schedule appointment for S-ICD placement.    2. Obtaining platelet level today-pending. Will discuss with Dr. Israel if we need to switch him to different antiplatelet given bleeding risk although patient and his wife are hesitant to switch to different antiplatelet.    Patient recently went to Urgent Care with some sinus problem. He is unhappy with the care received. He continues to have issue with sinus problem (h/o sinus infection in the past most recent 2 yrs ago). He has also noticed lump on his right upper gum and  having pain in upper teeth and gum area. Recommended to f/u with PCP and dentist. Wife is trying to set up appointment. Denies fever or chills. Patient and wife frustrated about the not able to get appointment with PCP and difficulty finding dentist covered by their insurance. Recommended to contact insurance company directly to get the contact information of dentist who are covered in their network.    F/u with Shweta in 6 weeks and Dr. Conklin in June as scheduled     History of Present Illness     Daniele Becker is a 52 y.o. years old  with a significant PMH of recent acute MI involving LAD, dyslipidemia, anemia, former tobacco abuse, and ischemic cardiomyopathy with systolic dysfunction who is seen at [facility] for post coronary intervention follow up.  Recently admitted on 4/5/2018 with STEMI. Coronary angiogram showed single-vessel disease with  of LAD but unsuccessful PCI.   Patient currently does not have a insurance and paying out of pocket. His most recent echocardiogram done on 4/5/2018 showed an EF of 17%.  He was recommended to wear LifeVest unfortunately he does not have insurance and therefore declined to wear LifeVest.  He had a cardiac MRI on 4/20/2018 showed an EF of 21.7% with nonviable myocardium. He saw Dr. Vu recently to consult on prophylactic ICD. Dr. Rocha ordered Holter study-showed singular PVC's with single morphology with low level complexity and patient was asymptomatic-he recommended S-ICD.     During the last clinic visit, he was noted to have HR in 40's. Today his HR is in 80's. He was started on Lisinopril and has been tolerating. He denies fatigue, lightheadedness, shortness of breath, dyspnea on exertion, orthopnea, PND, palpitations, chest pain, abdominal fullness/bloating and lower extremity edema. He reported having 2 episodes of nose bleed since started on Ticagrelor which took about 2 hours to stop the bleeding. Patient and wife also reports that patient  does have h/o nose bleed as a child.     He is monitoring home weights which are stable between 189-190 pounds.  He is following a low sodium diet.  He continues to work with his son and staying active-tolerating without issues. He is following weight lift restriction and avoiding heavy exertion d/t low EF.     Patient recently visited Urgent Care d/t sinus problem. He is unhappy with the treatment recommended. He continues to have sinus issue. Reports h/o sinus infection in the past (recently 2 yrs ago). He also reports having lump on his right upper gum and pain in the right upper teeth and gum area. Denies fever or chills. They just got their insurance yesterday. Patient and wife both are frustrated about the not able to get appointment with PCP and not able to find dentis in their network for insurance coverage.     Cardiac MR on 4/20/18  IMPRESSIONS:   1. Left ventricle is moderately enlarged by volume with severe systolic dysfunction. The quantified left ventricular ejection fraction is 21.7%.   2 Akinesis of the mid to apical anterior, basal to apical septal, mid to distal anterolateral and entire apex of the left ventricle.  3. Transmural infarction of the mid to apical anterior, basal to apical septal, mid to distal anterolateral and entire apex with areas of MVO (microvascular obstruction) in the left ventricle representing nonviable myocardium.  4. Right ventricle is normal size with borderline right ventricular systolic function.  5. Severe left atrial enlargement.   6. No significant valvular abnormalities       Physical Examination Review of Systems   Vitals:    06/05/18 0810   BP: 96/68   Pulse: 84   Resp: 16     Body mass index is 30.23 kg/(m^2).  Wt Readings from Last 3 Encounters:   06/05/18 193 lb (87.5 kg)   05/31/18 190 lb 3.2 oz (86.3 kg)   05/23/18 197 lb (89.4 kg)       General Appearance:     Alert, cooperative and in no acute distress.   ENT/Mouth: membranes moist, no oral lesions or  bleeding gums.      EYES:  no scleral icterus, normal conjunctivae   Neck: no carotid bruits or thyromegaly   Chest/Lungs:   lungs are clear to auscultation, no rales or wheezing, respirations unlabored- except mild crackle in bilateral lower lobes   Cardiovascular:   Heart rate regular. Normal first and second heart sounds with no murmurs, rubs, or gallops; the carotid, radial and posterior tibial pulses are intact, Jugular venous pressure flat with no  HJR, no edema bilateral lower extremities    Abdomen:  Soft, nontender, nondistended, bowel sounds present   Extremities: no cyanosis or clubbing   Skin: warm, dry.    Neurologic: mood and affect are appropriate, alert and oriented x3      General: Fever  Eyes: Visual Distubance  Ears/Nose/Throat: Nosebleeds  Lungs: WNL  Heart: WNL  Stomach: WNL  Bladder: WNL  Muscle/Joints: WNL  Skin: WNL  Nervous System: WNL  Mental Health: WNL     Blood: WNL     Medical History  Surgical History Family History Social History   Past Medical History:   Diagnosis Date   ? Chronic systolic heart failure 5/23/2018   ? Coronary artery disease    ? Dyslipidemia    ? Hypertension    ? Ischemic cardiomyopathy 4/6/2018    LVEF 17% echo May 2018 (anterior, apical, inferior apical akinesis) Coronary angio May 5, 2018:  prox LAD, R >L collaterals attempted PCI (unsuccessful) Cardiac MRI April 20, 2018: LVEF 22% transmural MI (nonviable) apical, distal anterior/ anterolateral/ septal/ basal walls   ? Tobacco dependence in remission 4/5/2018    Past Surgical History:   Procedure Laterality Date   ? CV CORONARY ANGIOGRAM N/A 4/5/2018    Procedure: Coronary Angiogram;  Surgeon: Wilber Darby MD;  Location: [facility] Cath Lab;  Service:    ? CV LEFT HEART CATHETERIZATION WO LEFT VETRICULOGRAM Left 4/5/2018    Procedure: Left Heart Catheterization Without Left Ventriculogram;  Surgeon: Wilber Darby MD;  Location: [facility] Cath Lab;  Service:     Family History   Problem Relation  Age of Onset   ? Huber Father      ' at 76 from rupture of vessel'    Social History     Social History   ? Marital status:      Spouse name: N/A   ? Number of children: N/A   ? Years of education: N/A     Occupational History   ? Not on file.     Social History Main Topics   ? Smoking status: Former Smoker     Types: Cigarettes     Quit date: 2016   ? Smokeless tobacco: Former User     Types: Snuff   ? Alcohol use Yes      Comment: last drink 2 weeks ago   ? Drug use: No   ? Sexual activity: Not on file     Other Topics Concern   ? Not on file     Social History Narrative    Works at [employer].  and lives with wife.     Has grandchildren and wife babysits them.        Eun Castro MD  2018        No medical insurance yet and this has been a barrier in getting adequate cares.  Unable to get a LifeVest as they have to pay out of pocket.    Followed by  at [facility].        Eun Castro MD  2018                          Medications  Allergies   Current Outpatient Prescriptions   Medication Sig Dispense Refill   ? acetaminophen (TYLENOL) 650 MG CR tablet Take 650 mg by mouth 5 (five) times a day.      ? albuterol (PROAIR HFA;PROVENTIL HFA;VENTOLIN HFA) 90 mcg/actuation inhaler Inhale 2 puffs every 6 (six) hours as needed for wheezing.     ? aspirin 81 mg chewable tablet Chew 1 tablet (81 mg total) daily.  0   ? atorvastatin (LIPITOR) 80 MG tablet Take 1 tablet (80 mg total) by mouth daily. 30 tablet 11   ? carvedilol (COREG) 3.125 MG tablet Take 2 tablets (6.25 mg total) by mouth 2 (two) times a day. 120 tablet 11   ? furosemide (LASIX) 20 MG tablet Take 0.5 tablets (10 mg total) by mouth daily. 60 tablet 11   ? lisinopril (PRINIVIL,ZESTRIL) 2.5 MG tablet Take 1 tablet (2.5 mg total) by mouth daily. 30 tablet 0   ? ticagrelor (BRILINTA) 90 mg Tab Take 1 tablet (90 mg total) by mouth 2 (two) times a day. 60 tablet 11     No current facility-administered  medications for this visit.       Allergies   Allergen Reactions   ? No Known Drug Allergies          Lab Results    Chemistry CBC BNP   Lab Results   Component Value Date    CREATININE 0.95 04/07/2018    BUN 18 04/07/2018     04/07/2018    K 4.1 04/07/2018     04/07/2018    CO2 24 04/07/2018     Creatinine (mg/dL)   Date Value   04/07/2018 0.95   04/06/2018 0.87   04/05/2018 0.81   04/05/2018 0.89    Lab Results   Component Value Date    WBC 10.9 04/07/2018    HGB 13.1 (L) 04/07/2018    HCT 40.1 04/07/2018    MCV 88 04/07/2018     (H) 04/07/2018    Lab Results   Component Value Date    BNP 1000 (H) 04/07/2018     BNP (pg/mL)   Date Value   04/07/2018 1000 (H)   04/05/2018 1218 (H)        35  minutes were spent with the patient with greater than 50% spent on education and counseling.    Eliazar Do [facility]   Heart Failure Clinic           This note has been dictated using voice recognition software. Any grammatical, typographical, or context distortions are unintentional and inherent to the software

## 2021-06-26 NOTE — PROGRESS NOTES
Progress Notes by Eliazar Do NP at 5/15/2018  7:50 AM     Author: Eliazar Do NP Service: -- Author Type: Nurse Practitioner    Filed: 5/15/2018 10:47 AM Encounter Date: 5/15/2018 Status: Addendum    : Eliazar Do NP (Nurse Practitioner)    Related Notes: Original Note by Eliazar Do NP (Nurse Practitioner) filed at 5/15/2018 10:41 AM           Click to link to [facility] Heart Care     [facility] HEART CARE NOTE      Assessment/Recommendations   Assessment:    1. Ischemic cardiomyopathy with systolic dysfunction, NYHA class II with an EF of 21.7% on most recent Cardiac MR on 4/20/18: Well compensated. Occasionally out of breath with heavy exertion. Following low sodium diet and monitoring weight - stable. BP stable at home in 110's systolic with HR 80's.      Today SBP in 90's in clinic with HR 40's. Taken Coreg in empty stomach this morning- asymptomatic. Reinforced patient and wife about EP consult for possible CRT-D given low EF. They are stilling waiting for their final confirmation from insurance. Patient continues to work with his son and has been tolerating well. He has been following weight lift restrictions to prevent sudden heavy exertion on heart.  The further discussed about the low ejection fraction with risk of sudden cardiac death.     Plan:  1.   Heart failure medications:  - Beta blocker therapy with carvedilol 6.25 mg BID.  Patient was recommended to take the carvedilol with food to prevent sudden drop of blood pressure and heart rate.  Call if heart rate stays persistently in the low 40s with lightheadedness and dizziness  - ACEI therapy with lisinopril 2.5  mg daily started today. Call if lightheaded, dizzy, or cough. Go to ED if swelling tongue, lips, or difficulty breathing  - BMP stable in April 2018.  - Diuretic therapy with furosemide 10 mg daily  -Patient's wife will schedule appointment with EP today and follow-up with insurance today.    Patient has been having  some heartburn recently and relieves with taking Tums.  He was recently started on antiplatelets for coronary artery disease.  Patient was recommended to follow-up with PCP.  He denies having shortness of breath or fatigue that he experienced with recent MI.  He has no chest pain or angina.     EP consult. F/u with Shweta in 2-3 weeks and Dr. Israel in June 2018 as scheduled.     History of Present Illness    Daniele Becker is a 52 y.o. years old  with a significant PMH of recent acute MI involving LAD, dyslipidemia, anemia, former tobacco abuse, and ischemic cardiomyopathy with systolic dysfunction who is seen at [facility] for post coronary intervention follow up.  Recently admitted on 4/5/2018 with STEMI. Coronary angiogram showed single-vessel disease with  of LAD but unsuccessful PCI.   Patient currently does not have a insurance and paying out of pocket. His most recent echocardiogram done on 4/5/2018 showed an EF of 17%.  He was recommended to wear LifeVest unfortunately he does not have insurance and therefore declined to wear LifeVest.  He had a cardiac MRI on 4/20/2018 showed an EF of 21.7% with nonviable myocardium.  He was recommended to have a EP consult for possible CRT-D and medical therapy.    During the last clinic visit, his carvedilol was increased to 6.25 mg twice a day.  He has been tolerating it well and his home recorded blood pressure and heart rates been stable.  He denies fatigue, lightheadedness, shortness of breath, orthopnea, PND, palpitations, chest pain, abdominal fullness/bloating and lower extremity edema  Except he gets out of breath with heavy exertion.  He has been following with lift restrictio due to low EF.  Today his clinic blood pressure systolic in 90s with heart rate 40s.  He took his carvedilol this morning in an empty stomach.  He denies having any symptoms of lightheaded or dizziness.    He is monitoring home weights which are stable between  190-191pounds.  He has been following heart healthy diet and controlling his portion.  He is following a low sodium diet.  He participates in regular physical activity including working with son that involves physical activity.    Cardiac MR on 4/20/18  IMPRESSIONS:   1. Left ventricle is moderately enlarged by volume with severe systolic dysfunction. The quantified left ventricular ejection fraction is 21.7%.   2 Akinesis of the mid to apical anterior, basal to apical septal, mid to distal anterolateral and entire apex of the left ventricle.  3. Transmural infarction of the mid to apical anterior, basal to apical septal, mid to distal anterolateral and entire apex with areas of MVO (microvascular obstruction) in the left ventricle representing nonviable myocardium.  4. Right ventricle is normal size with borderline right ventricular systolic function.  5. Severe left atrial enlargement.   6. No significant valvular abnormalities        Physical Examination Review of Systems   Vitals:    05/15/18 0806   BP: 90/60   Pulse: (!) 40   Resp: 16     Body mass index is 30.54 kg/(m^2).  Wt Readings from Last 3 Encounters:   05/15/18 195 lb (88.5 kg)   04/19/18 198 lb 9.6 oz (90.1 kg)   04/10/18 201 lb (91.2 kg)       General Appearance:     Alert, cooperative and in no acute distress.   ENT/Mouth: membranes moist, no oral lesions or bleeding gums.      EYES:  no scleral icterus, normal conjunctivae   Neck: no carotid bruits or thyromegaly   Chest/Lungs:   lungs are clear to auscultation, no rales or wheezing, respirations unlabored   Cardiovascular:   Heart rate regular but slow in 40's. Normal first and second heart sounds with no murmurs, rubs, or gallops; the carotid, radial and posterior tibial pulses are intact, Jugular venous pressure flat and no HJR, no edema bilateral lower extremities    Abdomen:  Soft, nontender, nondistended, bowel sounds present   Extremities: no cyanosis or clubbing   Skin: warm, dry.     Neurologic: mood and affect are appropriate, alert and oriented x3      General: WNL  Eyes: WNL  Ears/Nose/Throat: WNL  Lungs: WNL  Heart: WNL  Stomach: Heartburn  Bladder: WNL  Muscle/Joints: WNL  Skin: WNL  Nervous System: WNL  Mental Health: WNL     Blood: WNL     Medical History  Surgical History Family History Social History   Past Medical History:   Diagnosis Date   ? Coronary artery disease    ? Dyslipidemia    ? Hypertension    ? Tobacco dependence in remission 2018    Past Surgical History:   Procedure Laterality Date   ? CV CORONARY ANGIOGRAM N/A 2018    Procedure: Coronary Angiogram;  Surgeon: Wilber Darby MD;  Location: [facility] Cath Lab;  Service:    ? CV LEFT HEART CATHETERIZATION WO LEFT VETRICULOGRAM Left 2018    Procedure: Left Heart Catheterization Without Left Ventriculogram;  Surgeon: Wilber Darby MD;  Location: [facility] Cath Lab;  Service:     Family History   Problem Relation Age of Onset   ? Anuerysm Father      ' at 76 from rupture of vessel'    Social History     Social History   ? Marital status:      Spouse name: N/A   ? Number of children: N/A   ? Years of education: N/A     Occupational History   ? Not on file.     Social History Main Topics   ? Smoking status: Former Smoker     Types: Cigarettes     Quit date: 2016   ? Smokeless tobacco: Former User     Types: Snuff   ? Alcohol use Yes      Comment: last drink 2 weeks ago   ? Drug use: No   ? Sexual activity: Not on file     Other Topics Concern   ? Not on file     Social History Narrative    Works at [employer].  and lives with wife.     Has grandchildren and wife babysits them.        Eun Castro MD  2018        No medical insurance yet and this has been a barrier in getting adequate cares.  Unable to get a LifeVest as they have to pay out of pocket.    Followed by  at [facility].        Eun Castro MD  2018                          Medications   Allergies   Current Outpatient Prescriptions   Medication Sig Dispense Refill   ? albuterol (PROAIR HFA;PROVENTIL HFA;VENTOLIN HFA) 90 mcg/actuation inhaler Inhale 2 puffs every 6 (six) hours as needed for wheezing.     ? aspirin 81 mg chewable tablet Chew 1 tablet (81 mg total) daily.  0   ? atorvastatin (LIPITOR) 80 MG tablet Take 1 tablet (80 mg total) by mouth daily. 30 tablet 11   ? carvedilol (COREG) 3.125 MG tablet Take 2 tablets (6.25 mg total) by mouth 2 (two) times a day. 120 tablet 11   ? furosemide (LASIX) 20 MG tablet Take 0.5 tablets (10 mg total) by mouth daily. 60 tablet 11   ? ticagrelor (BRILINTA) 90 mg Tab Take 1 tablet (90 mg total) by mouth 2 (two) times a day. 60 tablet 11   ? lisinopril (PRINIVIL,ZESTRIL) 2.5 MG tablet Take 1 tablet (2.5 mg total) by mouth daily. 30 tablet 0     No current facility-administered medications for this visit.       Allergies   Allergen Reactions   ? No Known Drug Allergies          Lab Results    Chemistry CBC BNP   Lab Results   Component Value Date    CREATININE 0.95 04/07/2018    BUN 18 04/07/2018     04/07/2018    K 4.1 04/07/2018     04/07/2018    CO2 24 04/07/2018     Creatinine (mg/dL)   Date Value   04/07/2018 0.95   04/06/2018 0.87   04/05/2018 0.81   04/05/2018 0.89    Lab Results   Component Value Date    WBC 10.9 04/07/2018    HGB 13.1 (L) 04/07/2018    HCT 40.1 04/07/2018    MCV 88 04/07/2018     (H) 04/07/2018    Lab Results   Component Value Date    BNP 1000 (H) 04/07/2018     BNP (pg/mL)   Date Value   04/07/2018 1000 (H)   04/05/2018 1218 (H)        30 minutes were spent with the patient with greater than 50% spent on education and counseling.    El[facility]ar Do [facility]   [facility] Clinic           This note has been dictated using voice recognition software. Any grammatical, typographical, or context distortions are unintentional and inherent to the software

## 2021-06-27 NOTE — PROGRESS NOTES
Progress Notes by Ani Turner RN at 1/29/2019  7:30 AM     Author: Ani Turner RN Service: -- Author Type: Registered Nurse    Filed: 1/29/2019  8:31 AM Encounter Date: 1/29/2019 Status: Signed    : Ani Turner RN (Registered Nurse)       [id number]  Day 16    Vitals:    01/29/19 0739 01/29/19 0740 01/29/19 0741   BP: 94/62 98/64 104/65   Patient Site: Right Arm Right Arm Right Arm   Patient Position: Lying Lying Lying   Cuff Size: Adult Regular Adult Regular Adult Regular   Pulse: 66 (!) 59 65   Resp: 16     Temp: 97.1  F (36.2  C)     SpO2: 98%     Weight: 207 lb 4.8 oz (94 kg)       Labs drawn per protocol    PE  (brief) done by Pamela Chang, FNP    ECGs done per protocol-see tracings for times.   Subject supine x 10 prior to collecting ECG    Device check done, results in chart.     Adverse events:     28 January 2019 subject experienced broken hand (right) at work. Hand is swollen with immobiliy of 5th digit. No treatment by patient or outside physician, did not seek medical assistance for swelling/break.     Plan: Daniele Becker has now completed his participation in the [id number] study.  Thank you for the gift of participation    Ani Turner RN      Current Outpatient Medications:   ?  acetaminophen (TYLENOL) 650 MG CR tablet, Take 650 mg by mouth every 8 (eight) hours as needed for pain.    , Disp: , Rfl:   ?  aspirin 81 mg chewable tablet, Chew 1 tablet (81 mg total) daily., Disp: , Rfl: 0  ?  atorvastatin (LIPITOR) 80 MG tablet, Take 1 tablet (80 mg total) by mouth daily., Disp: 30 tablet, Rfl: 11  ?  carvedilol (COREG) 12.5 MG tablet, Take 1 tablet (12.5 mg total) by mouth 2 (two) times a day with meals Take 12.5 mg +3.125 mg =15.625., Disp: 60 tablet, Rfl: 11  ?  carvedilol (COREG) 3.125 MG tablet, Take 1 tablet (3.125 mg total) by mouth 2 (two) times a day with meals Take 12.5 mg +3.125 mg =15.625 two times a day., Disp: 60 tablet, Rfl: 1  ?  furosemide (LASIX) 20 MG tablet,  Take 0.5 tablets (10 mg total) by mouth as needed (For fluid retention, sob)., Disp: 30 tablet, Rfl: 1  ?  lisinopril (PRINIVIL,ZESTRIL) 2.5 MG tablet, Take 1 tablet (2.5 mg total) by mouth daily. Take 2.5 mg + 5 mg of Lisinopril (total 7.5 mg) daily, Disp: 30 tablet, Rfl: 11  ?  lisinopril (PRINIVIL,ZESTRIL) 5 MG tablet, Take 1 tablet (5 mg total) by mouth daily Take 5 mg +2.5 mg (7.5 mg daily)., Disp: 30 tablet, Rfl: 11  ?  ticagrelor (BRILINTA) 90 mg Tab, Take 1 tablet (90 mg total) by mouth 2 (two) times a day., Disp: 60 tablet, Rfl: 11

## 2021-06-27 NOTE — PROGRESS NOTES
Progress Notes by Pmaela Chang CNP at 1/29/2019  7:50 AM     Author: Pamela Chang CNP Service: -- Author Type: Nurse Practitioner    Filed: 1/29/2019  8:33 AM Encounter Date: 1/29/2019 Status: Signed    : Pamela Chang CNP (Nurse Practitioner)       Physical Examination (brief)      [] Pulmonary system   [x] Normal    [] Abnormal, not clinically significant   [] Abnormal, clinically significant    Describe   [] Abdomen   [x] Normal    [] Abnormal, not clinically significant   [] Abnormal, clinically significant    Describe   [] Cardiovascular   [x] Normal    [] Abnormal, not clinically significant   [] Abnormal, clinically significant    Describe   [] Other (related to any systems)   [] Normal    [x] Abnormal, not clinically significant   [] Abnormal, clinically significant    Describe    Patient was upset yesterday and punched a hard object with his right hand.  Right hand is swollen and painful.  He denies any decreased sensation.  He is able to move all fingers and wrist. Mild bruising but otherwise color normal. Strong radial pulse.  He was advised to be seen at primary clinic if any symptoms worsen.        Pamela Chang CNP

## 2021-06-27 NOTE — PROGRESS NOTES
Progress Notes by Margot Joyce PA-C at 12/26/2018  3:30 PM     Author: Margot Joyce PA-C Service: -- Author Type: Physician Assistant    Filed: 12/26/2018  3:38 PM Encounter Date: 12/26/2018 Status: Signed    : Margot Joyce PA-C (Physician Assistant)       Physical Examination    [x] General appearance    [x] Normal     [] Abnormal, not clinically significant    [] Abnormal, clinically significant     Describe   [x] Skin    [x] Normal     [] Abnormal, not clinically significant    [] Abnormal, clinically significant     Describe   [x] Head and Neck   [x] Normal    [] Abnormal, not clinically significant   [] Abnormal, clinically significant    Describe   [x] Mouth   [x] Normal    [] Abnormal, not clinically significant   [] Abnormal, clinically significant    Describe   [x] Lymph nodes   [x] Normal    [] Abnormal, not clinically significant   [] Abnormal, clinically significant    Describe   [x] Thyroid   [x] Normal    [] Abnormal, not clinically significant   [] Abnormal, clinically significant    Describe   [x] Respiratory system   [x] Normal    [] Abnormal, not clinically significant   [] Abnormal, clinically significant    Describe   [x] Abdomen   [x] Normal    [] Abnormal, not clinically significant   [] Abnormal, clinically significant    Describe   [x] Musculoskeletal   [x] Normal    [] Abnormal, not clinically significant   [] Abnormal, clinically significant    Describe   [x] Cardiovascular   [x] Normal    [] Abnormal, not clinically significant   [] Abnormal, clinically significant    Describe   [x] Neurological (gross motor and deep tendon reflexes)   [x] Normal    [] Abnormal, not clinically significant   [] Abnormal, clinically significant    Describe     Margot Joyce PA-C

## 2021-06-27 NOTE — PROGRESS NOTES
Progress Notes by Ani Turner RN at 12/26/2018 12:30 PM     Author: Ani Turner RN Service: -- Author Type: Registered Nurse    Filed: 12/26/2018  4:06 PM Encounter Date: 12/26/2018 Status: Addendum    : Ani Turner RN (Registered Nurse)    Related Notes: Original Note by Ani Turner RN (Registered Nurse) filed at 12/26/2018  3:24 PM       screening/baseline visit  [id number] is a randomized, double-blind, placebo controlled two=part, adaptive design study of safety, tolerability, preliminary pharmacokinetics, and pharmacodynamics of single and multiple ascending oral doses of TANI-491 is patient with stable heart failure with reduced ejection fraction  Mateusz Ganzer is seen in clinic today for the baseline visit.  White or   Ethnicity: [x] White []  Black or  []     []   or     []    or Other    []  Other   Past Medical History:   Diagnosis Date   ? Chronic systolic heart failure (H) 5/23/2018   ? Coronary artery disease 4/5/2018   ? Dyslipidemia 10/18/2010   ? Hypertension 10/18/2010   ? Ischemic cardiomyopathy 4/6/2018    LVEF 17% echo May 2018 (anterior, apical, inferior apical akinesis) Coronary angio May 5, 2018:  prox LAD, R >L collaterals attempted PCI (unsuccessful) Cardiac MRI April 20, 2018: LVEF 22% transmural MI (nonviable) apical, distal anterior/ anterolateral/ septal/ basal walls   ? Tobacco dependence in remission 4/5/2018       Current Outpatient Medications:   ?  acetaminophen (TYLENOL) 650 MG CR tablet, Take 650 mg by mouth every 8 (eight) hours as needed for pain. , Disp: , Rfl:   ?  aspirin 81 mg chewable tablet, Chew 1 tablet (81 mg total) daily., Disp: , Rfl: 0  ?  atorvastatin (LIPITOR) 80 MG tablet, Take 1 tablet (80 mg total) by mouth daily., Disp: 30 tablet, Rfl: 11  ?  carvedilol (COREG) 12.5 MG tablet, Take 1 tablet (12.5 mg total) by mouth 2 (two) times a day with meals  Take 12.5 mg +3.125 mg =15.625., Disp: 60 tablet, Rfl: 11  ?  carvedilol (COREG) 3.125 MG tablet, Take 1 tablet (3.125 mg total) by mouth 2 (two) times a day with meals Take 12.5 mg +3.125 mg =15.625 two times a day., Disp: 60 tablet, Rfl: 1  ?  furosemide (LASIX) 20 MG tablet, Take 0.5 tablets (10 mg total) by mouth daily. (Patient taking differently: Take 10 mg by mouth every other day .   ), Disp: 60 tablet, Rfl: 11  ?  lisinopril (PRINIVIL,ZESTRIL) 2.5 MG tablet, Take 1 tablet (2.5 mg total) by mouth daily. Take 2.5 mg + 5 mg of Lisinopril (total 7.5 mg) daily, Disp: 30 tablet, Rfl: 11  ?  lisinopril (PRINIVIL,ZESTRIL) 5 MG tablet, Take 1 tablet (5 mg total) by mouth daily Take 5 mg +2.5 mg (7.5 mg daily)., Disp: 30 tablet, Rfl: 11  ?  ticagrelor (BRILINTA) 90 mg Tab, Take 1 tablet (90 mg total) by mouth 2 (two) times a day., Disp: 60 tablet, Rfl: 11    Heart Failure history    HF etiology: [x] Ischemic []  Non-ischemic    Date of onset of HF 30 March 2018    Did the subject experience any of the following symptoms in the past 12 months?   [x] shortness of breath  If yes, Ongoing? []  Yes [x]  No    [x] Chest pain   If yes,Ongoing? []  Yes [x]  No     [x] Fatigue    If yes,Ongoing? []  Yes [x]  No    [] palpitations    If yes,Ongoing? []  Yes []  No        Did the subject experience of of the followings caridac rhytm events in the past?   [] Atrial fibrillation     If yes, Ongoing? []  Yes [x]  No    [] Non-sustained ventricular tachycardia  If yes, Ongoing? []  Yes [x]  No    [] sustained ventricular tachycardia  If yes, Ongoing? []  Yes [x]  No    [] Other     If yes, Ongoing? []  Yes [x]  No     Does the subject have a family history of sudden cardiac death less than age 50?   []  Yes  [x]  No      Was the subject hospitalized due to heart failure in the last year?     [x]  Yes   []  No  if yes: dates of hospitalization 05 April 2018    Reason for hospitalization: New onset HF and NSTEMI    Has the subject  "had any cardiac related surgeries or procedures?    [x]  Yes []  No      [] Coronary Artery Bypass Graft Surgery Date      [] Valve replacement    Date     [] Coronary stent or balloon   Date     [x] Implanted cardioverter defibrillator (ICD) Date 06 July 2018    [] RF ablation (not PVI)   Date     [] Other (including PVI, CRT-D/P)  Date     EKG x 3 was performed after resting in supine position x 10 minutes  Vitals x 3 were performed after greater than 5 minutes of rest.     Vitals:    12/26/18 1305 12/26/18 1307 12/26/18 1309   BP: 99/55 99/56 105/66   Patient Site: Right Arm Right Arm Right Arm   Patient Position: Lying Lying Lying   Cuff Size: Adult Regular Adult Regular Adult Regular   Pulse: (!) 58 (!) 58 62   Resp: 20 20 20   Temp: 98.1  F (36.7  C)     TempSrc: Oral     SpO2: 96% 98% 96%   Weight: 209 lb (94.8 kg)     Height: 5' 7\" (1.702 m)       Ani Turner RN                 "

## 2021-06-27 NOTE — PROGRESS NOTES
Progress Notes by Ani Turner RN at 1/25/2019 10:00 AM     Author: Ani Turner RN Service: -- Author Type: Registered Nurse    Filed: 1/25/2019 10:18 AM Encounter Date: 1/25/2019 Status: Signed    : Ani Turner RN (Registered Nurse)       TANI-[id number]  Day 12    Reviewed Day 12 study agenda with subject. Questions answered to his satisfaction.    No unaccustomed intensive exercise, grapefruit, grapefruit juice,[place] oranges, quinine, tonic water or changes to contraception since d/c.    Instructed to continue no unaccustomed intensive exercise, grapefruit, grapefruit juice,[place] oranges, quinine, tonic water or changes to contraception until after final visit next Tuesday.    Labs drawn per protocol      Adverse events: None to report  Update to Adverse event of multiple skin irritation on chest: Examined subject chest, irritations have resolved as of 1/25/2019. No additional treatment was required.      Plan: Day 16 clinic visit. Appointment made for Tuesday January 29th at 0730.     Ani Turner RN      Current Outpatient Medications:   ?  acetaminophen (TYLENOL) 650 MG CR tablet, Take 650 mg by mouth every 8 (eight) hours as needed for pain.    , Disp: , Rfl:   ?  aspirin 81 mg chewable tablet, Chew 1 tablet (81 mg total) daily., Disp: , Rfl: 0  ?  atorvastatin (LIPITOR) 80 MG tablet, Take 1 tablet (80 mg total) by mouth daily., Disp: 30 tablet, Rfl: 11  ?  carvedilol (COREG) 12.5 MG tablet, Take 1 tablet (12.5 mg total) by mouth 2 (two) times a day with meals Take 12.5 mg +3.125 mg =15.625., Disp: 60 tablet, Rfl: 11  ?  carvedilol (COREG) 3.125 MG tablet, Take 1 tablet (3.125 mg total) by mouth 2 (two) times a day with meals Take 12.5 mg +3.125 mg =15.625 two times a day., Disp: 60 tablet, Rfl: 1  ?  furosemide (LASIX) 20 MG tablet, Take 0.5 tablets (10 mg total) by mouth as needed (For fluid retention, sob)., Disp: 30 tablet, Rfl: 1  ?  lisinopril (PRINIVIL,ZESTRIL) 2.5 MG tablet, Take 1  tablet (2.5 mg total) by mouth daily. Take 2.5 mg + 5 mg of Lisinopril (total 7.5 mg) daily, Disp: 30 tablet, Rfl: 11  ?  lisinopril (PRINIVIL,ZESTRIL) 5 MG tablet, Take 1 tablet (5 mg total) by mouth daily Take 5 mg +2.5 mg (7.5 mg daily)., Disp: 30 tablet, Rfl: 11  ?  ticagrelor (BRILINTA) 90 mg Tab, Take 1 tablet (90 mg total) by mouth 2 (two) times a day., Disp: 60 tablet, Rfl: 11  No current facility-administered medications for this visit.

## 2021-06-27 NOTE — PROGRESS NOTES
Progress Notes by Ani Turner RN at 2/5/2019  9:00 AM     Author: Ani Turner RN Service: -- Author Type: Registered Nurse    Filed: 2/5/2019  9:31 AM Encounter Date: 2/5/2019 Status: Signed    : nAi Turner RN (Registered Nurse)           Jonh-491-003 Study Reconsent Visit    Study description: Jonh-[id number] is a randomized, double-blind, placebo controlled two-part, adaptive design study of safety, tolerability, preliminary pharmacokinetics, and pharmacodynamics of single and multiple ascending oral doses of JONH-491 in patients with stable heart failure with reduced ejection fraction    Daniele Becker a 53 y.o. male , was seen in the [facility] Care [facility] today to discuss the updated informed consent dated 18 Dec 2018.    The updated consent form was reviewed with the patient, specifically highlighting the changes made in the consent update.     The subject was provided time to review the consent form and his questions were answered to his satisfaction.     The patient has voluntarily agreed to sign the updated consent form, Version 18 Dec 2018 signed on 02/05/19.     The subject was provided with a copy of the signed consent form and a copy of the signed Consent  form was forwarded to medical records.    No study procedures were done prior to signing informed consent.    Plan: Subject had repeat CBC drawn for day 16 (local and central) as tube was clotted at central lab and could not be processed.     Ani Turner RN

## 2021-06-27 NOTE — PROGRESS NOTES
Progress Notes by Eliazar Do NP at 3/19/2019  3:30 PM     Author: Eliazar Do NP Service: -- Author Type: Nurse Practitioner    Filed: 3/19/2019  4:06 PM Encounter Date: 3/19/2019 Status: Signed    : Eliazar Do NP (Nurse Practitioner)           Click to link to [facility]     [facility] HEART CARE NOTE      Assessment/Recommendations   Assessment:    1. Ischemic cardiomyopathy with systolic dysfunction, NYHA class I with EF of 40% in January 2019-status post ICD July 2018: Well compensated with no signs and symptoms of heart failure. Weight has been stable.  Follows low-sodium diet.  He has been tolerating the increased dose of carvedilol.    Left clavicle incision site intact with no signs of infection or hematoma.  Reports sensitive with any pressure and with seatbelt.  Blood pressure today is 122/74 and heart rate 68.    2.  Coronary artery disease with status post MI and known  of proximal LAD with no viability on MRI: He is asymptomatic.  He is on aspirin, Brilinta, and atorvastatin.  He denies bleeding complications.    Plan:  1.  We discussed about increasing his carvedilol dose which he agreed.  He was encouraged to call if experiences lightheadedness, dizziness or excessive fatigue.     Heart failure medications:  - Beta blocker therapy with carvedilol increased from 18.75 mg twice a day to 25 mg twice a day  - ACEI therapy with lisinopril 7.5 mg daily  -  Diuretic therapy with furosemide 10 mg as needed    2.  Continue low-sodium and heart healthy diet, daily weight monitoring, and exercise as tolerated    3.  Echocardiogram in April as recommended by Dr. Israel    Follow up with Dr. Israel in April as scheduled     History of Present Illness    Daniele Becker is a 52 y.o. years old with a significant PMH of recent acute MI involving LAD,  of proximal LAD with no viability on MRI,, dyslipidemia, anemia, former tobacco abuse, and ischemic cardiomyopathy with systolic  dysfunction s/p single chamber IC (7/2018)who is seen at [facility] for continued follow-up.  He recently participated in [employer] research.  He had a repeat echocardiogram showed improvement in left ventricular systolic function with EF of 40% compared to previous echocardiogram done in June 2018 with EF of 28%.     During last heart failure clinic visit, his carvedilol dose was increased and has been tolerating well. He denies fatigue, lightheadedness, shortness of breath, orthopnea, PND, palpitations, chest pain, abdominal fullness/bloating and lower extremity edema.  He reports mild shortness of breath when he takes a flight of stairs but resolves immediately.    He continues to work full-time that involves a lot of physical physical activity and exertion.  He has been tolerating this well without any symptoms.  His weight at home has been stable between 194-200 pounds.  He has not had to take any furosemide as needed dose.  He reports his home blood pressure staying mostly in systolic 120s.     ECHO-Reviewed:   Results for orders placed during the hospital encounter of 01/14/19   Echo Complete [ECH10] 01/24/2019    Narrative   Systolic ejection time: 301 ms    Normal left ventricular size.The calculated left ventricular ejection   fraction is 40%. This represents a moderately decreased ejection fraction.    Normal right ventricular size and systolic function.    Mild tricuspid valve regurgitation.    When compared to the previous study dated 1/23/2019, no significant   change.         Physical Examination Review of Systems   Vitals:    03/19/19 1520   BP: 122/74   Pulse: 68   Resp: 18     Body mass index is 32.58 kg/m .  Wt Readings from Last 3 Encounters:   03/19/19 208 lb (94.3 kg)   02/05/19 207 lb (93.9 kg)   01/29/19 207 lb 4.8 oz (94 kg)       General Appearance:     Alert, cooperative and in no acute distress.   ENT/Mouth: membranes moist, no oral lesions or bleeding gums.      EYES:  no  scleral icterus, normal conjunctivae   Neck: no carotid bruits or thyromegaly   Chest/Lungs:   lungs are clear to auscultation, no rales or wheezing, respirations unlabored   Cardiovascular:    Normal first and second heart sounds with no murmurs, rubs, or gallops; the carotid, radial and posterior tibial pulses are intact, Jugular venous pressure flat with no HJR,no edema bilateral lower extremities    Abdomen:  Soft, nontender, nondistended, bowel sounds present   Extremities: no cyanosis or clubbing   Skin: warm, dry.    Neurologic: mood and affect are appropriate, alert and oriented x3      General: WNL  Eyes: WNL  Ears/Nose/Throat: WNL  Lungs: WNL  Heart: WNL  Stomach: WNL  Bladder: WNL  Muscle/Joints: WNL  Skin: WNL  Nervous System: WNL  Mental Health: WNL     Blood: WNL     Medical History  Surgical History Family History Social History   Past Medical History:   Diagnosis Date   ? Chronic systolic heart failure (H) 5/23/2018   ? Coronary artery disease    ? Dyslipidemia    ? Hypertension    ? Ischemic cardiomyopathy 4/6/2018    LVEF 17% echo May 2018 (anterior, apical, inferior apical akinesis) Coronary angio May 5, 2018:  prox LAD, R >L collaterals attempted PCI (unsuccessful) Cardiac MRI April 20, 2018: LVEF 22% transmural MI (nonviable) apical, distal anterior/ anterolateral/ septal/ basal walls   ? Tobacco dependence in remission 4/5/2018    Past Surgical History:   Procedure Laterality Date   ? CV CORONARY ANGIOGRAM N/A 4/5/2018    Procedure: Coronary Angiogram;  Surgeon: Wilber Darby MD;  Location: [facility];  Service:    ? CV LEFT HEART CATHETERIZATION WO LEFT VETRICULOGRAM Left 4/5/2018    Procedure: Left Heart Catheterization Without Left Ventriculogram;  Surgeon: Wilber Darby MD;  Location: [facility];  Service:    ? CYST REMOVAL      neck   ? EP ICD INSERT N/A 7/6/2018    Procedure: EP ICD Insert;  Surgeon: Hany Fernández MD;  Location: [facility];   Service:    ? KNEE ARTHROSCOPY      Family History   Problem Relation Age of Onset   ? Anuerysm Father         ' at 76 from rupture of vessel'    Social History     Socioeconomic History   ? Marital status:      Spouse name: Not on file   ? Number of children: Not on file   ? Years of education: Not on file   ? Highest education level: Not on file   Occupational History   ? Not on file   Social Needs   ? Financial resource strain: Not on file   ? Food insecurity:     Worry: Not on file     Inability: Not on file   ? Transportation needs:     Medical: Not on file     Non-medical: Not on file   Tobacco Use   ? Smoking status: Former Smoker     Types: Cigarettes     Last attempt to quit: 2016     Years since quittin.8   ? Smokeless tobacco: Former User     Types: Snuff   Substance and Sexual Activity   ? Alcohol use: Yes     Comment: last drink 2 weeks ago   ? Drug use: No   ? Sexual activity: Not on file   Lifestyle   ? Physical activity:     Days per week: Not on file     Minutes per session: Not on file   ? Stress: Not on file   Relationships   ? Social connections:     Talks on phone: Not on file     Gets together: Not on file     Attends Confucianism service: Not on file     Active member of club or organization: Not on file     Attends meetings of clubs or organizations: Not on file     Relationship status: Not on file   ? Intimate partner violence:     Fear of current or ex partner: Not on file     Emotionally abused: Not on file     Physically abused: Not on file     Forced sexual activity: Not on file   Other Topics Concern   ? Not on file   Social History Narrative    Works at [employer]  and lives with wife.     Has grandchildren and wife babysits them.        Eun Castro MD  2018        No medical insurance yet and this has been a barrier in getting adequate cares.  Unable to get a LifeVest as they have to pay out of pocket.    Followed by  at [facility].         Eun Castro MD  4/11/2018                      Medications  Allergies   Current Outpatient Medications   Medication Sig Dispense Refill   ? acetaminophen (TYLENOL) 650 MG CR tablet Take 650 mg by mouth every 8 (eight) hours as needed for pain.            ? aspirin 81 mg chewable tablet Chew 1 tablet (81 mg total) daily.  0   ? atorvastatin (LIPITOR) 80 MG tablet Take 1 tablet (80 mg total) by mouth daily. 30 tablet 11   ? furosemide (LASIX) 20 MG tablet Take 0.5 tablets (10 mg total) by mouth as needed (For fluid retention, sob). 30 tablet 1   ? lisinopril (PRINIVIL,ZESTRIL) 2.5 MG tablet Take 1 tablet (2.5 mg total) by mouth daily. Take 2.5 mg + 5 mg of Lisinopril (total 7.5 mg) daily 30 tablet 11   ? lisinopril (PRINIVIL,ZESTRIL) 5 MG tablet Take 1 tablet (5 mg total) by mouth daily Take 5 mg +2.5 mg (7.5 mg daily). 30 tablet 11   ? ticagrelor (BRILINTA) 90 mg Tab Take 1 tablet (90 mg total) by mouth 2 (two) times a day. 60 tablet 11   ? carvedilol (COREG) 25 MG tablet Take 1 tablet (25 mg total) by mouth 2 (two) times a day with meals. 60 tablet 11     No current facility-administered medications for this visit.       Allergies   Allergen Reactions   ? No Known Drug Allergies          Lab Results    Chemistry CBC BNP   Lab Results   Component Value Date    CREATININE 0.92 01/29/2019    BUN 18 01/29/2019     01/29/2019    K 4.7 01/29/2019     (H) 01/29/2019    CO2 26 01/29/2019     Creatinine (mg/dL)   Date Value   01/29/2019 0.92   01/24/2019 1.14   01/22/2019 1.12   01/18/2019 1.06    Lab Results   Component Value Date    WBC 7.6 02/05/2019    HGB 10.5 (L) 02/05/2019    HCT 33.4 (L) 02/05/2019    MCV 88 02/05/2019     02/05/2019    Lab Results   Component Value Date     (H) 01/15/2019     BNP (pg/mL)   Date Value   01/15/2019 199 (H)   04/07/2018 1,000 (H)   04/05/2018 1,218 (H)        Eliazar Do [facility]   Heart Failure Clinic           This note has been  dictated using voice recognition software. Any grammatical, typographical, or context distortions are unintentional and inherent to the software

## 2021-06-27 NOTE — PROGRESS NOTES
Progress Notes by Ani Turner RN at 12/26/2018 12:30 PM     Author: Ani Turner RN Service: -- Author Type: Registered Nurse    Filed: 12/26/2018  3:24 PM Encounter Date: 12/26/2018 Status: Signed    : Ani Turner RN (Registered Nurse)           [id number] Study Consent Visit    Study description: [id number] is a randomized, double-blind, placebo controlled two-part, adaptive design study of safety, tolerability, preliminary pharmacokinetics, and pharmacodynamics of single and multiple ascending oral doses of TANI-491 in patients with stable heart failure with reduced ejection fraction    Daniele Russelldrake a 53 y.o. male , was seen in Heart Care Clinic today to discuss participation in the [employer] TANI-[id number] study.    The consent form was reviewed with the patient.     The consent discussion included:    Study purpose     Conflict of interest    Study visits    Randomization scheme     Double blind    Study medication(s)    Risks of participation    Benefits (if any)    Alternatives    Voluntary participation    Confidentiality     Compensation/costs of participation    Injury and legal rights    The subject  was provided time to review the consent form and consider participation. his questions were answered to his satisfaction.   The patient has voluntarily agreed to participate in the above noted study.   The consent form version [id number] (IRB approved [id number]) and HIPPA form version 1 ([id number]) was signed 12/26/18.     [employer]ge form (compensation form) was reviewed with Daniele Becker . Questions answered to their satisfaction. Cl[employer]ierge form, version 1 was signed 12/26/18.  [facility] Unit Guidelines were reviewed with Daniele Becker. Questions were answered to his satisfaction. [facility] Guideline form, version 9  was signed.    The subject was provided with a copy of the signed consent form, HIPPA,Unit Guidelines, &  Cl[employer]ge form. A copy of the signed Consent and HIPAA forms were forwarded to medical records.    No study procedures were done prior to signing informed consent.    Plan: Perform Screening visit today      Ani Turner RN

## 2021-06-27 NOTE — PROGRESS NOTES
Progress Notes by Ani Turner RN at 1/8/2019  3:01 PM     Author: Ani Turner RN Service: -- Author Type: Registered Nurse    Filed: 1/8/2019  5:11 PM Encounter Date: 1/8/2019 Status: Signed    : Caity Israel MD (Physician)    Related Notes: Original Note by Ani Turner RN (Registered Nurse) filed at 1/8/2019  3:07 PM         [id number]      Yes  No Criteria # This study is to be performed in patients with HFrEF due to any etiology. Each patient must meet the following criteria to be included in this study:     [x]  []  1 Able to understand and comply with the study procedures, understand the risks involved in the study, and provide written informed consent according to federal, local, and institutional guidelines before the first study-specific procedure.     [x]  []  2 Men or women 18 to 80 years of age at the Screening visit.     [x]   []  3 Body mass index (BMI) 18 to 40 kg/m2, inclusive, at the Screening visit and all required assessments can be reliably performed.     [x]   []  4 Sinus rhythm or stable atrial pacing with mean resting HR 50-95 beats per minute (bpm), inclusive (Patient will be ineligible to dose if, on Day 1, the predose HR measurement is ? 95 \ bpm. HR is the mean of 3 measurements taken 1 minute apart. A single measurement would not make a patient ineligible).     [x]  []  5 Has stable, chronic HFrEF of moderate severity as defined by all of the following:    [x]  []   Documented LVEF 15% to 40% during Screening (as confirmed by [facility] Central [facility]).     [x]   []   Chronic medication for the treatment of heart failure consistent with current guidelines that has been given at stable doses for ? 2 weeks with no plan to modify during the study. This includes treatment with at least one of the following unless not tolerated or contraindicated: beta-blocker, angiotensin converting enzyme (ACE) inhibitor/angiotensin receptor blocker (ARB)/angiotensin receptor neprilysin  inhibitor (ARNI).     [x]   []  6 Female patients must not be pregnant or lactating. Male patients (including men who have had vasectomies), as there may be a risk of drug being secreted in the ejaculate, should use barrier methods for the duration of the study and for 3 months after the last dose of study medication. All patients, if sexually active, must be using one of the following highly-effective birth control methods from the Screening visit through 3 months after the last dose of investigational medicinal product (IMP): ? Hormonal contraception associated with inhibition of ovulation, intrauterine device (IUD), or intrauterine hormone-release system (IUS) plus barrier (e.g., male using condom or female using diaphragm or cervical cap)   ? Vasectomy plus barrier   ? Female is surgically sterile for 6 months or postmenopausal for 1 year. Permanent sterilization includes hysterectomy, bilateral oophorectomy, bilateral salpingectomy, and/or documented bilateral tubal occlusion at least 6 months prior to Screening. Females are considered postmenopausal if they have had amenorrhea for at least 1 year or more following cessation of all exogenous hormonal treatments, and follicle-stimulating hormone (FSH) levels are in the postmenopausal range.          Yes No Criteria #  Exclusion Criteria (all must be no)   []    [x]  1 Inadequate echocardiographic acoustic windows      []  [x]  2 Any of the following ECG abnormalities: (a) QTcF > 470 ms (Fridericias correction, not attributable to pacing or prolonged QRS duration, average of triplicate Screening ECG's) or (b) second-degree atrioventricular block type II or higher     []  [x]  3 Hypersensitivity to TANI-491 or any of the components of the TANI-491 formulation     []  [x]  4 Active infection as indicated clinically as determined by the investigator      []  [x]  5 History of malignancy of any type within 5 years prior to Screening, with the exception of the following  surgically excised cancers occurring more than 2 years prior to Screening: in situ cervical cancer, non melanomatous skin cancers, ductal carcinoma in situ, and nonmetastatic prostate cancer      []  [x]  6 Positive serologic test at Screening for infection with human immunodeficiency virus (HIV), hepatitis C virus (HCV), or hepatitis B virus (HBV)     [] [x]  7 Hepatic impairment (defined as alanine aminotransferase (ALT)/ aspartate aminotransferase (AST) > 3 times ULN and/or total bilirubin (TBL) > 2 times ULN)      []  [x]  8 Severe renal insufficiency (defined as current estimated glomerular filtration rate [eGFR] < 30 mL/min/1.73 m2 by simplified Modification of Diet in Renal Disease equation [sMDRD])      []  [x]  9 Serum potassium < 3.5 or > 5.5 mEq/L      []  [x]  10  Any persistent out-of-range safety laboratory parameters (chemistry, hematology, urinalysis), considered by the investigator and medical monitor to be clinically significant    []  [x]  11 History or evidence of any other clinically significant disorder, condition, or disease (including substance abuse) that, in the opinion of the investigator or [facility] physician would pose a risk to patient safety or interfere with the study evaluation, procedures, or completion, or lead to premature withdrawal from the study   []  [x]  12 Participated in a clinical trial in which the patient received any investigational drug (or is currently using an investigational device) within 30 days prior to Screening, or at least 5 times the respective elimination half-life (whichever is longer)    []  [x]  13 Previous participation in a clinical trial with TANI-491, with the exception that patients that participated or were screen failures in Part 1 of this trial (single-dose cohorts) may subsequently participate in Part 2 (multiple dose)    []  [x]  14 Unable to comply with the study restrictions/requirements, including, in particular, the number of required  overnight stays at the clinical site    []  [x]  15 Is employed by, or is a first-degree relative of someone employed by, [employer] or [employer], the investigator, or his/her staff or family    []  [x]  16 At Screening, symptomatic hypotension, or systolic blood pressure (BP) > 170 mmHg or < 90 mmHg, or diastolic BP > 95 mmHg, or HR < 50 bpm. HR and BP will be the mean of 3 measurements taken at least 1 minute apart   []  [x]  17 Current angina pectoris    []  [x]  18 Recent (<90 days) acute coronary syndrome   []  [x]  19 Coronary revascularization (percutaneous coronary intervention [PCI] or coronary artery bypass graft [CABG]) within the prior 3 months   []  [x]  20 Recent (<90 days) hospitalization for heart failure, use of chronic IV inotropic therapy,` or other cardiovascular event (e.g., cerebrovascular accident)   []  [x]  21 Uncorrected severe valvular disease   []  [x]  22 Elevated troponin (> 0.15 ng/mL) at Screening, based on Central Laboratory assessments. Note: Central Laboratory assay ULN is 0.03 ng/mL    []  [x]  23 Presence of disqualifying cardiac rhythms that would preclude study ECG or echocardiographic assessments, including: (a) Current atrial fibrillation, (b) recent (<2 weeks) persistent atrial fibrillation, or (c) frequent premature ventricular contractions. Note: Patients with active cardiac resynchronization therapy (CRT) or pace-maker (PM) are eligible if initiated at least 2 months prior with no plan to change CRT or PM settings during the study.   []  [x]  24 A life expectancy of <6 months     Ani Turner RN

## 2021-06-27 NOTE — PROGRESS NOTES
Progress Notes by Nelly Monge at 12/11/2018  9:00 AM     Author: Nelly Monge Service: -- Author Type: Research Associate    Filed: 12/11/2018  4:21 PM Encounter Date: 12/11/2018 Status: Signed    : Nelly Monge (Research Associate)           [id number] Study Consent Visit    Study description: [id number] is a randomized, double-blind, placebo controlled two-part, adaptive design study of safety, tolerability, preliminary pharmacokinetics, and pharmacodynamics of single and multiple ascending oral doses of TANI-491 in patients with stable heart failure with reduced ejection fraction    Daniele Becker a 53 y.o. male, was seen in [facility] at [facility] to discuss participation in the [facility] study. The consent discussion began on 12/11/2018.    The consent form was reviewed with the patient.     The consent discussion included:    Study purpose     Conflict of interest    Study visits    Randomization scheme     Double blind    Study medication(s)    Risks of participation    Benefits (if any)    Alternatives    Voluntary participation    Confidentiality     Compensation/costs of participation    Injury and legal rights    The subject was provided time to review the consent form and consider participation. his questions were answered to his satisfaction.     [facility] Guidelines were reviewed with Daniele KAUR Rosita. Questions answered to their satisfaction. The subject was provided with a copy of the consent form & Clincierge form.     Today visit was just education visit about the study.    Plan: Subject will discuss with family and research team will call on Friday 12/15/2018 to schedule a consent and screening visit.    Nelly Monge

## 2021-06-27 NOTE — PROGRESS NOTES
Progress Notes by Ani Turner RN at 1/29/2019  9:02 AM     Author: Ani Turner RN Service: -- Author Type: Registered Nurse    Filed: 1/29/2019  9:41 AM Encounter Date: 1/29/2019 Status: Signed    : Caity Israel MD (Physician)    Related Notes: Original Note by Ani Turner RN (Registered Nurse) filed at 1/29/2019  9:10 AM        [id number]    Adverse Event: Injured Right Hand     Subject injured his right hand at work. Feels it is most likely broken but did not seek medical attention. Upon examination hand is swollen with immobility of 5th digit. No treatment done at home per patient (ie OTC medication, ice, or elevation)    Start Date: 28 Jan 2019    Start Time: 1200     End Date:  N/A    End Time: N/A    Ongoing: [x]  Yes  []  No    Severity:  [x] Mild  [x]  Moderate   [] Severe    Dr. Israel: Is there a reasonable possibility that the event may have been caused by the IMP/study medication:  [x] Not Related   []Related    Action taken with IMP/study medication:  []Dose Not Changed  [] Drug Interrupted   []Drug Withdrawn  [x]Not Applicable   []Other     If other, specify:      Any other action taken (not related to IMP/study medication)?  []Yes  [x] No      Outcome: [] Fatal  [] Not Recovered/Not Resolved  [] Recovered/Resolved      [] Recovered/Resolved with Sequelae  [x] Recovering/Resolving      [] Unknown    Describe Sequelae: (If event recovered or resolved with sequelae describe)     Concomitant or Additional Treatment Required:  [x] None  [] Concomitant Medication   [] Other       Caused Study Discontinuation: [] Yes [x] No     Is the adverse event serious:  [] Yes (if Yes answer remaining questions)     [x]No (if No, no further responses are required on form)    Congenital anomaly or birth defect:  [] Yes [x] No    Persistent or Significant Disability or Incapacity:  [] Yes [x] No    Death: [] Yes [x] No      Hospitalization or prolongation of existing hospitalization: [] Yes [x]  No      Immediatly Life Threatening: [] Yes [x] No    Ani Turner, RN

## 2021-06-29 NOTE — PROGRESS NOTES
"Progress Notes by Caity Israel MD at 4/24/2020  2:30 PM     Author: Caity Israel MD Service: -- Author Type: Physician    Filed: 4/24/2020  2:55 PM Encounter Date: 4/24/2020 Status: Signed    : Caity Israel MD (Physician)           The patient has been notified of following:     \"This telephone visit will be conducted via a call between you and your physician/provider. We have found that certain health care needs can be provided without the need for a physical exam.  This service lets us provide the care you need with a phone conversation.  If a prescription is necessary we can send it directly to your pharmacy.  If lab work is needed we can place an order for that and you can then stop by our lab to have the test done at a later time. If during the course of the call the physician/provider feels a telephone visit is not appropriate, you will not be charged for this service.\" Verbal consent has been obtained for this service by care team member:         HEART CARE PHONE ENCOUNTER        The patient has chosen to have the visit conducted as a telephone visit, to reduce risk of exposure given the current status of Coronavirus in our community. This telephone visit is being conducted via a call between the patient and physician/provider. Health care needs are being provided without a physical exam.     Assessment/Recommendations   Assessment:    1. ST elevation myocardial infarction involving left anterior descending (LAD) coronary artery (H) -this was of the left anterior descending due to total occlusion in April 2018.  MRI showed no viability and stable. Work on prevention without any significant symptoms.   2. Coronary artery disease of native heart with stable angina pectoris, unspecified vessel or lesion type (H) -angiography April 2018 showed normal left main, left anterior descending with a proximal total occlusion, normal circumflex and normal right coronary artery.  No viability thus " no  procedure.   3. Ischemic cardiomyopathy -ejection fraction on MRI 21%.  Received ICD July 6, 2018.  On appropriate lisinopril and carvedilol at maximum dose.  A;lso has an ICD in place.    4. Pure hypercholesterolemia -cholesterol 157 with an LDL of 113 and will renew Kaman stop atorvastatin.   5. ICD (implantable cardioverter-defibrillator), single, in situ -[employer] device with [place] lead without any major arrhythmias or alerts.  Due to insurance and cost issues will change to yearly device checks.   6. Chronic systolic heart failure (H) -no significant signs or symptoms currently and he does not take Lasix.   7.  Hypertension-blood pressures at home 105-140, diastolic 73-95.  He admits to noncompliance with medications.  Will renew today.    Plan:  1.  Renew antihypertensive medications today.  2.  Change device checks to yearly given cost issues.    Follow Up Plan: Follow up in 1 year  I have reviewed the note as documented.  This accurately captures the substance of my conversation with the patient.    Total time of call between patient and provider was 12 minutes   Start Time: 1432  Stop Time: 1444       History of Present Illness/Subjective    Daniele Becker is a 54 y.o. male who is being evaluated via a billable telephone visit.    54-year-old gentleman for yearly evaluation of the phone call due to coronavirus pandemic.  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  Overall however no syncope, dizziness, chest discomfort, palpitations, shortness of breath, PND, orthopnea or peripheral edema.    I have reviewed and updated the patient's Past Medical History, Social History, Family History and Medication List.     Physical Examination not performed given phone encounter Review of Systems      Review of Systems - History obtained from the patient  General ROS: negative  Psychological ROS: negative  Ophthalmic ROS: negative  ENT ROS:  negative  Hematological and Lymphatic ROS: negative  Respiratory ROS: negative  Cardiovascular ROS: negative  Gastrointestinal ROS: negative  Genito-Urinary ROS: negative  Musculoskeletal ROS: negative  Neurological ROS: negative  Dermatological ROS: negative                                         Medical History  Surgical History Family History Social History   Past Medical History:   Diagnosis Date   ? Chronic systolic heart failure (H) 2018   ? Coronary artery disease    ? Dyslipidemia    ? Hypertension    ? Ischemic cardiomyopathy 2018    LVEF 17% echo May 2018 (anterior, apical, inferior apical akinesis) Coronary angio May 5, 2018:  prox LAD, R >L collaterals attempted PCI (unsuccessful) Cardiac MRI 2018: LVEF 22% transmural MI (nonviable) apical, distal anterior/ anterolateral/ septal/ basal walls   ? Tobacco dependence in remission 2018    Past Surgical History:   Procedure Laterality Date   ? CV CORONARY ANGIOGRAM N/A 2018    Procedure: Coronary Angiogram;  Surgeon: Wilber Darby MD;  Location: [facility];  Service:    ? CV LEFT HEART CATHETERIZATION WO LEFT VETRICULOGRAM Left 2018    Procedure: Left Heart Catheterization Without Left Ventriculogram;  Surgeon: Wilber Darby MD;  Location: [facility];  Service:    ? CYST REMOVAL      neck   ? EP ICD INSERT N/A 2018    Procedure: EP ICD Insert;  Surgeon: Hany Fernández MD;  Location: [facility] Lab;  Service:    ? KNEE ARTHROSCOPY      Family History   Problem Relation Age of Onset   ? Anuerysm Father         ' at 76 from rupture of vessel'    Social History     Socioeconomic History   ? Marital status:      Spouse name: Not on file   ? Number of children: Not on file   ? Years of education: Not on file   ? Highest education level: Not on file   Occupational History   ? Not on file   Social Needs   ? Financial resource strain: Not on file   ? Food insecurity     Worry: Not  on file     Inability: Not on file   ? Transportation needs     Medical: Not on file     Non-medical: Not on file   Tobacco Use   ? Smoking status: Former Smoker     Types: Cigarettes     Last attempt to quit: 05/2016     Years since quitting: 3.9   ? Smokeless tobacco: Former User     Types: Snuff   Substance and Sexual Activity   ? Alcohol use: Yes     Comment: last drink 2 weeks ago   ? Drug use: No   ? Sexual activity: Not on file   Lifestyle   ? Physical activity     Days per week: Not on file     Minutes per session: Not on file   ? Stress: Not on file   Relationships   ? Social connections     Talks on phone: Not on file     Gets together: Not on file     Attends Taoist service: Not on file     Active member of club or organization: Not on file     Attends meetings of clubs or organizations: Not on file     Relationship status: Not on file   ? Intimate partner violence     Fear of current or ex partner: Not on file     Emotionally abused: Not on file     Physically abused: Not on file     Forced sexual activity: Not on file   Other Topics Concern   ? Not on file   Social History Narrative    Works at [email or web address].  and lives with wife.     Has grandchildren and wife babysits them.        Eun Castro MD  4/5/2018        No medical insurance yet and this has been a barrier in getting adequate cares.  Unable to get a LifeVest as they have to pay out of pocket.    Followed by  at [facility].        Eun Castro MD  4/11/2018                      Medications  Allergies   Current Outpatient Medications   Medication Sig Dispense Refill   ? acetaminophen (TYLENOL) 650 MG CR tablet Take 650 mg by mouth every 8 (eight) hours as needed for pain.            ? aspirin 81 mg chewable tablet Chew 1 tablet (81 mg total) daily.  0   ? atorvastatin (LIPITOR) 80 MG tablet Take 1 tablet by mouth once daily 90 tablet 0   ? carvedilol (COREG) 25 MG tablet Take 1 tablet (25 mg total) by mouth 2  (two) times a day with meals. 60 tablet 11   ? lisinopril (PRINIVIL,ZESTRIL) 2.5 MG tablet Take 1 tablet (2.5 mg total) by mouth daily. Take 2.5 mg + 5 mg of Lisinopril (total 7.5 mg) daily 90 tablet 2   ? lisinopriL (PRINIVIL,ZESTRIL) 5 MG tablet TAKE 1 TABLET BY MOUTH ONCE DAILY .  TAKE  WITH  LISINOPRIL  2.5  MG-TOTAL  OF  7.5  MG  DAILY 90 tablet 0   ? furosemide (LASIX) 20 MG tablet Take 0.5 tablets (10 mg total) by mouth as needed (For fluid retention, sob). 30 tablet 1     No current facility-administered medications for this visit.     Allergies   Allergen Reactions   ? No Known Drug Allergies          Lab Results    Chemistry/lipid CBC Cardiac Enzymes/BNP/TSH/INR   Lab Results   Component Value Date    CHOL 157 04/06/2018    HDL 27 (L) 04/06/2018    LDLCALC 113 04/06/2018    TRIG 86 04/06/2018    CREATININE 0.92 01/29/2019    BUN 18 01/29/2019    K 4.7 01/29/2019     01/29/2019     (H) 01/29/2019    CO2 26 01/29/2019    Lab Results   Component Value Date    WBC 7.6 02/05/2019    HGB 10.5 (L) 02/05/2019    HCT 33.4 (L) 02/05/2019    MCV 88 02/05/2019     02/05/2019    Lab Results   Component Value Date    CKTOTAL 158 01/29/2019    CKMB 3 04/05/2018    TROPONINI 0.06 01/29/2019     (H) 01/15/2019    INR 1.08 04/05/2018        Caity Israel

## 2021-06-30 NOTE — PROGRESS NOTES
Progress Notes by Caity Israle MD at 4/5/2021 11:10 AM     Author: Caity Israel MD Service: -- Author Type: Physician    Filed: 4/5/2021 11:51 AM Encounter Date: 4/5/2021 Status: Signed    : Caity Israel MD (Physician)           [facility]  Heart Care Clinic Follow-up Note    Assessment & Plan        1. Chronic systolic heart failure (H)-no significant signs or symptoms currently and takes as needed furosemide.   2. Ischemic cardiomyopathy  -ejection fraction on MRI 21%.  Received ICD July 6, 2018.  On appropriate lisinopril and carvedilol at maximum dose.  Also has an ICD in place.  Most recent echo showed ejection fraction of 40% from 2019, I would like to recheck echo and if need be switch lisinopril over to Entresto.  However patient has no insurance and cannot afford an echo right now and will hold off on this, in any event Entresto probably would not be covered.   3. Dyslipidemia-cholesterol 157 with LDL of 113, and on maximum dose of atorvastatin at 80 mg and given cost issues as above we will not recheck.   4. Coronary artery disease due to lipid rich plaque -angiography April 2018 showed normal left main, left anterior descending with a proximal total occlusion, normal circumflex and normal right coronary artery.  No viability thus no  procedure.   5. Obesity-weight loss.   6. Benign essential hypertension-under good control on lisinopril and carvedilol and as above if able would like to get Entresto.   7.  ICD-[employer] device with [place] lead with only one nonsustained VT episode which was asymptomatic.  Due to have device check in September 2021.    Plan  1.  Due to lack of insurance will not check echo, will not switch over to Entresto, will not check fasting lipids.  2.  Work on weight loss.  3.  Follow-up with me in 1 year with device check or sooner if needed.    Subjective  CC: 55-year-old white gentleman here for follow-up's.  Back at work.  Physically  "active and wonders if he can take a nonsteroidal anti-inflammatory drug such as Aleve, it is okay to take once a day I told him.  No major cardiovascular issues. Patient complains of no syncope, dizziness, fatigue, fevers, chest pain, palpitations, shortness of breath, PND, orthopnea, nausea, vomiting, or edema.    Medications  Current Outpatient Medications   Medication Sig   ? acetaminophen (TYLENOL) 650 MG CR tablet Take 650 mg by mouth every 8 (eight) hours as needed for pain.          ? aspirin 81 mg chewable tablet Chew 1 tablet (81 mg total) daily.   ? atorvastatin (LIPITOR) 80 MG tablet Take 1 tablet (80 mg total) by mouth daily.   ? carvediloL (COREG) 25 MG tablet Take 1 tablet (25 mg total) by mouth 2 (two) times a day with meals.   ? furosemide (LASIX) 20 MG tablet Take 0.5 tablets (10 mg total) by mouth as needed (For fluid retention, sob).   ? lisinopriL (PRINIVIL,ZESTRIL) 2.5 MG tablet Take 1 tablet (2.5 mg total) by mouth daily. Take 2.5 mg + 5 mg of Lisinopril (total 7.5 mg) daily   ? lisinopriL (PRINIVIL,ZESTRIL) 5 MG tablet Take 1 tablet (5 mg total) by mouth daily.       Objective  /80 (Patient Site: Left Arm, Patient Position: Sitting, Cuff Size: Adult Large)   Pulse 64   Resp 16   Ht 5' 7\" (1.702 m)   Wt 221 lb (100.2 kg)   BMI 34.61 kg/m      General Appearance:    Alert, cooperative, no distress, appears stated age   Head:    Normocephalic, without obvious abnormality, atraumatic   Throat:   Lips, mucosa, and tongue normal; teeth and gums normal   Neck:   Supple, symmetrical, trachea midline, no adenopathy;        thyroid:  No enlargement/tenderness/nodules; no carotid    bruit or JVD   Back:     Symmetric, no curvature, ROM normal, no CVA tenderness   Lungs:     Clear to auscultation bilaterally, respirations unlabored   Chest wall:    No tenderness, left-sided ICD   Heart:    Regular rate and rhythm, S1 and S2 normal, no murmur, rub   or gallop   Abdomen:     Soft, non-tender, " bowel sounds active all four quadrants,     no masses, no organomegaly   Extremities:   Normal, atraumatic, no cyanosis or edema   Pulses:   2+ and symmetric all extremities   Skin:   Skin color, texture, turgor normal, no rashes or lesions     Results    Lab Results personally reviewed   Lab Results   Component Value Date    CHOL 157 04/06/2018     Lab Results   Component Value Date    HDL 27 (L) 04/06/2018     Lab Results   Component Value Date    LDLCALC 113 04/06/2018     Lab Results   Component Value Date    TRIG 86 04/06/2018     Lab Results   Component Value Date    WBC 7.6 02/05/2019    HGB 10.5 (L) 02/05/2019    HCT 33.4 (L) 02/05/2019     02/05/2019     Lab Results   Component Value Date    CREATININE 0.92 01/29/2019    BUN 18 01/29/2019     01/29/2019    K 4.7 01/29/2019    CO2 26 01/29/2019     Review of Systems:   General: WNL  Eyes: WNL  Ears/Nose/Throat: WNL  Lungs: WNL  Heart: WNL  Stomach: WNL  Bladder: WNL  Muscle/Joints: WNL  Skin: WNL  Nervous System: WNL  Mental Health: WNL     Blood: WNL

## 2021-07-03 NOTE — ADDENDUM NOTE
Addendum Note by Steph Edward RN at 2/5/2019  9:00 AM     Author: Steph Edward RN Service: -- Author Type: Registered Nurse    Filed: 2/5/2019  9:44 AM Encounter Date: 2/5/2019 Status: Signed    : Steph Edward RN (Registered Nurse)    Addended by: STEPH EDWARD on: 2/5/2019 09:44 AM        Modules accepted: Orders

## 2021-07-14 PROBLEM — I95.9 HYPOTENSION, UNSPECIFIED HYPOTENSION TYPE: Status: RESOLVED | Noted: 2018-11-13 | Resolved: 2019-03-19

## 2021-07-14 PROBLEM — I50.21 ACUTE SYSTOLIC CONGESTIVE HEART FAILURE (H): Status: RESOLVED | Noted: 2018-04-06 | Resolved: 2018-05-23

## 2021-07-14 PROBLEM — I24.9 ACS (ACUTE CORONARY SYNDROME) (H): Status: RESOLVED | Noted: 2018-04-05 | Resolved: 2018-04-19

## 2021-09-20 ENCOUNTER — ANCILLARY PROCEDURE (OUTPATIENT)
Dept: CARDIOLOGY | Facility: CLINIC | Age: 56
End: 2021-09-20
Attending: INTERNAL MEDICINE

## 2021-09-20 DIAGNOSIS — Z95.810 ICD (IMPLANTABLE CARDIOVERTER-DEFIBRILLATOR) IN PLACE: ICD-10-CM

## 2021-09-20 LAB
MDC_IDC_EPISODE_DTM: NORMAL
MDC_IDC_EPISODE_ID: NORMAL
MDC_IDC_EPISODE_TYPE: NORMAL
MDC_IDC_LEAD_IMPLANT_DT: NORMAL
MDC_IDC_LEAD_LOCATION: NORMAL
MDC_IDC_LEAD_LOCATION_DETAIL_1: NORMAL
MDC_IDC_LEAD_MFG: NORMAL
MDC_IDC_LEAD_MODEL: NORMAL
MDC_IDC_LEAD_POLARITY_TYPE: NORMAL
MDC_IDC_LEAD_SERIAL: NORMAL
MDC_IDC_MSMT_BATTERY_DTM: NORMAL
MDC_IDC_MSMT_BATTERY_REMAINING_LONGEVITY: 144 MO
MDC_IDC_MSMT_BATTERY_REMAINING_PERCENTAGE: 100 %
MDC_IDC_MSMT_BATTERY_STATUS: NORMAL
MDC_IDC_MSMT_CAP_CHARGE_DTM: NORMAL
MDC_IDC_MSMT_CAP_CHARGE_DTM: NORMAL
MDC_IDC_MSMT_CAP_CHARGE_ENERGY: 21 J
MDC_IDC_MSMT_CAP_CHARGE_TIME: 10.3 S
MDC_IDC_MSMT_CAP_CHARGE_TIME: 3.8 S
MDC_IDC_MSMT_CAP_CHARGE_TYPE: NORMAL
MDC_IDC_MSMT_CAP_CHARGE_TYPE: NORMAL
MDC_IDC_MSMT_LEADCHNL_RV_IMPEDANCE_VALUE: 380 OHM
MDC_IDC_MSMT_LEADCHNL_RV_PACING_THRESHOLD_AMPLITUDE: 1.5 V
MDC_IDC_MSMT_LEADCHNL_RV_PACING_THRESHOLD_PULSEWIDTH: 0.4 MS
MDC_IDC_PG_IMPLANT_DTM: NORMAL
MDC_IDC_PG_MFG: NORMAL
MDC_IDC_PG_MODEL: NORMAL
MDC_IDC_PG_SERIAL: NORMAL
MDC_IDC_PG_TYPE: NORMAL
MDC_IDC_SESS_CLINIC_NAME: NORMAL
MDC_IDC_SESS_DTM: NORMAL
MDC_IDC_SESS_TYPE: NORMAL
MDC_IDC_SET_BRADY_LOWRATE: 40 {BEATS}/MIN
MDC_IDC_SET_BRADY_MODE: NORMAL
MDC_IDC_SET_LEADCHNL_RV_PACING_AMPLITUDE: 2.3 V
MDC_IDC_SET_LEADCHNL_RV_PACING_POLARITY: NORMAL
MDC_IDC_SET_LEADCHNL_RV_PACING_PULSEWIDTH: 0.4 MS
MDC_IDC_SET_LEADCHNL_RV_SENSING_ADAPTATION_MODE: NORMAL
MDC_IDC_SET_LEADCHNL_RV_SENSING_POLARITY: NORMAL
MDC_IDC_SET_LEADCHNL_RV_SENSING_SENSITIVITY: 0.6 MV
MDC_IDC_SET_ZONE_DETECTION_INTERVAL: 250 MS
MDC_IDC_SET_ZONE_DETECTION_INTERVAL: 324 MS
MDC_IDC_SET_ZONE_TYPE: NORMAL
MDC_IDC_SET_ZONE_TYPE: NORMAL
MDC_IDC_SET_ZONE_VENDOR_TYPE: NORMAL
MDC_IDC_SET_ZONE_VENDOR_TYPE: NORMAL
MDC_IDC_STAT_BRADY_DTM_END: NORMAL
MDC_IDC_STAT_BRADY_DTM_START: NORMAL
MDC_IDC_STAT_BRADY_RV_PERCENT_PACED: 0 %
MDC_IDC_STAT_EPISODE_RECENT_COUNT: 0
MDC_IDC_STAT_EPISODE_RECENT_COUNT: 1
MDC_IDC_STAT_EPISODE_RECENT_COUNT_DTM_END: NORMAL
MDC_IDC_STAT_EPISODE_RECENT_COUNT_DTM_START: NORMAL
MDC_IDC_STAT_EPISODE_TYPE: NORMAL
MDC_IDC_STAT_EPISODE_VENDOR_TYPE: NORMAL
MDC_IDC_STAT_TACHYTHERAPY_ATP_DELIVERED_RECENT: 0
MDC_IDC_STAT_TACHYTHERAPY_ATP_DELIVERED_TOTAL: 0
MDC_IDC_STAT_TACHYTHERAPY_RECENT_DTM_END: NORMAL
MDC_IDC_STAT_TACHYTHERAPY_RECENT_DTM_START: NORMAL
MDC_IDC_STAT_TACHYTHERAPY_SHOCKS_ABORTED_RECENT: 0
MDC_IDC_STAT_TACHYTHERAPY_SHOCKS_ABORTED_TOTAL: 0
MDC_IDC_STAT_TACHYTHERAPY_SHOCKS_DELIVERED_RECENT: 0
MDC_IDC_STAT_TACHYTHERAPY_SHOCKS_DELIVERED_TOTAL: 1
MDC_IDC_STAT_TACHYTHERAPY_TOTAL_DTM_END: NORMAL
MDC_IDC_STAT_TACHYTHERAPY_TOTAL_DTM_START: NORMAL

## 2021-09-20 PROCEDURE — 93296 REM INTERROG EVL PM/IDS: CPT | Performed by: INTERNAL MEDICINE

## 2021-09-20 PROCEDURE — 93295 DEV INTERROG REMOTE 1/2/MLT: CPT | Performed by: INTERNAL MEDICINE

## 2022-05-16 ENCOUNTER — OFFICE VISIT (OUTPATIENT)
Dept: CARDIOLOGY | Facility: CLINIC | Age: 57
End: 2022-05-16

## 2022-05-16 VITALS
BODY MASS INDEX: 30.54 KG/M2 | HEART RATE: 69 BPM | OXYGEN SATURATION: 99 % | RESPIRATION RATE: 18 BRPM | SYSTOLIC BLOOD PRESSURE: 136 MMHG | DIASTOLIC BLOOD PRESSURE: 80 MMHG | WEIGHT: 195 LBS

## 2022-05-16 DIAGNOSIS — I25.10 CORONARY ARTERY DISEASE DUE TO LIPID RICH PLAQUE: Primary | ICD-10-CM

## 2022-05-16 DIAGNOSIS — I50.22 CHRONIC SYSTOLIC HEART FAILURE (H): ICD-10-CM

## 2022-05-16 DIAGNOSIS — I25.5 ISCHEMIC CARDIOMYOPATHY: ICD-10-CM

## 2022-05-16 DIAGNOSIS — E78.00 PURE HYPERCHOLESTEROLEMIA: ICD-10-CM

## 2022-05-16 DIAGNOSIS — Z95.810 ICD (IMPLANTABLE CARDIOVERTER-DEFIBRILLATOR), SINGLE, IN SITU: ICD-10-CM

## 2022-05-16 DIAGNOSIS — I10 BENIGN ESSENTIAL HYPERTENSION: ICD-10-CM

## 2022-05-16 DIAGNOSIS — I25.83 CORONARY ARTERY DISEASE DUE TO LIPID RICH PLAQUE: Primary | ICD-10-CM

## 2022-05-16 PROCEDURE — 99213 OFFICE O/P EST LOW 20 MIN: CPT | Performed by: INTERNAL MEDICINE

## 2022-05-16 RX ORDER — ATORVASTATIN CALCIUM 80 MG/1
80 TABLET, FILM COATED ORAL DAILY
Qty: 90 TABLET | Refills: 4 | Status: SHIPPED | OUTPATIENT
Start: 2022-05-16 | End: 2023-01-26

## 2022-05-16 RX ORDER — LISINOPRIL 2.5 MG/1
2.5 TABLET ORAL DAILY
Qty: 90 TABLET | Refills: 4 | Status: SHIPPED | OUTPATIENT
Start: 2022-05-16 | End: 2023-01-26

## 2022-05-16 RX ORDER — COVID-19 ANTIGEN TEST
220 KIT MISCELLANEOUS PRN
COMMUNITY

## 2022-05-16 RX ORDER — LISINOPRIL 5 MG/1
5 TABLET ORAL DAILY
Qty: 90 TABLET | Refills: 4 | Status: SHIPPED | OUTPATIENT
Start: 2022-05-16 | End: 2023-01-26

## 2022-05-16 RX ORDER — CARVEDILOL 25 MG/1
25 TABLET ORAL 2 TIMES DAILY WITH MEALS
Qty: 180 TABLET | Refills: 4 | Status: SHIPPED | OUTPATIENT
Start: 2022-05-16 | End: 2022-10-19 | Stop reason: DRUGHIGH

## 2022-05-16 NOTE — PROGRESS NOTES
[facility]  Heart Care Clinic Follow-up Note    Assessment & Plan        (I25.10,  I25.83) Coronary artery disease due to lipid rich plaque  (primary encounter diagnosis)  Comment: Angiography April 2018 showed normal left main, left anterior descending with a proximal total occlusion, normal circumflex and normal right coronary artery.  No viability thus no  procedure.    (I25.5) Ischemic cardiomyopathy  Comment: Ejection fraction on MRI 21%.  Received ICD July 6, 2018.  On appropriate lisinopril and carvedilol at maximum tolerated dose.  Also has an ICD in place.  Most recent echo showed ejection fraction of 40% from 2019, I would like to recheck echo and if need be switch lisinopril over to Entresto.  However patient has no insurance and cannot afford an echo right now and will hold off on this, in any event Entresto probably would not be covered.  Will look into qualification for our possible confinement study, might get an echo as part of screening.    (I50.22) Chronic systolic heart failure (H)  Comment: No significant signs or symptoms currently and no need for furosemide.    (I10) Benign essential hypertension  Comment: Under good control currently on carvedilol as well as lisinopril.    (E78.00) Pure hypercholesterolemia  Comment: Total cholesterol 157 with LDL of 113, on statin.  Doing well.    (Z95.810) ICD (implantable cardioverter-defibrillator), single, in situ  Comment: [employer] device with [employer] right ventricular lead.  Device check September 2020 showed 7 seconds of VT, most recent device check September 2021 showed no arrhythmias.    Plan  1.  Renew carvedilol, lisinopril, atorvastatin.  2.  Consider qualification in our phase 1 heart failure trial.  3.  Continue device checks regularly.  4.  Follow-up me in 2 years or sooner if needed.  If he does get health insurance will then become 1 year follow-up.  5.  If he runs out of medication he can call me and we will  renew without seeing me due to lack of health insurance.    Subjective  CC: 56-year-old white gentleman being seen in a yearly follow-up today.  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  He states is less stressful.  No syncope, presyncope, fatigue, chest discomfort, palpitations, shortness of breath, PND, orthopnea or peripheral edema.    Medications  Current Outpatient Medications   Medication Sig Dispense Refill     aspirin 81 mg chewable tablet [ASPIRIN 81 MG CHEWABLE TABLET] Chew 1 tablet (81 mg total) daily.  0     atorvastatin (LIPITOR) 80 MG tablet Take 1 tablet (80 mg) by mouth daily 90 tablet 4     carvedilol (COREG) 25 MG tablet Take 1 tablet (25 mg) by mouth 2 times daily (with meals) 180 tablet 4     furosemide (LASIX) 20 MG tablet [FUROSEMIDE (LASIX) 20 MG TABLET] Take 0.5 tablets (10 mg total) by mouth as needed (For fluid retention, sob). 30 tablet 1     lisinopril (ZESTRIL) 2.5 MG tablet Take 1 tablet (2.5 mg) by mouth daily 90 tablet 4     lisinopril (ZESTRIL) 5 MG tablet Take 1 tablet (5 mg) by mouth daily 90 tablet 4     naproxen sodium 220 MG capsule Take 220 mg by mouth as needed         Objective  /80 (BP Location: Left arm, Patient Position: Sitting, Cuff Size: Adult Regular)   Pulse 69   Resp 18   Wt 88.5 kg (195 lb)   SpO2 99%   BMI 30.54 kg/m      General Appearance:    Alert, cooperative, no distress, appears stated age   Head:    Normocephalic, without obvious abnormality, atraumatic   Throat:   Lips, mucosa, and tongue normal; teeth and gums normal   Neck:   Supple, symmetrical, trachea midline, no adenopathy;        thyroid:  No enlargement/tenderness/nodules; no carotid    bruit or JVD   Back:     Symmetric, no curvature, ROM normal, no CVA tenderness   Lungs:     Clear to auscultation bilaterally, respirations unlabored   Chest wall:    No tenderness, left-sided ICD   Heart:    Regular rate and rhythm, S1 and S2 normal, no murmur,  rub   or gallop   Abdomen:     Soft, non-tender, bowel sounds active all four quadrants,     no masses, no organomegaly   Extremities:   Normal, atraumatic, no cyanosis or edema   Pulses:   2+ and symmetric all extremities   Skin:   Skin color, texture, turgor normal, no rashes or lesions     Results    Lab Results personally reviewed   Lab Results   Component Value Date    CHOL 157 04/06/2018    CHOL 231 (H) 09/17/2012     Lab Results   Component Value Date    HDL 27 (L) 04/06/2018    HDL 34 (L) 09/17/2012     No components found for: LDLCALC  Lab Results   Component Value Date    TRIG 86 04/06/2018     Lab Results   Component Value Date    WBC 7.6 02/05/2019    HGB 10.5 (L) 02/05/2019    HCT 33.4 (L) 02/05/2019     02/05/2019     Lab Results   Component Value Date    BUN 18 01/29/2019     01/29/2019    CO2 26 01/29/2019

## 2022-05-16 NOTE — LETTER
5/16/2022    Physician No Ref-Primary  No address on file    RE: Daniele Becker       Dear Colleague,     I had the pleasure of seeing Daniele Becker in the [facility].      [facility]  Heart Care Clinic Follow-up Note    Assessment & Plan        (I25.10,  I25.83) Coronary artery disease due to lipid rich plaque  (primary encounter diagnosis)  Comment: Angiography April 2018 showed normal left main, left anterior descending with a proximal total occlusion, normal circumflex and normal right coronary artery.  No viability thus no  procedure.    (I25.5) Ischemic cardiomyopathy  Comment: Ejection fraction on MRI 21%.  Received ICD July 6, 2018.  On appropriate lisinopril and carvedilol at maximum tolerated dose.  Also has an ICD in place.  Most recent echo showed ejection fraction of 40% from 2019, I would like to recheck echo and if need be switch lisinopril over to Entresto.  However patient has no insurance and cannot afford an echo right now and will hold off on this, in any event Entresto probably would not be covered.  Will look into qualification for our possible confinement study, might get an echo as part of screening.    (I50.22) Chronic systolic heart failure (H)  Comment: No significant signs or symptoms currently and no need for furosemide.    (I10) Benign essential hypertension  Comment: Under good control currently on carvedilol as well as lisinopril.    (E78.00) Pure hypercholesterolemia  Comment: Total cholesterol 157 with LDL of 113, on statin.  Doing well.    (Z95.810) ICD (implantable cardioverter-defibrillator), single, in situ  Comment: [employer] device with [employer] right ventricular lead.  Device check September 2020 showed 7 seconds of VT, most recent device check September 2021 showed no arrhythmias.    Plan  1.  Renew carvedilol, lisinopril, atorvastatin.  2.  Consider qualification in our phase 1 heart failure trial.  3.  Continue device  checks regularly.  4.  Follow-up me in 2 years or sooner if needed.  If he does get health insurance will then become 1 year follow-up.  5.  If he runs out of medication he can call me and we will renew without seeing me due to lack of health insurance.    Subjective  CC: 56-year-old white gentleman being seen in a yearly follow-up today.  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  He states is less stressful.  No syncope, presyncope, fatigue, chest discomfort, palpitations, shortness of breath, PND, orthopnea or peripheral edema.    Medications  Current Outpatient Medications   Medication Sig Dispense Refill     aspirin 81 mg chewable tablet [ASPIRIN 81 MG CHEWABLE TABLET] Chew 1 tablet (81 mg total) daily.  0     atorvastatin (LIPITOR) 80 MG tablet Take 1 tablet (80 mg) by mouth daily 90 tablet 4     carvedilol (COREG) 25 MG tablet Take 1 tablet (25 mg) by mouth 2 times daily (with meals) 180 tablet 4     furosemide (LASIX) 20 MG tablet [FUROSEMIDE (LASIX) 20 MG TABLET] Take 0.5 tablets (10 mg total) by mouth as needed (For fluid retention, sob). 30 tablet 1     lisinopril (ZESTRIL) 2.5 MG tablet Take 1 tablet (2.5 mg) by mouth daily 90 tablet 4     lisinopril (ZESTRIL) 5 MG tablet Take 1 tablet (5 mg) by mouth daily 90 tablet 4     naproxen sodium 220 MG capsule Take 220 mg by mouth as needed         Objective  /80 (BP Location: Left arm, Patient Position: Sitting, Cuff Size: Adult Regular)   Pulse 69   Resp 18   Wt 88.5 kg (195 lb)   SpO2 99%   BMI 30.54 kg/m      General Appearance:    Alert, cooperative, no distress, appears stated age   Head:    Normocephalic, without obvious abnormality, atraumatic   Throat:   Lips, mucosa, and tongue normal; teeth and gums normal   Neck:   Supple, symmetrical, trachea midline, no adenopathy;        thyroid:  No enlargement/tenderness/nodules; no carotid    bruit or JVD   Back:     Symmetric, no curvature, ROM normal, no CVA  tenderness   Lungs:     Clear to auscultation bilaterally, respirations unlabored   Chest wall:    No tenderness, left-sided ICD   Heart:    Regular rate and rhythm, S1 and S2 normal, no murmur, rub   or gallop   Abdomen:     Soft, non-tender, bowel sounds active all four quadrants,     no masses, no organomegaly   Extremities:   Normal, atraumatic, no cyanosis or edema   Pulses:   2+ and symmetric all extremities   Skin:   Skin color, texture, turgor normal, no rashes or lesions     Results    Lab Results personally reviewed   Lab Results   Component Value Date    CHOL 157 04/06/2018    CHOL 231 (H) 09/17/2012     Lab Results   Component Value Date    HDL 27 (L) 04/06/2018    HDL 34 (L) 09/17/2012     No components found for: LDLCALC  Lab Results   Component Value Date    TRIG 86 04/06/2018     Lab Results   Component Value Date    WBC 7.6 02/05/2019    HGB 10.5 (L) 02/05/2019    HCT 33.4 (L) 02/05/2019     02/05/2019     Lab Results   Component Value Date    BUN 18 01/29/2019     01/29/2019    CO2 26 01/29/2019               Thank you for allowing me to participate in the care of your patient.      Sincerely,     FERNANDO DURAN MD     [facility]  cc:   No referring provider defined for this encounter.

## 2022-05-16 NOTE — PATIENT INSTRUCTIONS
Mr Mateusz Ganzer,  I enjoyed visiting with you again today.  I am glad to hear you are doing well.  Per our conversation I renewed meds for you and if run out and need refills call [phone].  I will plan on seeing you 2 year.  Hernan Israel

## 2022-05-16 NOTE — LETTER
Date:May 18, 2022      Provider requested that no letter be sent. Do not send.       [facility]

## 2022-06-17 ENCOUNTER — TELEPHONE (OUTPATIENT)
Dept: CARDIOLOGY | Facility: CLINIC | Age: 57
End: 2022-06-17

## 2022-06-17 DIAGNOSIS — I25.5 ISCHEMIC CARDIOMYOPATHY: Primary | ICD-10-CM

## 2022-06-17 PROCEDURE — 99207 PR NO CHARGE-RESEARCH SERVICE: CPT

## 2022-06-29 NOTE — TELEPHONE ENCOUNTER
Phone call follow up re: [facility] [id number] clinical trial.  Mr. Becker confirmed he had spoken with Kyree Mccann RN re: study.  His questions were regarding follow up visit schedule (time of day, etc.)  Assured him that we could be flexible with time of day of visit, even if we did not have a lot of flexibility regarding the day of the visit.  He agreed that was an acceptable compromise.    He also had questions regarding when he would receive his stipend/compensation, recalling prior trials experience with his payment.  Was able to reassure him of an improvement to the process.    He confirmed he would like to participate in the trial.  He would not be able to be 'confined' the week of July 24th.  He would be available for in August.    Will follow up with Mr. Becker on a clinic appointment to review consent form in person, answer any further questions he might have, and then proceed with the study if he would still like to.  Will enter orders for echocardiogram and attempt to schedule on the same day as the clinic/consenting visit for his convenience.      Jacy Fox RN, BSN  Clinical Trials Nurse

## 2022-08-18 ENCOUNTER — TELEPHONE (OUTPATIENT)
Dept: CARDIOLOGY | Facility: CLINIC | Age: 57
End: 2022-08-18

## 2022-08-18 DIAGNOSIS — I25.83 CORONARY ARTERY DISEASE DUE TO LIPID RICH PLAQUE: ICD-10-CM

## 2022-08-18 DIAGNOSIS — I25.10 CORONARY ARTERY DISEASE DUE TO LIPID RICH PLAQUE: ICD-10-CM

## 2022-08-18 DIAGNOSIS — Z00.6 EXAM FOR CLINICAL RESEARCH: ICD-10-CM

## 2022-08-18 DIAGNOSIS — I25.5 ISCHEMIC CARDIOMYOPATHY: Primary | ICD-10-CM

## 2022-08-18 DIAGNOSIS — I50.22 CHRONIC SYSTOLIC HEART FAILURE (H): ICD-10-CM

## 2022-08-18 NOTE — TELEPHONE ENCOUNTER
Called Daniele Becker to continue to discuss potential participation in the [facility] [id number] clinical trial.    9/16/2022  Spoke with Daniele Becker about his continued interest in the [employer] [id number] clinical trial. He remains interested.  Scheduled him for consenting & baseline visit for Thursday 9/29/2022 @ 1430, and with Eliazar Do NP for baseline PE.  Will place order for echo-required for the trial, and schedule.    Jacy Fox RN, BSN  Clinical Trials Nurse

## 2022-09-20 ENCOUNTER — ANCILLARY PROCEDURE (OUTPATIENT)
Dept: CARDIOLOGY | Facility: CLINIC | Age: 57
End: 2022-09-20
Attending: INTERNAL MEDICINE

## 2022-09-20 DIAGNOSIS — I25.5 ISCHEMIC CARDIOMYOPATHY: ICD-10-CM

## 2022-09-20 DIAGNOSIS — Z95.810 ICD (IMPLANTABLE CARDIOVERTER-DEFIBRILLATOR) IN PLACE: ICD-10-CM

## 2022-09-21 LAB
MDC_IDC_EPISODE_DTM: NORMAL
MDC_IDC_EPISODE_DTM: NORMAL
MDC_IDC_EPISODE_DURATION: 15 S
MDC_IDC_EPISODE_ID: NORMAL
MDC_IDC_EPISODE_ID: NORMAL
MDC_IDC_EPISODE_TYPE: NORMAL
MDC_IDC_EPISODE_TYPE: NORMAL
MDC_IDC_EPISODE_VENDOR_TYPE: NORMAL
MDC_IDC_LEAD_IMPLANT_DT: NORMAL
MDC_IDC_LEAD_LOCATION: NORMAL
MDC_IDC_LEAD_LOCATION_DETAIL_1: NORMAL
MDC_IDC_LEAD_MFG: NORMAL
MDC_IDC_LEAD_MODEL: NORMAL
MDC_IDC_LEAD_POLARITY_TYPE: NORMAL
MDC_IDC_LEAD_SERIAL: NORMAL
MDC_IDC_MSMT_BATTERY_DTM: NORMAL
MDC_IDC_MSMT_BATTERY_REMAINING_LONGEVITY: 144 MO
MDC_IDC_MSMT_BATTERY_REMAINING_PERCENTAGE: 100 %
MDC_IDC_MSMT_BATTERY_STATUS: NORMAL
MDC_IDC_MSMT_CAP_CHARGE_DTM: NORMAL
MDC_IDC_MSMT_CAP_CHARGE_DTM: NORMAL
MDC_IDC_MSMT_CAP_CHARGE_ENERGY: 21 J
MDC_IDC_MSMT_CAP_CHARGE_TIME: 10.7 S
MDC_IDC_MSMT_CAP_CHARGE_TIME: 3.8 S
MDC_IDC_MSMT_CAP_CHARGE_TYPE: NORMAL
MDC_IDC_MSMT_CAP_CHARGE_TYPE: NORMAL
MDC_IDC_MSMT_LEADCHNL_RV_IMPEDANCE_VALUE: 377 OHM
MDC_IDC_MSMT_LEADCHNL_RV_PACING_THRESHOLD_AMPLITUDE: 1.5 V
MDC_IDC_MSMT_LEADCHNL_RV_PACING_THRESHOLD_PULSEWIDTH: 0.4 MS
MDC_IDC_PG_IMPLANT_DTM: NORMAL
MDC_IDC_PG_MFG: NORMAL
MDC_IDC_PG_MODEL: NORMAL
MDC_IDC_PG_SERIAL: NORMAL
MDC_IDC_PG_TYPE: NORMAL
MDC_IDC_SESS_CLINIC_NAME: NORMAL
MDC_IDC_SESS_DTM: NORMAL
MDC_IDC_SESS_TYPE: NORMAL
MDC_IDC_SET_BRADY_LOWRATE: 40 {BEATS}/MIN
MDC_IDC_SET_BRADY_MODE: NORMAL
MDC_IDC_SET_LEADCHNL_RV_PACING_AMPLITUDE: 2.3 V
MDC_IDC_SET_LEADCHNL_RV_PACING_POLARITY: NORMAL
MDC_IDC_SET_LEADCHNL_RV_PACING_PULSEWIDTH: 0.4 MS
MDC_IDC_SET_LEADCHNL_RV_SENSING_ADAPTATION_MODE: NORMAL
MDC_IDC_SET_LEADCHNL_RV_SENSING_POLARITY: NORMAL
MDC_IDC_SET_LEADCHNL_RV_SENSING_SENSITIVITY: 0.6 MV
MDC_IDC_SET_ZONE_DETECTION_INTERVAL: 250 MS
MDC_IDC_SET_ZONE_DETECTION_INTERVAL: 324 MS
MDC_IDC_SET_ZONE_TYPE: NORMAL
MDC_IDC_SET_ZONE_TYPE: NORMAL
MDC_IDC_SET_ZONE_VENDOR_TYPE: NORMAL
MDC_IDC_SET_ZONE_VENDOR_TYPE: NORMAL
MDC_IDC_STAT_BRADY_DTM_END: NORMAL
MDC_IDC_STAT_BRADY_DTM_START: NORMAL
MDC_IDC_STAT_BRADY_RV_PERCENT_PACED: 0 %
MDC_IDC_STAT_EPISODE_RECENT_COUNT: 0
MDC_IDC_STAT_EPISODE_RECENT_COUNT: 2
MDC_IDC_STAT_EPISODE_RECENT_COUNT_DTM_END: NORMAL
MDC_IDC_STAT_EPISODE_RECENT_COUNT_DTM_START: NORMAL
MDC_IDC_STAT_EPISODE_TYPE: NORMAL
MDC_IDC_STAT_EPISODE_VENDOR_TYPE: NORMAL
MDC_IDC_STAT_TACHYTHERAPY_ATP_DELIVERED_RECENT: 0
MDC_IDC_STAT_TACHYTHERAPY_ATP_DELIVERED_TOTAL: 0
MDC_IDC_STAT_TACHYTHERAPY_RECENT_DTM_END: NORMAL
MDC_IDC_STAT_TACHYTHERAPY_RECENT_DTM_START: NORMAL
MDC_IDC_STAT_TACHYTHERAPY_SHOCKS_ABORTED_RECENT: 0
MDC_IDC_STAT_TACHYTHERAPY_SHOCKS_ABORTED_TOTAL: 0
MDC_IDC_STAT_TACHYTHERAPY_SHOCKS_DELIVERED_RECENT: 0
MDC_IDC_STAT_TACHYTHERAPY_SHOCKS_DELIVERED_TOTAL: 1
MDC_IDC_STAT_TACHYTHERAPY_TOTAL_DTM_END: NORMAL
MDC_IDC_STAT_TACHYTHERAPY_TOTAL_DTM_START: NORMAL

## 2022-09-21 PROCEDURE — 93296 REM INTERROG EVL PM/IDS: CPT | Performed by: INTERNAL MEDICINE

## 2022-09-21 PROCEDURE — 93295 DEV INTERROG REMOTE 1/2/MLT: CPT | Performed by: INTERNAL MEDICINE

## 2022-09-28 NOTE — PROGRESS NOTES
"      Assessment/Recommendations   Assessment:   Patient is here for, \"[employer] [id number]\" clinical study screening visit.    1.  Ischemic cardiomyopathy with systolic dysfunction with LVEF of 21% with status post ICD in July 2018: Patient is well compensated with no evidence of retention on assessment.  On heart failure regimen includes  -Beta-blocker therapy with carvedilol 25 mg twice a day  -ACE inhibitor with lisinopril 7.5 mg daily  -On furosemide 10 mg as needed    2.  Hypertension: Blood pressure stable.    3.  Pure hypercholesterolemia: On atorvastatin.    4. Coronary Artery Disease: On ASA 81 mg daily. Denies chest pain.    Plan:  No changes to his medications today. He will continue to follow-up with research per protocol for [employer] study.     History of Present Illness/Subjective    Mr. Daniele Becker is a 57 year old male with with significant past medical history of ischemic cardiomyopathy with systolic dysfunction with status post ICD in June 2018, coronary artery disease with known  of of proximal LAD with no viability, hypertension, and dyslipidemia.    Patient was last seen by Dr. Israel in May 2022.  Note reviewed.    Today,  Daniele reports that he has been doing well from cardiac standpoint since his last visit with Dr. Israel.  He denies chest pain, shortness of breath, PND, orthopnea, abdominal bloating, lightheadedness, dizziness, heart palpitation or lower extremity edema.  He does not report any bleeding complications.     Physical Examination Review of Systems   /80 (BP Location: Left arm, Patient Position: Sitting, Cuff Size: Adult Regular)   Pulse 61   Temp 98.1  F (36.7  C) (Oral)   Resp 12   SpO2 98%   There is no height or weight on file to calculate BMI.  Wt Readings from Last 3 Encounters:   05/16/22 88.5 kg (195 lb)   04/05/21 100.2 kg (221 lb)   04/24/20 92.5 kg (204 lb)       General Appearance:   no distress, normal body habitus   ENT/Mouth: membranes " moist, no oral lesions or bleeding gums.      EYES:  no scleral icterus, normal conjunctivae   Neck: no carotid bruits or thyromegaly   Chest/Lungs:   lungs are clear to auscultation, no rales or wheezing,  equal chest wall expansion    Cardiovascular:    Normal first and second heart sounds with no murmurs, rubs, or gallops; the carotid, radial and posterior tibial pulses are intact, Jugular venous pressure flat with no edema bilaterally    Abdomen:  no organomegaly, masses, bruits, or tenderness; bowel sounds are present   Extremities: no cyanosis or clubbing   Skin: no xanthelasma, warm.    Neurologic: normal  bilateral, no tremors     Psychiatric: alert and oriented x3, calm                                               Medical History  Surgical History Family History Social History   Past Medical History:   Diagnosis Date     Chronic systolic heart failure (H) 05/23/2018     Coronary artery disease 2018     Dyslipidemia 2018     Hypertension 2018     Ischemic cardiomyopathy 04/06/2018    LVEF 17% echo May 2018 (anterior, apical, inferior apical akinesis) Coronary angio May 5, 2018:  prox LAD, R >L collaterals attempted PCI (unsuccessful) Cardiac MRI April 20, 2018: LVEF 22% transmural MI (nonviable) apical, distal anterior/ anterolateral/ septal/ basal walls     Tobacco dependence in remission 2016    Past Surgical History:   Procedure Laterality Date     ARTHROSCOPY KNEE       CV CORONARY ANGIOGRAM N/A 4/5/2018    Procedure: Coronary Angiogram;  Surgeon: Wilber Darby MD;  Location: [facility];  Service:      CV LEFT HEART CATHETERIZATION WITHOUT LEFT VENTRICULOGRAM Left 4/5/2018    Procedure: Left Heart Catheterization Without Left Ventriculogram;  Surgeon: Wilber Darby MD;  Location: [facility] Lab;  Service:      CYST REMOVAL      neck     EP ICD INSERT N/A 7/6/2018    Procedure: EP ICD Insert;  Surgeon: Hany Fernández MD;  Location: [facility];  Service:      Family History   Problem Relation Age of Onset     Huber Father         ' at 76 from rupture of vessel'    Social History     Socioeconomic History     Marital status:      Spouse name: Not on file     Number of children: Not on file     Years of education: Not on file     Highest education level: Not on file   Occupational History     Not on file   Tobacco Use     Smoking status: Former Smoker     Types: Cigarettes     Quit date: 2016     Years since quittin.4     Smokeless tobacco: Former User     Types: Snuff   Substance and Sexual Activity     Alcohol use: Yes     Comment: Alcoholic Drinks/day: last drink 2 weeks ago     Drug use: No     Sexual activity: Not on file   Other Topics Concern     Not on file   Social History Narrative    Works at [employer].  and lives with wife.   Has grandchildren and wife babysits them.    Eun Castro MD  2018    No medical insurance yet and this has been a barrier in getting adequate cares.  Unable to get a LifeVest as they have to  pay out of pocket.  Followed by  at [facility].    Eun Castro MD  2018             Social Determinants of Health     Financial Resource Strain: Not on file   Food Insecurity: Not on file   Transportation Needs: Not on file   Physical Activity: Not on file   Stress: Not on file   Social Connections: Not on file   Intimate Partner Violence: Not on file   Housing Stability: Not on file          Medications  Allergies   Current Outpatient Medications   Medication Sig Dispense Refill     aspirin 81 mg chewable tablet [ASPIRIN 81 MG CHEWABLE TABLET] Chew 1 tablet (81 mg total) daily.  0     atorvastatin (LIPITOR) 80 MG tablet Take 1 tablet (80 mg) by mouth daily 90 tablet 4     carvedilol (COREG) 25 MG tablet Take 1 tablet (25 mg) by mouth 2 times daily (with meals) 180 tablet 4     furosemide (LASIX) 20 MG tablet [FUROSEMIDE (LASIX) 20 MG TABLET] Take 0.5 tablets (10 mg total) by mouth as  needed (For fluid retention, sob). 30 tablet 1     lisinopril (ZESTRIL) 2.5 MG tablet Take 1 tablet (2.5 mg) by mouth daily 90 tablet 4     lisinopril (ZESTRIL) 5 MG tablet Take 1 tablet (5 mg) by mouth daily 90 tablet 4     naproxen sodium 220 MG capsule Take 220 mg by mouth as needed      Allergies   Allergen Reactions     No Known Drug Allergies Unknown     Orange Flavor [Flavoring Agent] Rash     Lopez  1st Noted as child         Lab Results    Chemistry/lipid CBC Cardiac Enzymes/BNP/TSH/INR   Lab Results   Component Value Date    CHOL 157 04/06/2018    HDL 27 (L) 04/06/2018    TRIG 86 04/06/2018    BUN 18 01/29/2019     01/29/2019    CO2 26 01/29/2019    Lab Results   Component Value Date    WBC 7.6 02/05/2019    HGB 10.5 (L) 02/05/2019    HCT 33.4 (L) 02/05/2019    MCV 88 02/05/2019     02/05/2019    Lab Results   Component Value Date    CKMB 3 04/05/2018    TROPONINI 0.06 01/29/2019     (H) 01/15/2019    INR 1.08 04/05/2018        16 minutes spent on the date of encounter doing chart review, review of test results, interpretation with above tests, patient visit and documentation.        This note has been dictated using voice recognition software. Any grammatical, typographical, or context distortions are unintentional and inherent to the software

## 2022-09-29 ENCOUNTER — OFFICE VISIT (OUTPATIENT)
Dept: CARDIOLOGY | Facility: CLINIC | Age: 57
End: 2022-09-29

## 2022-09-29 VITALS
HEART RATE: 61 BPM | SYSTOLIC BLOOD PRESSURE: 133 MMHG | OXYGEN SATURATION: 98 % | RESPIRATION RATE: 12 BRPM | TEMPERATURE: 98.1 F | DIASTOLIC BLOOD PRESSURE: 80 MMHG

## 2022-09-29 VITALS
WEIGHT: 192.8 LBS | BODY MASS INDEX: 29.22 KG/M2 | RESPIRATION RATE: 12 BRPM | TEMPERATURE: 98.1 F | OXYGEN SATURATION: 98 % | DIASTOLIC BLOOD PRESSURE: 80 MMHG | HEART RATE: 61 BPM | HEIGHT: 68 IN | SYSTOLIC BLOOD PRESSURE: 133 MMHG

## 2022-09-29 DIAGNOSIS — I25.5 ISCHEMIC CARDIOMYOPATHY: ICD-10-CM

## 2022-09-29 DIAGNOSIS — I50.22 CHRONIC SYSTOLIC HEART FAILURE (H): ICD-10-CM

## 2022-09-29 DIAGNOSIS — Z95.810 ICD (IMPLANTABLE CARDIOVERTER-DEFIBRILLATOR), SINGLE, IN SITU: ICD-10-CM

## 2022-09-29 DIAGNOSIS — E78.00 PURE HYPERCHOLESTEROLEMIA: ICD-10-CM

## 2022-09-29 DIAGNOSIS — Z00.6 EXAMINATION OF PARTICIPANT OR CONTROL IN CLINICAL RESEARCH: Primary | ICD-10-CM

## 2022-09-29 DIAGNOSIS — I50.22 CHRONIC SYSTOLIC HEART FAILURE (H): Primary | ICD-10-CM

## 2022-09-29 DIAGNOSIS — I25.10 CORONARY ARTERY DISEASE INVOLVING NATIVE CORONARY ARTERY OF NATIVE HEART WITHOUT ANGINA PECTORIS: ICD-10-CM

## 2022-09-29 DIAGNOSIS — I10 BENIGN ESSENTIAL HYPERTENSION: ICD-10-CM

## 2022-09-29 LAB — ETHANOL UR QL SCN: NORMAL

## 2022-09-29 PROCEDURE — 99207 PR NO CHARGE-RESEARCH SERVICE: CPT

## 2022-09-29 PROCEDURE — 93000 ELECTROCARDIOGRAM COMPLETE: CPT | Performed by: GENERAL ACUTE CARE HOSPITAL

## 2022-09-29 PROCEDURE — 99212 OFFICE O/P EST SF 10 MIN: CPT | Performed by: NURSE PRACTITIONER

## 2022-09-29 PROCEDURE — 80320 DRUG SCREEN QUANTALCOHOLS: CPT

## 2022-09-29 NOTE — LETTER
9/29/2022    Physician No Ref-Primary  No address on file    RE: Daniele Becker       Dear Colleague,     I had the [facility] of seeing [facility].    Study description: [id number] is a randomized, participant and investigator blinded, sponsor open-label, placebo-controlled, single dose study to investigate the safety and tolerability of [id number] in heart failure participants with reduced ejection fraction    Subject seen in clinic today for study screening visit.      Consent process:     Research nurse met with subject to discuss participation in the above noted study.    The study discussion included the following:     Study purpose    Qualifications for participation    Length of study participation    Study procedures    Risks and side effects    Benefits (if any)    Voluntary nature of participation    Alternatives to participation    Confidentiality of records    Financial considerations     Subject asked questions and agreed that he received answers that satisfied him.    Consent form (version 04 Mar 2022 date  19 May 2022) signed.   Pharmacogenetic consent Yes [x]   No   []  Pregnancy consent Yes  []   No   [x]  Additional data and biological samples Yes  [x]  No  []    A signed copy was offered to the subject & forwarded to medical records.    No study procedures were done prior to obtaining informed consent.   Jacy Fox RN        Inclusion & Exclusion criteria reviewed again with subject and they remain eligible to participate pending lab results    Subject seen by provider Dipak for study related physical exam.           Vitals:    09/29/22 1519 09/29/22 1522 09/29/22 1523   BP: 126/78 125/77 133/80   BP Location: Left arm Left arm Left arm   Patient Position: Sitting Sitting Sitting   Cuff Size:  Adult Regular Adult Regular   Pulse: 61 59 61   Resp:   12   Temp: 98.1  F (36.7  C)     TempSrc: Oral     SpO2: 98% 98% 98%   Weight: 87.5 kg (192 lb 12.8  "oz)     Height: 1.715 m (5' 7.5\")         BMI 29.75       Demographics     [x] Not  or   []  or    [] Not Reported    [] Unknown         Race:   [] or    []   []Black or    [] or Other   [x]White   []Other, specify      Current Outpatient Medications:      aspirin 81 mg chewable tablet, [ASPIRIN 81 MG CHEWABLE TABLET] Chew 1 tablet (81 mg total) daily., Disp: , Rfl: 0     atorvastatin (LIPITOR) 80 MG tablet, Take 1 tablet (80 mg) by mouth daily, Disp: 90 tablet, Rfl: 4     carvedilol (COREG) 25 MG tablet, Take 1 tablet (25 mg) by mouth 2 times daily (with meals), Disp: 180 tablet, Rfl: 4     furosemide (LASIX) 20 MG tablet, [FUROSEMIDE (LASIX) 20 MG TABLET] Take 0.5 tablets (10 mg total) by mouth as needed (For fluid retention, sob)., Disp: 30 tablet, Rfl: 1     lisinopril (ZESTRIL) 2.5 MG tablet, Take 1 tablet (2.5 mg) by mouth daily, Disp: 90 tablet, Rfl: 4     lisinopril (ZESTRIL) 5 MG tablet, Take 1 tablet (5 mg) by mouth daily, Disp: 90 tablet, Rfl: 4     naproxen sodium 220 MG capsule, Take 220 mg by mouth as needed, Disp: , Rfl:    Past Medical History:   Diagnosis Date     Chronic systolic heart failure (H) 05/23/2018     Coronary artery disease 2018     Dyslipidemia 2018     Hypertension 2018     Ischemic cardiomyopathy 04/06/2018    LVEF 17% echo May 2018 (anterior, apical, inferior apical akinesis) Coronary angio May 5, 2018:  prox LAD, R >L collaterals attempted PCI (unsuccessful) Cardiac MRI April 20, 2018: LVEF 22% transmural MI (nonviable) apical, distal anterior/ anterolateral/ septal/ basal walls     Tobacco dependence in remission 2016        Alcohol Test and Drug Screen done  Yes [x]   No   []  Echo scheduled for tomorrow  Serum pregnancy test done Results  or [x] N/A-male  FSH done No    [facility] Labs done at 16.01  Urinalysis obtained 16.05    NYHA Classification  II    ECG done  See " separate page for Pam's formula calculations     Any AE/JOSEE. None reported    Next visit to be scheduled     Jacy Fox RN        Thank you for allowing me to participate in the care of your patient.      Sincerely,     Jacy Fox RN     [facility]  cc:   No referring provider defined for this encounter.

## 2022-09-29 NOTE — PATIENT INSTRUCTIONS
Daniele Becker,    It was a pleasure to see you today at the [facility].     My recommendations after this visit include:    - No medications changes made today    - Please call [phone], if you have any questions or concerns    Eliazar Do, CNP

## 2022-09-29 NOTE — LETTER
Date:September 30, 2022      Provider requested that no letter be sent. Do not send.       [facility]

## 2022-09-29 NOTE — PROGRESS NOTES
"  Study description: [id number] is a randomized, participant and investigator blinded, sponsor open-label, placebo-controlled, single dose study to investigate the safety and tolerability of XSQ155 in heart failure participants with reduced ejection fraction    Subject seen in clinic today for study screening visit.      Consent process:     Research nurse met with subject to discuss participation in the above noted study.    The study discussion included the following:     Study purpose    Qualifications for participation    Length of study participation    Study procedures    Risks and side effects    Benefits (if any)    Voluntary nature of participation    Alternatives to participation    Confidentiality of records    Financial considerations     Subject asked questions and agreed that he received answers that satisfied him.    Consent form (version 04 Mar 2022 date  19 May 2022) signed.   Pharmacogenetic consent Yes [x]   No   []  Pregnancy consent Yes  []   No   [x]  Additional data and biological samples Yes  [x]  No  []    A signed copy was offered to the subject & forwarded to medical records.    No study procedures were done prior to obtaining informed consent.   Jacy Fox RN        Inclusion & Exclusion criteria reviewed again with subject and they remain eligible to participate pending lab results    Subject seen by provider iDpak for study related physical exam.           Vitals:    09/29/22 1519 09/29/22 1522 09/29/22 1523   BP: 126/78 125/77 133/80   BP Location: Left arm Left arm Left arm   Patient Position: Sitting Sitting Sitting   Cuff Size:  Adult Regular Adult Regular   Pulse: 61 59 61   Resp:   12   Temp: 98.1  F (36.7  C)     TempSrc: Oral     SpO2: 98% 98% 98%   Weight: 87.5 kg (192 lb 12.8 oz)     Height: 1.715 m (5' 7.5\")         BMI 29.92       Demographics     [x] Not  or   []  or    [] Not Reported    [] Unknown         Race:   [] or "    []   []Black or    [] or Other   [x]White   []Other, specify      Current Outpatient Medications:      aspirin 81 mg chewable tablet, [ASPIRIN 81 MG CHEWABLE TABLET] Chew 1 tablet (81 mg total) daily., Disp: , Rfl: 0     atorvastatin (LIPITOR) 80 MG tablet, Take 1 tablet (80 mg) by mouth daily, Disp: 90 tablet, Rfl: 4     carvedilol (COREG) 25 MG tablet, Take 1 tablet (25 mg) by mouth 2 times daily (with meals), Disp: 180 tablet, Rfl: 4     furosemide (LASIX) 20 MG tablet, [FUROSEMIDE (LASIX) 20 MG TABLET] Take 0.5 tablets (10 mg total) by mouth as needed (For fluid retention, sob)., Disp: 30 tablet, Rfl: 1     lisinopril (ZESTRIL) 2.5 MG tablet, Take 1 tablet (2.5 mg) by mouth daily, Disp: 90 tablet, Rfl: 4     lisinopril (ZESTRIL) 5 MG tablet, Take 1 tablet (5 mg) by mouth daily, Disp: 90 tablet, Rfl: 4     naproxen sodium 220 MG capsule, Take 220 mg by mouth as needed, Disp: , Rfl:    Past Medical History:   Diagnosis Date     Chronic systolic heart failure (H) 05/23/2018     Coronary artery disease 2018     Dyslipidemia 2018     Hypertension 2018     Ischemic cardiomyopathy 04/06/2018    LVEF 17% echo May 2018 (anterior, apical, inferior apical akinesis) Coronary angio May 5, 2018:  prox LAD, R >L collaterals attempted PCI (unsuccessful) Cardiac MRI April 20, 2018: LVEF 22% transmural MI (nonviable) apical, distal anterior/ anterolateral/ septal/ basal walls     Tobacco dependence in remission 2016        Alcohol Test and Drug Screen done  Yes [x]   No   []  Echo scheduled for tomorrow  Serum pregnancy test done Results  or [x] N/A-male  FSH done No    Central Labs done at 16.01  Urinalysis obtained 16.05    NYHA Classification  II    12 lead ECG obtained    Any AE/JOSEE. None reported    Next visit to be scheduled     Jacy Fox RN

## 2022-09-29 NOTE — LETTER
"9/29/2022    Physician No Ref-Primary  No address on file    RE: Daniele Becker       Dear Colleague,     I had the pleasure of seeing Daniele Becker in the [facility].        Assessment/Recommendations   Assessment:   Patient is here for, \"[employer] [id number]\" clinical study screening visit.    1.  Ischemic cardiomyopathy with systolic dysfunction with LVEF of 21% with status post ICD in July 2018: Patient is well compensated with no evidence of retention on assessment.  On heart failure regimen includes  -Beta-blocker therapy with carvedilol 25 mg twice a day  -ACE inhibitor with lisinopril 7.5 mg daily  -On furosemide 10 mg as needed    2.  Hypertension: Blood pressure stable.    3.  Pure hypercholesterolemia: On atorvastatin.    4. Coronary Artery Disease: On ASA 81 mg daily. Denies chest pain.    Plan:  No changes to his medications today. He will continue to follow-up with research per protocol for [employer] study.     History of Present Illness/Subjective    Mr. Daniele Becker is a 57 year old male with with significant past medical history of ischemic cardiomyopathy with systolic dysfunction with status post ICD in June 2018, coronary artery disease with known  of of proximal LAD with no viability, hypertension, and dyslipidemia.    Patient was last seen by Dr. Israel in May 2022.  Note reviewed.    Today,  Daniele reports that he has been doing well from cardiac standpoint since his last visit with Dr. Israel.  He denies chest pain, shortness of breath, PND, orthopnea, abdominal bloating, lightheadedness, dizziness, heart palpitation or lower extremity edema.  He does not report any bleeding complications.     Physical Examination Review of Systems   /80 (BP Location: Left arm, Patient Position: Sitting, Cuff Size: Adult Regular)   Pulse 61   Temp 98.1  F (36.7  C) (Oral)   Resp 12   SpO2 98%   There is no height or weight on file to calculate BMI.  Wt Readings from " Last 3 Encounters:   05/16/22 88.5 kg (195 lb)   04/05/21 100.2 kg (221 lb)   04/24/20 92.5 kg (204 lb)       General Appearance:   no distress, normal body habitus   ENT/Mouth: membranes moist, no oral lesions or bleeding gums.      EYES:  no scleral icterus, normal conjunctivae   Neck: no carotid bruits or thyromegaly   Chest/Lungs:   lungs are clear to auscultation, no rales or wheezing,  equal chest wall expansion    Cardiovascular:    Normal first and second heart sounds with no murmurs, rubs, or gallops; the carotid, radial and posterior tibial pulses are intact, Jugular venous pressure flat with no edema bilaterally    Abdomen:  no organomegaly, masses, bruits, or tenderness; bowel sounds are present   Extremities: no cyanosis or clubbing   Skin: no xanthelasma, warm.    Neurologic: normal  bilateral, no tremors     Psychiatric: alert and oriented x3, calm                                               Medical History  Surgical History Family History Social History   Past Medical History:   Diagnosis Date     Chronic systolic heart failure (H) 05/23/2018     Coronary artery disease 2018     Dyslipidemia 2018     Hypertension 2018     Ischemic cardiomyopathy 04/06/2018    LVEF 17% echo May 2018 (anterior, apical, inferior apical akinesis) Coronary angio May 5, 2018:  prox LAD, R >L collaterals attempted PCI (unsuccessful) Cardiac MRI April 20, 2018: LVEF 22% transmural MI (nonviable) apical, distal anterior/ anterolateral/ septal/ basal walls     Tobacco dependence in remission 2016    Past Surgical History:   Procedure Laterality Date     ARTHROSCOPY KNEE       CV CORONARY ANGIOGRAM N/A 4/5/2018    Procedure: Coronary Angiogram;  Surgeon: Wilber Darby MD;  Location: [facility];  Service:      CV LEFT HEART CATHETERIZATION WITHOUT LEFT VENTRICULOGRAM Left 4/5/2018    Procedure: Left Heart Catheterization Without Left Ventriculogram;  Surgeon: Wilber Darby MD;  Location: [facility]  Cath Lab;  Service:      CYST REMOVAL      neck     EP ICD INSERT N/A 2018    Procedure: EP ICD Insert;  Surgeon: Hany Fernández MD;  Location: [facility];  Service:     Family History   Problem Relation Age of Onset     Huber Father         ' at 76 from rupture of vessel'    Social History     Socioeconomic History     Marital status:      Spouse name: Not on file     Number of children: Not on file     Years of education: Not on file     Highest education level: Not on file   Occupational History     Not on file   Tobacco Use     Smoking status: Former Smoker     Types: Cigarettes     Quit date: 2016     Years since quittin.4     Smokeless tobacco: Former User     Types: Snuff   Substance and Sexual Activity     Alcohol use: Yes     Comment: Alcoholic Drinks/day: last drink 2 weeks ago     Drug use: No     Sexual activity: Not on file   Other Topics Concern     Not on file   Social History Narrative    Works at [employer].  and lives with wife.   Has grandchildren and wife babysits them.    Eun Castro MD  2018    No medical insurance yet and this has been a barrier in getting adequate cares.  Unable to get a LifeVest as they have to  pay out of pocket.  Followed by  at [facility].    Eun Castro MD  2018             Social Determinants of Health     Financial Resource Strain: Not on file   Food Insecurity: Not on file   Transportation Needs: Not on file   Physical Activity: Not on file   Stress: Not on file   Social Connections: Not on file   Intimate Partner Violence: Not on file   Housing Stability: Not on file          Medications  Allergies   Current Outpatient Medications   Medication Sig Dispense Refill     aspirin 81 mg chewable tablet [ASPIRIN 81 MG CHEWABLE TABLET] Chew 1 tablet (81 mg total) daily.  0     atorvastatin (LIPITOR) 80 MG tablet Take 1 tablet (80 mg) by mouth daily 90 tablet 4     carvedilol (COREG) 25 MG  tablet Take 1 tablet (25 mg) by mouth 2 times daily (with meals) 180 tablet 4     furosemide (LASIX) 20 MG tablet [FUROSEMIDE (LASIX) 20 MG TABLET] Take 0.5 tablets (10 mg total) by mouth as needed (For fluid retention, sob). 30 tablet 1     lisinopril (ZESTRIL) 2.5 MG tablet Take 1 tablet (2.5 mg) by mouth daily 90 tablet 4     lisinopril (ZESTRIL) 5 MG tablet Take 1 tablet (5 mg) by mouth daily 90 tablet 4     naproxen sodium 220 MG capsule Take 220 mg by mouth as needed      Allergies   Allergen Reactions     No Known Drug Allergies Unknown     Orange Flavor [Flavoring Agent] Rash     Lopez  1st Noted as child         Lab Results    Chemistry/lipid CBC Cardiac Enzymes/BNP/TSH/INR   Lab Results   Component Value Date    CHOL 157 04/06/2018    HDL 27 (L) 04/06/2018    TRIG 86 04/06/2018    BUN 18 01/29/2019     01/29/2019    CO2 26 01/29/2019    Lab Results   Component Value Date    WBC 7.6 02/05/2019    HGB 10.5 (L) 02/05/2019    HCT 33.4 (L) 02/05/2019    MCV 88 02/05/2019     02/05/2019    Lab Results   Component Value Date    CKMB 3 04/05/2018    TROPONINI 0.06 01/29/2019     (H) 01/15/2019    INR 1.08 04/05/2018        16 minutes spent on the date of encounter doing chart review, review of test results, interpretation with above tests, patient visit and documentation.        This note has been dictated using voice recognition software. Any grammatical, typographical, or context distortions are unintentional and inherent to the software          Thank you for allowing me to participate in the care of your patient.      Sincerely,     DANIAL Herrera [facility]  cc:   No referring provider defined for this encounter.

## 2022-09-29 NOTE — LETTER
Date:September 30, 2022      Provider requested that no letter be sent. Do not send.       [facility]

## 2022-09-30 ENCOUNTER — HOSPITAL ENCOUNTER (OUTPATIENT)
Dept: CARDIOLOGY | Facility: CLINIC | Age: 57
Discharge: HOME | End: 2022-09-30
Attending: INTERNAL MEDICINE | Admitting: INTERNAL MEDICINE

## 2022-09-30 DIAGNOSIS — I25.5 ISCHEMIC CARDIOMYOPATHY: ICD-10-CM

## 2022-09-30 DIAGNOSIS — I25.10 CORONARY ARTERY DISEASE DUE TO LIPID RICH PLAQUE: ICD-10-CM

## 2022-09-30 DIAGNOSIS — I50.22 CHRONIC SYSTOLIC HEART FAILURE (H): ICD-10-CM

## 2022-09-30 DIAGNOSIS — Z00.6 EXAM FOR CLINICAL RESEARCH: ICD-10-CM

## 2022-09-30 DIAGNOSIS — I25.83 CORONARY ARTERY DISEASE DUE TO LIPID RICH PLAQUE: ICD-10-CM

## 2022-09-30 LAB
ATRIAL RATE - MUSE: 59 BPM
DIASTOLIC BLOOD PRESSURE - MUSE: NORMAL MMHG
INTERPRETATION ECG - MUSE: NORMAL
LVEF ECHO: NORMAL
P AXIS - MUSE: 40 DEGREES
PR INTERVAL - MUSE: 184 MS
QRS DURATION - MUSE: 88 MS
QT - MUSE: 404 MS
QTC - MUSE: 399 MS
R AXIS - MUSE: -74 DEGREES
SYSTOLIC BLOOD PRESSURE - MUSE: NORMAL MMHG
T AXIS - MUSE: 5 DEGREES
VENTRICULAR RATE- MUSE: 59 BPM

## 2022-09-30 PROCEDURE — 255N000002 HC RX 255 OP 636: Performed by: INTERNAL MEDICINE

## 2022-09-30 PROCEDURE — 93306 TTE W/DOPPLER COMPLETE: CPT | Mod: 26 | Performed by: INTERNAL MEDICINE

## 2022-09-30 RX ADMIN — PERFLUTREN 2.5 ML: 6.52 INJECTION, SUSPENSION INTRAVENOUS at 15:25

## 2022-10-04 ENCOUNTER — DOCUMENTATION ONLY (OUTPATIENT)
Dept: CARDIOLOGY | Facility: CLINIC | Age: 57
End: 2022-10-04

## 2022-10-04 PROCEDURE — 99207 PR NO CHARGE-RESEARCH SERVICE: CPT

## 2022-10-04 NOTE — PROGRESS NOTES
AUGUSTINA [id number]  Protocol Version 00, 16 Dec 2021    Inclusion Criteria   Participants eligible for inclusion in this study must meet all of the following Inclusion criteria     Inclusion Criteria All must be Yes   Men or women 18-70 years of age at screening Yes   AT Screening, NHYA Functional class II-III heart failure by physician assessment Yes   LVEF </= 45% documented at screening or from medical documentation within 6 months of screening without an interim cardiovascular event Yes   Treatment with a stable dose of an ACE Inhibitor or an ARB for at least 4 weeks before screening visit; and treatment with a stable dose of a betablocker for at least 4 weeks prior to the screening visit, unless contraindicated or not tolerated. Yes   At screening, seated -180 mmHg inclusive, and resting HR </= 90 beats per minute Yes   Written informed consent must be obtained before any assessment is performed Yes             Exclusion Criteria   Participants meeting any of the following criteria are not eligible for inclusion in this study    Exclusion Criteria: Patients who meet any of the following criteria will be excluded from the study.  All must be No   Acute decompensated heart failure (new onset of heart failure or exacerbation of chronic heart failure manifested by signs and symptoms that may require intravenous therapy), acute coronary syndrome, stroke, transient ischemic attack, cardiac, carotid, or other major cardiovascular surgery, PCI. Or carotid angioplasty within the 6 months prior to screening. No   At screening, history of heart transplant or on a transplant list or with LV assistance device No   AT screening, history of heart failure due to infiltrative cardiomyopathy (e.g., sarcoidosis, amyloidosis), active myocarditis, constrictive pericarditis, cardiac tamponade, known genetic hypertrophic cardiomyopathy or obstructive hypertrophic cardiomyopathy, arrhythmogenic right ventricular  cardiomyopathy/dysplasia, or hemodynamically relevant congenital heart disease. No   Diagnosis of peripartum- or chemotherapy-induced cardiomyopathy within the 12 months prior to screening. No   At screening. Prior diagnosis of hemodynamically significant (moderate-severe or severe) mitral and/or aortic valve disease or subvalvular aortic stenosis, except mitral regurgitation secondary to LV dilation. Participants with corrected mitral or aortic valve disease are allowed in the study No   Coronary or carotid artery disease likely to require surgical or percutaneous intervention within 6 months after screening No   Implantation of a CRT device within 3 months prior to screening or intent to implant a CRT during the study No   Documented untreated ventricular arrhythmia with syncopal episodes within the 3 months prior to screening No   At screening, history of paroxysmal atrial fibrillation/flutter episode in the past 12 months. Participants with hemodynamically stable permanent/persistent atrial fibrillation/ flutter are allowed in the study. No   Symptomatic bradycardia or second- or third-degree atrioventricular block without a pacemaker at screening No   History of symptomatic hypotension or orthostatic syncope within 1 month prior to screening (including participant reported episodes). No   History of severe pulmonary disease (e.g. COPD) requiring chronic supplemental oxygen therapy or pulmonary hypertension requiring pharmacology treatment at screening No   Body mass index (BMI) >35 kg/m2 at screening No   Estimated glomerular filtration (eGFR) calculated according to the 4-variable MDRD study equation <45 ml/min/1.73 m2 at screening No   Serum potassium >5.2 mmol/L at screening No   At screening, symptomatic anemia or hemoglobin <10g/dl No   Donation or loss of 450 ml of more of blood within eight weeks prior to dosing, or longer if required by local regulation. No   Evidence of active hepatic disease as  determined by any of the following at screening  SGOT (AST) or SGPT (ALT) values exceeding 3x the upper limit of normal.  Total Bilirubin >1.5 mg/dl No   Treatment with Sacubitril/Valsartan currently or within 2 weeks from screening No   At screening, participants with any change in the dose of their oral diuretic within the last 2 weeks No   History of hypersensitivity or allergy to any of the study drugs its excipients or drugs of similar chemical classes, at screening. No   Known history of angioedema at screening No   Active malignancy or other non-cardiac condition limiting life expectancy to under 1 year, per physician judgement at screening. No   Use of other investigational drugs within 5 half-lives of enrollment, or until the expected pharmacodynamic effect has returned to baseline, whichever is longer; or longer if required by local regulations. No   At screening, history of drug abuse or unhealthy alcohol use within the 12 months prior to dosing, or evidence of such abuse as indicated by the laboratory assays conducted during screening. Unhealthy alcohol use is defined as a history of, current alcohol misuse/abuse, defined as five or more drinks on the same occasion on each of 5 or more days in the past 30 days. No   COVID-19 vaccination in the progress (plan to receive first or second vaccine dose during the study period) at screening. No   Women of child-bearing potential, defined as all women physiologically capable of becoming pregnant, unless they are using highly effective methods of contraception during dosing and for 91 days after stopping investigational drug. Highly effective contraception methods include:  Total abstinence from heterosexual intercourse (when this is in line with the preferred and usual lifestyle of the participant). Periodic abstinence (e.g. calendar, ovulation, symptothermal, post-ovulation methods of contraception.  Female sterilization (have had surgical bilateral  oophorectomy with or without hysterectomy). Total hysterectomy or tubal ligation at least six weeks before taking investigational drug. In case of oophorectomy alone, only when the reproductive status of the woman has been confirmed by follow up hormone level assessment  Male sterilization (at least 6 months prior to screening). For female participants on the study the vasectomized male partner should be the sole partner for that participant  Use of oral (estrogen and progesterone), injected or implanted hormonal methods of contraception or placement of an intrauterine device (IUD) or intrauterine system (IUS) or other forms of hormonal contraception that have comparable efficacy (failure rate <1%), for example hormonal vaginal ring or transdermal hormone contraception.  In case of use of oral contraception, women should be stable on the same pill for a minimum of 3 months before taking investigational drug.  If local regulations deviate from the contraception methods listed above and require more extensive measures to prevent pregnancy, local regulations apply and will be described in the ICF  Women are considered post-menopausal and not of the child bearing potential if they have has 12 months of natural (spontaneous) amenorrhea with an appropriate clinical profile (e.g. age appropriate, history of vasomotor symptoms) or have had surgical bilateral oophorectomy (with or without hysterectomy), total hysterectomy or tubal ligation at least six weeks ago. In the case of oophorectomy alone, only when the reproductive status of the woman has been confirmed by follow up hormone level assessment, is she considered not of childbearing potential. Refer to Section 8.4.5 (pregnancy and Assessments of Fertility). Na   Cannabis use for 4 weeks before screening No

## 2022-10-04 NOTE — PROGRESS NOTES
AUGUSTINA [id number]  Protocol Version 00, 16 Dec 2021    Inclusion Criteria   Participants eligible for inclusion in this study must meet all of the following Inclusion criteria     Inclusion Criteria All must be Yes   Men or women 18-70 years of age at screening Yes   AT Screening, NHYA Functional class II-III heart failure by physician assessment Yes   LVEF </= 45% documented at screening or from medical documentation within 6 months of screening without an interim cardiovascular event Yes   Treatment with a stable dose of an ACE Inhibitor or an ARB for at least 4 weeks before screening visit; and treatment with a stable dose of a betablocker for at least 4 weeks prior to the screening visit, unless contraindicated or not tolerated. Yes   At screening, seated -180 mmHg inclusive, and resting HR </= 90 beats per minute Yes   Written informed consent must be obtained before any assessment is performed Yes             Exclusion Criteria   Participants meeting any of the following criteria are not eligible for inclusion in this study    Exclusion Criteria: Patients who meet any of the following criteria will be excluded from the study.  All must be No   Acute decompensated heart failure (new onset of heart failure or exacerbation of chronic heart failure manifested by signs  andsymptoms that may require intravenous therapy), acute coronary syndrome, stroke, transient ischemic attack, cardiac, carotid, or other major cardiovascular surgery, PCI. Or carotid angioplasty within the 6 months prior to screening.  No   At screening, history of heart transplant or on a transplant list or with LV assistance device No   AT screening, history of heart failure due to infiltrative cardiomyopathy (e.g., sarcoidosis, amyloidosis), active myocarditis, constrictive pericarditis, cardiac tamponade, known genetic hypertrophic cardiomyopathy or obstructive hypertrophic cardiomyopathy, arrhythmogenic right ventricular  cardiomyopathy/dysplasia, or hemodynamically relevant congenital heart disease. No   Diagnosis of peripartum- or chemotherapy-induced cardiomyopathy within the 12 months prior to screening. No   At screening. Prior diagnosis of hemodynamically significant (moderate-severe or severe) mitral and/or aortic valve disease or subvalvular aortic stenosis, except mitral regurgitation secondary to LV dilation. Participants with corrected mitral or aortic valve disease are allowed in the study No   Coronary or carotid artery disease likely to require surgical or percutaneous intervention within 6 months after screening No   Implantation of a CRT device within 3 months prior to screening or intent to implant a CRT during the study No   Documented untreated ventricular arrhythmia with syncopal episodes within the 3 months prior to screening No   At screening, history of paroxysmal atrial fibrillation/flutter episode in the past 12 months. Participants with hemodynamically stable permanent/persistent atrial fibrillation/ flutter are allowed in the study. No   Symptomatic bradycardia or second- or third-degree atrioventricular block without a pacemaker at screening No   History of symptomatic hypotension or orthostatic syncope within 1 month prior to screening (including participant reported episodes). No   History of severe pulmonary disease (e.g. COPD) requiring chronic supplemental oxygen therapy or pulmonary hypertension requiring pharmacology treatment at screening No   Body mass index (BMI) >35 kg/m2 at screening No   Estimated glomerular filtration (eGFR) calculated according to the 4-variable MDRD study equation <45 ml/min/1.73 m2 at screening No   Serum potassium >5.2 mmol/L at screening No   At screening, symptomatic anemia or hemoglobin <10g/dl No   Donation or loss of 450 ml of more of blood within eight weeks prior to dosing, or longer if required by local regulation. No   Evidence of active hepatic disease as  determined by any of the following at screening  SGOT (AST) or SGPT (ALT) values exceeding 3x the upper limit of normal.  Total Bilirubin >1.5 mg/dl No   Treatment with Sacubitril/Valsartan currently or within 2 weeks from screening No   At screening, participants with any change in the dose of their oral diuretic within the last 2 weeks No   History of hypersensitivity or allergy to any of the study drugs its excipients or drugs of similar chemical classes, at screening. No   Known history of angioedema at screening No   Active malignancy or other non-cardiac condition limiting life expectancy to under 1 year, per physician judgement at screening. No   Use of other investigational drugs within 5 half-lives of enrollment, or until the expected pharmacodynamic effect has returned to baseline, whichever is longer; or longer if required by local regulations. No   At screening, history of drug abuse or unhealthy alcohol use within the 12 months prior to dosing, or evidence of such abuse as indicated by the laboratory assays conducted during screening. Unhealthy alcohol use is defined as a history of, current alcohol misuse/abuse, defined as five or more drinks on the same occasion on each of 5 or more days in the past 30 days. No   COVID-19 vaccination in the progress (plan to receive first or second vaccine dose during the study period) at screening. No   Women of child-bearing potential, defined as all women physiologically capable of becoming pregnant, unless they are using highly effective methods of contraception during dosing and for 91 days after stopping investigational drug. Highly effective contraception methods include:  Total abstinence from heterosexual intercourse (when this is in line with the preferred and usual lifestyle of the participant). Periodic abstinence (e.g. calendar, ovulation, symptothermal, post-ovulation methods of contraception.  Female sterilization (have had surgical bilateral  oophorectomy with or without hysterectomy). Total hysterectomy or tubal ligation at least six weeks before taking investigational drug. In case of oophorectomy alone, only when the reproductive status of the woman has been confirmed by follow up hormone level assessment  Male sterilization (at least 6 months prior to screening). For female participants on the study the vasectomized male partner should be the sole partner for that participant  Use of oral (estrogen and progesterone), injected or implanted hormonal methods of contraception or placement of an intrauterine device (IUD) or intrauterine system (IUS) or other forms of hormonal contraception that have comparable efficacy (failure rate <1%), for example hormonal vaginal ring or transdermal hormone contraception.  In case of use of oral contraception, women should be stable on the same pill for a minimum of 3 months before taking investigational drug.  If local regulations deviate from the contraception methods listed above and require more extensive measures to prevent pregnancy, local regulations apply and will be described in the ICF  Women are considered post-menopausal and not of the child bearing potential if they have has 12 months of natural (spontaneous) amenorrhea with an appropriate clinical profile (e.g. age appropriate, history of vasomotor symptoms) or have had surgical bilateral oophorectomy (with or without hysterectomy), total hysterectomy or tubal ligation at least six weeks ago. In the case of oophorectomy alone, only when the reproductive status of the woman has been confirmed by follow up hormone level assessment, is she considered not of childbearing potential. Refer to Section 8.4.5 (pregnancy and Assessments of Fertility). No-male   Cannabis use for 4 weeks before screening No   Jacy Fox RN, BSN  Clinical Trials Nurse      Dr. Israel: I have reviewed the participant's medical record and agree that he meets all inclusion  criteria and no exclusion criteria apply.

## 2022-10-07 ENCOUNTER — TRANSFERRED RECORDS (OUTPATIENT)
Dept: CARDIOLOGY | Facility: CLINIC | Age: 57
End: 2022-10-07

## 2022-10-11 ENCOUNTER — TRANSFERRED RECORDS (OUTPATIENT)
Dept: CARDIOLOGY | Facility: CLINIC | Age: 57
End: 2022-10-11

## 2022-10-11 NOTE — PROGRESS NOTES
I have already addressed this but research laboratory data from September 29 reviewed and all not clinically significant including mildly increased chloride of 108, GGT of 113, CK of 327, direct bili of 0.57 and total bili of 1.39.  All once again not clinically significant.  LF

## 2022-10-16 ENCOUNTER — HOSPITAL ENCOUNTER (OUTPATIENT)
Dept: RESEARCH | Facility: CLINIC | Age: 57
Discharge: HOME | End: 2022-10-22
Attending: INTERNAL MEDICINE | Admitting: INTERNAL MEDICINE

## 2022-10-16 DIAGNOSIS — I25.118 CORONARY ARTERY DISEASE OF NATIVE HEART WITH STABLE ANGINA PECTORIS, UNSPECIFIED VESSEL OR LESION TYPE (H): ICD-10-CM

## 2022-10-16 DIAGNOSIS — I21.02 ST ELEVATION MYOCARDIAL INFARCTION INVOLVING LEFT ANTERIOR DESCENDING (LAD) CORONARY ARTERY (H): ICD-10-CM

## 2022-10-16 DIAGNOSIS — Z00.6 EXAMINATION OF PARTICIPANT OR CONTROL IN CLINICAL RESEARCH: ICD-10-CM

## 2022-10-16 DIAGNOSIS — Z95.810 ICD (IMPLANTABLE CARDIOVERTER-DEFIBRILLATOR), SINGLE, IN SITU: ICD-10-CM

## 2022-10-16 DIAGNOSIS — I10 BENIGN ESSENTIAL HYPERTENSION: ICD-10-CM

## 2022-10-16 DIAGNOSIS — I25.5 ISCHEMIC CARDIOMYOPATHY: Primary | ICD-10-CM

## 2022-10-16 DIAGNOSIS — E78.00 PURE HYPERCHOLESTEROLEMIA: ICD-10-CM

## 2022-10-16 DIAGNOSIS — I50.22 CHRONIC SYSTOLIC HEART FAILURE (H): ICD-10-CM

## 2022-10-16 DIAGNOSIS — Z00.6 EXAMINATION OF PARTICIPANT IN CLINICAL TRIAL: ICD-10-CM

## 2022-10-16 ASSESSMENT — ACTIVITIES OF DAILY LIVING (ADL)
ADLS_ACUITY_SCORE: 35

## 2022-10-17 LAB
ALBUMIN SERPL BCG-MCNC: 4 G/DL (ref 3.5–5.2)
ALP SERPL-CCNC: 138 U/L (ref 40–129)
ALT SERPL W P-5'-P-CCNC: 27 U/L (ref 10–50)
ANION GAP SERPL CALCULATED.3IONS-SCNC: 8 MMOL/L (ref 7–15)
AST SERPL W P-5'-P-CCNC: 37 U/L (ref 10–50)
BASOPHILS # BLD AUTO: 0.1 10E3/UL (ref 0–0.2)
BASOPHILS NFR BLD AUTO: 1 %
BILIRUB SERPL-MCNC: 0.9 MG/DL
BUN SERPL-MCNC: 12.4 MG/DL (ref 8–23)
CALCIUM SERPL-MCNC: 9.1 MG/DL (ref 8.6–10)
CHLORIDE SERPL-SCNC: 108 MMOL/L (ref 98–107)
CREAT SERPL-MCNC: 0.82 MG/DL (ref 0.67–1.17)
DEPRECATED HCO3 PLAS-SCNC: 23 MMOL/L (ref 22–29)
EOSINOPHIL # BLD AUTO: 0.7 10E3/UL (ref 0–0.7)
EOSINOPHIL NFR BLD AUTO: 8 %
ERYTHROCYTE [DISTWIDTH] IN BLOOD BY AUTOMATED COUNT: 14.6 % (ref 10–15)
GFR SERPL CREATININE-BSD FRML MDRD: >90 ML/MIN/1.73M2
GLUCOSE SERPL-MCNC: 99 MG/DL (ref 70–99)
HCT VFR BLD AUTO: 44.3 % (ref 40–53)
HGB BLD-MCNC: 14.6 G/DL (ref 13.3–17.7)
IMM GRANULOCYTES # BLD: 0 10E3/UL
IMM GRANULOCYTES NFR BLD: 0 %
LYMPHOCYTES # BLD AUTO: 2.4 10E3/UL (ref 0.8–5.3)
LYMPHOCYTES NFR BLD AUTO: 28 %
MCH RBC QN AUTO: 29 PG (ref 26.5–33)
MCHC RBC AUTO-ENTMCNC: 33 G/DL (ref 31.5–36.5)
MCV RBC AUTO: 88 FL (ref 78–100)
MONOCYTES # BLD AUTO: 0.9 10E3/UL (ref 0–1.3)
MONOCYTES NFR BLD AUTO: 10 %
NEUTROPHILS # BLD AUTO: 4.4 10E3/UL (ref 1.6–8.3)
NEUTROPHILS NFR BLD AUTO: 53 %
NRBC # BLD AUTO: 0 10E3/UL
NRBC BLD AUTO-RTO: 0 /100
PLATELET # BLD AUTO: 244 10E3/UL (ref 150–450)
POTASSIUM SERPL-SCNC: 4.1 MMOL/L (ref 3.4–5.3)
PROT SERPL-MCNC: 6.8 G/DL (ref 6.4–8.3)
RBC # BLD AUTO: 5.03 10E6/UL (ref 4.4–5.9)
SODIUM SERPL-SCNC: 139 MMOL/L (ref 136–145)
WBC # BLD AUTO: 8.4 10E3/UL (ref 4–11)

## 2022-10-17 PROCEDURE — 510N000009 HC RESEARCH FACILITY, PER 15 MIN

## 2022-10-17 PROCEDURE — 510N000023 HC CRU B&I PATIENT CARE, PER 15 MIN

## 2022-10-17 PROCEDURE — 80051 ELECTROLYTE PANEL: CPT | Performed by: INTERNAL MEDICINE

## 2022-10-17 PROCEDURE — 300N000008 HC RESEARCH B&I SPECIMEN PROCESSING, MODERATE

## 2022-10-17 PROCEDURE — 510N000029 HC RESEARCH B&I MEALS, PER MEAL

## 2022-10-17 PROCEDURE — 85025 COMPLETE CBC W/AUTO DIFF WBC: CPT | Performed by: INTERNAL MEDICINE

## 2022-10-17 PROCEDURE — 300N000010 HC RESEARCH B&I SPECIMEN PACKAGING, COMPLEX

## 2022-10-17 PROCEDURE — 36415 COLL VENOUS BLD VENIPUNCTURE: CPT | Performed by: INTERNAL MEDICINE

## 2022-10-17 ASSESSMENT — ACTIVITIES OF DAILY LIVING (ADL)
ADLS_ACUITY_SCORE: 35

## 2022-10-17 NOTE — PROGRESS NOTES
Study description: [id number] is a randomized, participant and investigator blinded, sponsor open-label, placebo-controlled, single dose study to investigate the safety and tolerability of KCX302 in heart failure participants with reduced ejection fraction      Physical Examination  For abnormal findings, please evaluate if the finding is Clinically Significant (by 'CS') or Not Clinically Significant (by 'NCS')    General Appearance  Normal  Head and Neck  Abnormal: wears glasses, NCS  Lungs    {Normal  Cardiovascular  Normal  Abdomen   Normal  Musculoskeletal/Extremities Normal   Lymph Nodes   Normal  Skin    Abnormal: 3 cm comedone located right middle upper back, NCS  Neurological   Normal    NYHA [] I [x] II [] III [] IV    Clinical congestion: [x] Absent [] Present   If present, please provide further details    Vitals:    10/16/22 1800 10/17/22 0820 10/17/22 0822 10/17/22 0824   BP:  130/84 130/85 (!) 133/90   BP Location:  Left arm Left arm Left arm   Patient Position:  Sitting Sitting Sitting   Cuff Size:  Adult Regular Adult Regular Adult Regular   Pulse:  56 54 59   Resp:  14     Temp:  98.1  F (36.7  C)     TempSrc:  Temporal     SpO2:  98%     Weight: 89.1 kg (196 lb 6.9 oz)           Latest Reference Range & Units 10/17/22 08:35   Sodium 136 - 145 mmol/L 139   Potassium 3.4 - 5.3 mmol/L 4.1   Chloride 98 - 107 mmol/L 108 (H)   Carbon Dioxide (CO2) 22 - 29 mmol/L 23   Urea Nitrogen 8.0 - 23.0 mg/dL 12.4   Creatinine 0.67 - 1.17 mg/dL 0.82   GFR Estimate >60 mL/min/1.73m2 >90   Calcium 8.6 - 10.0 mg/dL 9.1   Anion Gap 7 - 15 mmol/L 8   Albumin 3.5 - 5.2 g/dL 4.0   Protein Total 6.4 - 8.3 g/dL 6.8   Alkaline Phosphatase 40 - 129 U/L 138 (H)   ALT 10 - 50 U/L 27   AST 10 - 50 U/L 37   Bilirubin Total <=1.2 mg/dL 0.9   Glucose 70 - 99 mg/dL 99   WBC 4.0 - 11.0 10e3/uL 8.4   Hemoglobin 13.3 - 17.7 g/dL 14.6   Hematocrit 40.0 - 53.0 % 44.3   Platelet Count 150 - 450 10e3/uL 244   RBC Count 4.40 - 5.90  10e6/uL 5.03   MCV 78 - 100 fL 88   MCH 26.5 - 33.0 pg 29.0   MCHC 31.5 - 36.5 g/dL 33.0   RDW 10.0 - 15.0 % 14.6   % Neutrophils % 53   % Lymphocytes % 28   % Monocytes % 10   % Eosinophils % 8   % Basophils % 1   Absolute Basophils 0.0 - 0.2 10e3/uL 0.1   Absolute Eosinophils 0.0 - 0.7 10e3/uL 0.7   Absolute Immature Granulocytes <=0.4 10e3/uL 0.0   Absolute Lymphocytes 0.8 - 5.3 10e3/uL 2.4   Absolute Monocytes 0.0 - 1.3 10e3/uL 0.9   % Immature Granulocytes % 0   Absolute Neutrophils 1.6 - 8.3 10e3/uL 4.4   Absolute NRBCs 10e3/uL 0.0   NRBCs per 100 WBC <1 /100 0   (H): Data is abnormally high    Lacey Orellana PA-C

## 2022-10-18 PROCEDURE — 300N000003 HC RESEARCH SPECIMEN PROCESSING, SIMPLE

## 2022-10-18 PROCEDURE — 510N000029 HC RESEARCH B&I MEALS, PER MEAL

## 2022-10-18 PROCEDURE — 300N000007 HC RESEARCH B&I SPECIMEN PROCESSING, SIMPLE

## 2022-10-18 PROCEDURE — 510N000023 HC CRU B&I PATIENT CARE, PER 15 MIN

## 2022-10-18 PROCEDURE — 510N000009 HC RESEARCH FACILITY, PER 15 MIN

## 2022-10-18 ASSESSMENT — ACTIVITIES OF DAILY LIVING (ADL)
ADLS_ACUITY_SCORE: 35

## 2022-10-18 NOTE — PROGRESS NOTES
Study description: [id number] is a randomized, participant and investigator blinded, sponsor open-label, placebo-controlled, single dose study to investigate the safety and tolerability of [id number] in heart failure participants with reduced ejection fraction    Subject Domiciled? [x] Yes  [] No      Vitals:    10/18/22 0827 10/18/22 0831 10/18/22 0834 10/18/22 0846   BP: 112/76 109/75 109/76    BP Location: Left arm Left arm Left arm    Patient Position: Sitting Sitting Sitting    Cuff Size: Adult Regular Adult Regular Adult Regular    Pulse: 55 55 56    Resp: 17      Temp: 97.6  F (36.4  C)      TempSrc: Temporal      SpO2: 97%      Weight:    86.9 kg (191 lb 9.3 oz)       (pre breakfast x3)    ECG done and evaluated at 0816   See separate document for Pam's formula calculation    IP administered? Yes  Time 0911      PK collected? Yes  0hr pre dose Time 0839    Banked Biomarker serum and plasma pre dose Collected? YesTime 0839    Immunogenicity pre dose collected? Yes Time 0839      Solicit for adverse events/ serious adverse events- no new adverse events @ this time.  Noted weight change (5 pound decrease since 10/16/22)-he stated weight taken on Sunday was with different footwear.       Jacy Fox RN    Participant taking home meds independently.

## 2022-10-18 NOTE — PROGRESS NOTES
Study description: [id number] is a randomized, participant and investigator blinded, sponsor open-label, placebo-controlled, single dose study to investigate the safety and tolerability of [id number] in heart failure participants with reduced ejection fraction    Study Day 1 of Injection  Provider note    For abnormal findings, please evaluate if the finding is Clinically Significant (by 'CS') or Not Clinically Significant (by 'NCS')    General Appearance  Abnormal: NCS glasses    Lungs    Normal    Cardiovascular  Normal    Abdomen   Normal    Neurological   Normal      Vitals:    10/18/22 0827 10/18/22 0831 10/18/22 0834 10/18/22 0846   BP: 112/76 109/75 109/76    BP Location: Left arm Left arm Left arm    Patient Position: Sitting Sitting Sitting    Cuff Size: Adult Regular Adult Regular Adult Regular    Pulse: 55 55 56    Resp: 17      Temp: 97.6  F (36.4  C)      TempSrc: Temporal      SpO2: 97%      Weight:    86.9 kg (191 lb 9.3 oz)         Gauri Jones NP

## 2022-10-18 NOTE — PROGRESS NOTES
Study description: [id number] is a randomized, participant and investigator blinded, sponsor open-label, placebo-controlled, single dose study to investigate the safety and tolerability of XCY279 in heart failure participants with reduced ejection fraction    Day 1/ 6 hours post dose    Subject Domiciled? Yes    See VS flowsheet for VS collected from 1514 to 1520.    ECG done and evaluated at 1509  See separate page for Pam's formula calculations    PK collected? Yes  6hr post dose Time 1523    Injection Site Reaction? Yes, 2-3 mm erythyema around injection site.    No other AEs or SAEs    Digna Galvez, RN    No current facility-administered medications for this encounter.

## 2022-10-18 NOTE — PROGRESS NOTES
Study description: [id number] is a randomized, participant and investigator blinded, sponsor open-label, placebo-controlled, single dose study to investigate the safety and tolerability of LBZ282 in heart failure participants with reduced ejection fraction    Subject attended today for randomization   Participant Domiciled? [x] Yes  [] No  Inclusion & Exclusion criteria reviewed again with subject and they remain eligible to participate.     He has no questions at this time regarding the study.    Current Facility-Administered Medications:      [START ON 10/18/2022] study - [id number] vs. placebo (IDS# [id number]) subcutaneous injection 120 mg, 120 mg, Subcutaneous, Once, Hernan Israel MD     Serum pregnancy test done  Results Or [x] N/A     Subject seen by provider CRESENCIO Umanzor  for study related physical exam.      Clinical congestion score & NYHA Classification Completed? [x] Yes  [] No      Patient Vitals for the past 24 hrs:   BP Temp Temp src Pulse Resp SpO2   10/17/22 1724 (!) 142/84 -- -- 50 -- --   10/17/22 1722 139/85 -- -- 50 -- --   10/17/22 1720 (!) 130/90 98.1  F (36.7  C) Temporal 51 16 98 %   10/17/22 1224 137/86 -- -- 53 -- --   10/17/22 1222 139/88 -- -- (!) 49 -- --   10/17/22 1220 (!) 133/91 98.4  F (36.9  C) Temporal 54 14 --   10/17/22 0824 (!) 133/90 -- -- 59 -- --   10/17/22 0822 130/85 -- -- 54 -- --   10/17/22 0820 130/84 98.1  F (36.7  C) Temporal 56 14 98 %     ECG done per protocol   See separate document for Pam's Qtc calculation    24hr Urine collected? [x] Yes  [] No  Started 10/16/2022 @ ~ 1800    Central  and Local Labs done at 0923  Urinalysis obtained?  [x] Yes  [] No [id number]    Blood cGMP-PD  collected?  [x] Yes  [] NoTime 0923    Spot Urine cGMP obtained?  [x] Yes  [] No Time 0923    Blood Smart Sampling obtained?  [x] Yes  [] No Time  0923    Exploratory Biomarkers Plasma (ANP and BNP) obtained? Yes/No Time 0923    Any AE/JOSEE? No   Noted new finding on PE of  wolfgang on back.  He states has been present for some time.    Plan: IDS to randomize participant, study dosing tomorrow @~9328.  Jacy Fox RN

## 2022-10-18 NOTE — PROGRESS NOTES
Study description: [id number] is a randomized, participant and investigator blinded, sponsor open-label, placebo-controlled, single dose study to investigate the safety and tolerability of [id number] in heart failure participants with reduced ejection fraction        Diagnosis/event description (diagnosis preferred):  2-3 mm erythema around injection site  Start date: 10-  Time: 1715  Date resolved: NA Time: NA  Date study team became aware of event: 10-18-22    Dr. Israel: Reasonable possibility that AE is related to investigational product    [x] Yes   [] No    Severity: ([x] mild /[]  moderate / [] severe)     Serious adverse event?   [] Yes   [x] No         Action taken with the investigational product:   [] Dose not changed   [] Drug interrupted   [] Drug permanently discontinued   [x] Not applicable    Did AE lead to study discontinuation: No  Outcome: [] Recovered/Resolved [] Recovering / Resolving   [] Recovered/Resolved with sequelae [x]  Not Recovered/Not Resolved [] Fatal      Is this a potential Heart Failure Event? [] Yes   [x] No        Digna Galvez RN  Clinical Trials Nurse

## 2022-10-19 ENCOUNTER — TRANSFERRED RECORDS (OUTPATIENT)
Dept: CARDIOLOGY | Facility: CLINIC | Age: 57
End: 2022-10-19

## 2022-10-19 PROCEDURE — 510N000023 HC CRU B&I PATIENT CARE, PER 15 MIN

## 2022-10-19 PROCEDURE — 510N000029 HC RESEARCH B&I MEALS, PER MEAL

## 2022-10-19 PROCEDURE — 300N000007 HC RESEARCH B&I SPECIMEN PROCESSING, SIMPLE

## 2022-10-19 PROCEDURE — 510N000009 HC RESEARCH FACILITY, PER 15 MIN

## 2022-10-19 ASSESSMENT — ACTIVITIES OF DAILY LIVING (ADL)
ADLS_ACUITY_SCORE: 35

## 2022-10-19 NOTE — PROGRESS NOTES
Study description: [id number] is a randomized, participant and investigator blinded, sponsor open-label, placebo-controlled, single dose study to investigate the safety and tolerability of KUP517 in heart failure participants with reduced ejection fraction        Diagnosis/event description (diagnosis preferred):  Hypotension  Start date: 19 OCT 2022   Time: 0901  ongoing  Date study team became aware of event: 19 OCT 2022    Patient Vitals for the past 24 hrs:   BP Temp Temp src Pulse Resp SpO2 Weight   10/19/22 1208 93/66 -- -- 63 -- -- --   10/19/22 1206 (!) 89/61 -- -- 66 -- -- --   10/19/22 1204 (!) 87/58 97.8  F (36.6  C) Temporal 63 14 -- --   10/19/22 0908 -- -- -- -- -- -- 86 kg (189 lb 9.5 oz)   10/19/22 0905 99/72 -- -- 59 -- -- --   10/19/22 0903 98/71 -- -- 59 -- -- --   10/19/22 0901 97/71 97.4  F (36.3  C) Temporal 57 14 100 % --   10/18/22 2205 106/69 -- -- 59 -- -- --   10/18/22 2203 108/72 -- -- 61 -- -- --   10/18/22 2201 101/69 -- -- -- -- -- --   10/18/22 1733 125/84 -- -- 54 -- -- --   10/18/22 1731 121/77 -- -- 55 -- -- --   10/18/22 1728 116/76 98  F (36.7  C) Temporal 57 16 98 % --   10/18/22 1520 116/82 -- -- 61 -- -- --   10/18/22 1517 110/83 -- -- 70 -- -- --   10/18/22 1514 112/72 97.5  F (36.4  C) Temporal 60 -- 96 % --   Participant took regular doses of Carvedilol (25 mg orally, twice a day) and Lisinopril (7.5 mg orally daily) this AM.  He denies symptoms with the change in BP.  CRESENCIO Umanzor, had encouraged him to ensure he was maintaining an adequate oral intake.  Per Dr. Israel: Carvedilol dose decreased to 12.5 mg orally twice a day and Lisinopril decreased to 2.5 mg orally daily for the duration of the domicling period    Orders entered    Dr. Israel: Reasonable possibility that AE is related to investigational product    [x] Yes   [] No    Severity: ([x] mild /[]  moderate / [] severe)     Serious adverse event?   [] Yes   [x] No      Action taken with the  investigational product:   [] Dose not changed   [] Drug interrupted   [] Drug permanently discontinued   [x] Not applicable    Did AE lead to study discontinuation: No  Outcome: [] Recovered/Resolved [] Recovering / Resolving   [] Recovered/Resolved with sequelae [x]  Not Recovered/Not Resolved [] Fatal      Is this a potential Heart Failure Event? [] Yes   [x] No        Jacy Fox RN  Clinical Trials Nurse

## 2022-10-19 NOTE — PROGRESS NOTES
Study description: [id number] is a randomized, participant and investigator blinded, sponsor open-label, placebo-controlled, single dose study to investigate the safety and tolerability of TUV301 in heart failure participants with reduced ejection fraction      Physical Examination-Abbreviated  For abnormal findings, please evaluate if the finding is Clinically Significant (by 'CS') or Not Clinically Significant (by 'NCS')    General Appearance  Abnormal: wearing glasses; NCS    Lungs    Normal    Cardiovascular  Abnormal: bradycardic; NCS    Abdomen   Normal    Neurological   Normal    Injection site: Left lower abdomen; 5 mm erythema surrounding site; no reports of pain, tenderness; negative for signs of infection.    Vitals:    10/19/22 0901 10/19/22 0903 10/19/22 0905 10/19/22 0908   BP: 97/71 98/71 99/72    BP Location: Left arm Left arm Left arm    Patient Position: Sitting Sitting Sitting    Cuff Size: Adult Regular Adult Regular Adult Regular    Pulse: 57 59 59    Resp: 14      Temp: 97.4  F (36.3  C)      TempSrc: Temporal      SpO2: 100%      Weight:    86 kg (189 lb 9.5 oz)         Lacey Orellana PA-C

## 2022-10-19 NOTE — PROCEDURES
+ 12 hour ECG done at 2155. ECG late due to technical difficulties in acquiring.    See VS flow sheet for vital signs collected for +12 hour post dose on 10/18/2022. VS collected late because of above ECG.    PK collected after ECG and VS. Late due to the above noted technical difficulties with acquiring ECG.    Injection site has 2-3 mm redness around injection site.  No other adverse events to note.

## 2022-10-19 NOTE — PROGRESS NOTES
Study description: [id number] is a randomized, participant and investigator blinded, sponsor open-label, placebo-controlled, single dose study to investigate the safety and tolerability of LTI466 in heart failure participants with reduced ejection fraction    Day 2    Subject  Domiciled? Yes    Subject is still eligible and happy to continue in trial.    Vitals:    10/19/22 0908 10/19/22 1204 10/19/22 1206 10/19/22 1208   BP:  (!) 87/58 (!) 89/61 93/66   BP Location:  Left arm Left arm Left arm   Patient Position:  Sitting Sitting Sitting   Cuff Size:  Adult Regular Adult Regular Adult Regular   Pulse:  63 66 63   Resp:  14     Temp:  97.8  F (36.6  C)     TempSrc:  Temporal     SpO2:       Weight: 86 kg (189 lb 9.5 oz)        (x3)    ECG done and evaluated at 0850  See separate page for Pam's formula calculations    24 Hrs Urinestarted? Yes    PK collected? Yes   Time 0911    Injection site Reaction? Yes   2-3 mm area of errythema noted @ injection site. Unchanged since yesterday (10/18/2022).  Hypotension (assymptomiatic) noted. Dr. Israel has asked participant to decrease his dose of carvedilol (from 25 mg PO BID to 12.5 mg PO BID) and lisinopril (from 7.5 mg PO daily to 2.5 mg PO daily) for the duration of his domiciling period.   Will continue to observe    Also noted protocol deviation with 12-hour post dose assessments done out of window on 10/18/2022.  Will discuss full details with Dr. Israel when more information available    Jacy Fox RN      No current facility-administered medications for this encounter.

## 2022-10-19 NOTE — PROGRESS NOTES
Study description: [id number] is a randomized, participant and investigator blinded, sponsor open-label, placebo-controlled, single dose study to investigate the safety and tolerability of IWF539 in heart failure participants with reduced ejection fraction    Day 2    Subject  Domiciled? Yes    Subject is still eligible and happy to continue in trial.    Patient Vitals   BP Temp Temp src Pulse   10/19/22 1726 113/74 98.2  F (36.8  C) Temporal 61   10/19/22 1724 112/72 -- -- 60   10/19/22 1722 119/76 -- -- 58     Injection site Reaction? Yes, improving, less erythematous. Size unchanged (2-3 mm).    Digna Galvez, RN  Clinical Trials Nurse      No current facility-administered medications for this encounter.

## 2022-10-19 NOTE — PROGRESS NOTES
October 17 research labs reviewed, mildly high chloride, CK, and GGT all noted and these are not clinically significant.  LF

## 2022-10-20 PROCEDURE — 510N000009 HC RESEARCH FACILITY, PER 15 MIN

## 2022-10-20 PROCEDURE — 510N000023 HC CRU B&I PATIENT CARE, PER 15 MIN

## 2022-10-20 PROCEDURE — 510N000029 HC RESEARCH B&I MEALS, PER MEAL

## 2022-10-20 PROCEDURE — 300N000007 HC RESEARCH B&I SPECIMEN PROCESSING, SIMPLE

## 2022-10-20 PROCEDURE — 300N000010 HC RESEARCH B&I SPECIMEN PACKAGING, COMPLEX

## 2022-10-20 ASSESSMENT — ACTIVITIES OF DAILY LIVING (ADL)
ADLS_ACUITY_SCORE: 35

## 2022-10-20 NOTE — PROGRESS NOTES
Study description: [id number] is a randomized, participant and investigator blinded, sponsor open-label, placebo-controlled, single dose study to investigate the safety and tolerability of ZBI100 in heart failure participants with reduced ejection fraction        Diagnosis/event description (diagnosis preferred):  errythema @ injection site (site reaction-errythema)  Start date: 18 OCT 2022   Time: 1858    Date study team became aware of event: 18 OCT 2022    Dr. Israel: Reasonable possibility that AE is related to investigational product    [x] Yes   [] No    Severity: ([x] mild /[]  moderate / [] severe)     Serious adverse event?   [] Yes   [x] No      Action taken with the investigational product:   [] Dose not changed   [] Drug interrupted   [] Drug permanently discontinued   [x] Not applicable -single dose already administered    Did AE lead to study discontinuation: No  Outcome: [] Recovered/Resolved [] Recovering / Resolving   [] Recovered/Resolved with sequelae [x]  Not Recovered/Not Resolved [] Fatal      Is this a potential Heart Failure Event? [] Yes   [x] No    Jacy Fox RN

## 2022-10-20 NOTE — PROGRESS NOTES
Study description: [id number] is a randomized, participant and investigator blinded, sponsor open-label, placebo-controlled, single dose study to investigate the safety and tolerability of QXR943 in heart failure participants with reduced ejection fraction    Day 3    Subject Domiciled? Yes    Subject is still eligible and happy to continue in trial.    Patient Vitals for pre-lunch and pre-dinner   BP Temp Temp src Pulse Weight   10/20/22 1736 121/75 -- -- 70 --   10/20/22 1733 94/77 -- -- 77 --   10/20/22 1732 109/73 -- -- 68 --   10/20/22 1240 107/75 -- -- 61 --   10/20/22 1238 111/73 -- -- 60 --   10/20/22 1235 109/73 -- -- 62 --     Any Injection Site reaction? Injection site reaction continues and is a light pink in color. No change in size.    No AEs reported    Digna Galvez RN  Clinical Trials Nurse      No current facility-administered medications for this encounter.

## 2022-10-20 NOTE — PROGRESS NOTES
Study description: [id number] is a randomized, participant and investigator blinded, sponsor open-label, placebo-controlled, single dose study to investigate the safety and tolerability of EGO140 in heart failure participants with reduced ejection fraction      Physical Examination-Abbreviated  For abnormal findings, please evaluate if the finding is Clinically Significant (by 'CS') or Not Clinically Significant (by 'NCS')    General Appearance  Abnormal: NCS wears glasses    Lungs    Normal    Cardiovascular  Normal    Abdomen   Normal    Neurological   Normal    Injection site  6 mm circular light purple edge and clearing in the center at injection site of left lower abdomen    Vitals:    10/20/22 0718 10/20/22 0748 10/20/22 0750 10/20/22 0752   BP:  (P) 113/78 (P) 112/79 (P) 114/77   BP Location:  (P) Left arm (P) Left arm (P) Left arm   Patient Position:  (P) Sitting (P) Sitting (P) Sitting   Cuff Size:  (P) Adult Regular (P) Adult Regular (P) Adult Regular   Pulse:  (P) 61 (P) 61 (P) 61   Resp:       Temp:    (P) 97.5  F (36.4  C)   TempSrc:    (P) Temporal   SpO2:       Weight: (P) 86.5 kg (190 lb 11.2 oz)            Gauri Jones NP, NP

## 2022-10-20 NOTE — PROGRESS NOTES
Left LLQ injection site without erythema, swelling or discomfort. Slight amt. of bruising around injection site of 0.6 cm.

## 2022-10-21 PROCEDURE — 300N000007 HC RESEARCH B&I SPECIMEN PROCESSING, SIMPLE

## 2022-10-21 PROCEDURE — 510N000029 HC RESEARCH B&I MEALS, PER MEAL

## 2022-10-21 PROCEDURE — 510N000009 HC RESEARCH FACILITY, PER 15 MIN

## 2022-10-21 PROCEDURE — 510N000023 HC CRU B&I PATIENT CARE, PER 15 MIN

## 2022-10-21 ASSESSMENT — ACTIVITIES OF DAILY LIVING (ADL)
ADLS_ACUITY_SCORE: 35

## 2022-10-21 NOTE — PROGRESS NOTES
Study description: [id number] is a randomized, participant and investigator blinded, sponsor open-label, placebo-controlled, single dose study to investigate the safety and tolerability of BSU039 in heart failure participants with reduced ejection fraction    Day 4    Subject  Domiciled? yes    Subject is still eligible and happy to continue in trial.    Patient Vitals for the past 24 hrs:   BP Temp Pulse Weight   10/21/22 0844 129/77 -- 65 --   10/21/22 0842 124/82 -- 63 --   10/21/22 0841 132/88 97.9  F (36.6  C) 62 --   10/21/22 0738 -- -- -- 86.8 kg (191 lb 5.8 oz)   10/20/22 1736 121/75 -- 70 --   10/20/22 1733 94/77 -- 77 --   10/20/22 1732 109/73 -- 68 --   10/20/22 1240 107/75 -- 61 --   10/20/22 1238 111/73 -- 60 --   10/20/22 1235 109/73 -- 62 --         ECG done and evaluated at 08.33am  See separate page for Pam's formula calculations    Labs    24 Hrs Urine collected? yes  o Time 09.00am      PK collected? Yes  o Time  09.24am    Injection Site Reaction? No change in size      Any AE/JOSEE  No other AE reported    Gini Mccann RN    No current facility-administered medications for this encounter.

## 2022-10-21 NOTE — PROGRESS NOTES
Study description: [id number] is a randomized, participant and investigator blinded, sponsor open-label, placebo-controlled, single dose study to investigate the safety and tolerability of RHT855 in heart failure participants with reduced ejection fraction    Day 4    Subject  Domiciled? Yes    Subject is still eligible and happy to continue in trial.    Patient Vitals for the past 24 hrs:   BP Temp Temp src Pulse Resp SpO2 Weight   10/21/22 1743 118/82 -- -- 69 -- -- --   10/21/22 1741 118/77 -- -- 73 -- -- --   10/21/22 1738 114/79 97.5  F (36.4  C) Temporal 72 19 97 % --   10/21/22 1349 115/80 -- -- 63 -- -- --   10/21/22 1348 111/75 -- -- 65 -- -- --   10/21/22 1346 116/75 97.7  F (36.5  C) -- 66 15 -- --     Injection Site Reaction? Yes, color is pale pink and and hardly visible. No change is size noted.    No other adverse events reports.    Digna Galvez, RN    No current facility-administered medications for this encounter.

## 2022-10-21 NOTE — PROGRESS NOTES
Study description: [id number] is a randomized, participant and investigator blinded, sponsor open-label, placebo-controlled, single dose study to investigate the safety and tolerability of AMK496 in heart failure participants with reduced ejection fraction      Physical Examination-Abbreviated  For abnormal findings, please evaluate if the finding is Clinically Significant (by 'CS') or Not Clinically Significant (by 'NCS')    General Appearance  Abnormal: NCS; wears glasses    Lungs    Normal    Cardiovascular  Normal    Abdomen   Normal    Neurological   Normal    Injection site: abdomen LLQ, unchanged in size, continues to clear; patient reports no issues/concerns    Vitals:    10/21/22 0738 10/21/22 0841 10/21/22 0842 10/21/22 0844   BP:  132/88 124/82 129/77   BP Location:  Left arm     Patient Position:  Sitting     Cuff Size:  Adult Large     Pulse:  62 63 65   Resp:       Temp:  97.9  F (36.6  C)     TempSrc:       SpO2:       Weight: 86.8 kg (191 lb 5.8 oz)            Lacey Orellana PA-C

## 2022-10-22 VITALS
TEMPERATURE: 97.5 F | RESPIRATION RATE: 19 BRPM | WEIGHT: 189.6 LBS | SYSTOLIC BLOOD PRESSURE: 121 MMHG | OXYGEN SATURATION: 98 % | BODY MASS INDEX: 29.26 KG/M2 | HEART RATE: 70 BPM | DIASTOLIC BLOOD PRESSURE: 84 MMHG

## 2022-10-22 LAB
ALBUMIN SERPL BCG-MCNC: 4.6 G/DL (ref 3.5–5.2)
ALBUMIN UR-MCNC: NEGATIVE MG/DL
ALP SERPL-CCNC: 157 U/L (ref 40–129)
ALT SERPL W P-5'-P-CCNC: 35 U/L (ref 10–50)
ANION GAP SERPL CALCULATED.3IONS-SCNC: 10 MMOL/L (ref 7–15)
ANION GAP SERPL CALCULATED.3IONS-SCNC: 15 MMOL/L (ref 7–15)
APPEARANCE UR: CLEAR
AST SERPL W P-5'-P-CCNC: 42 U/L (ref 10–50)
BILIRUB SERPL-MCNC: 1.2 MG/DL
BILIRUB UR QL STRIP: NEGATIVE
BUN SERPL-MCNC: 19 MG/DL (ref 8–23)
BUN SERPL-MCNC: 19 MG/DL (ref 8–23)
CALCIUM SERPL-MCNC: 10 MG/DL (ref 8.6–10)
CALCIUM SERPL-MCNC: 10.2 MG/DL (ref 8.6–10)
CHLORIDE SERPL-SCNC: 100 MMOL/L (ref 98–107)
CHLORIDE SERPL-SCNC: 100 MMOL/L (ref 98–107)
COLOR UR AUTO: NORMAL
CREAT SERPL-MCNC: 1.03 MG/DL (ref 0.67–1.17)
CREAT SERPL-MCNC: 1.04 MG/DL (ref 0.67–1.17)
DEPRECATED HCO3 PLAS-SCNC: 22 MMOL/L (ref 22–29)
DEPRECATED HCO3 PLAS-SCNC: 25 MMOL/L (ref 22–29)
ERYTHROCYTE [DISTWIDTH] IN BLOOD BY AUTOMATED COUNT: 14.1 % (ref 10–15)
GFR SERPL CREATININE-BSD FRML MDRD: 83 ML/MIN/1.73M2
GFR SERPL CREATININE-BSD FRML MDRD: 84 ML/MIN/1.73M2
GLUCOSE SERPL-MCNC: 90 MG/DL (ref 70–99)
GLUCOSE SERPL-MCNC: 90 MG/DL (ref 70–99)
GLUCOSE UR STRIP-MCNC: NEGATIVE MG/DL
HCT VFR BLD AUTO: 49.7 % (ref 40–53)
HGB BLD-MCNC: 16.8 G/DL (ref 13.3–17.7)
HGB UR QL STRIP: NEGATIVE
KETONES UR STRIP-MCNC: NEGATIVE MG/DL
LEUKOCYTE ESTERASE UR QL STRIP: NEGATIVE
MCH RBC QN AUTO: 29.5 PG (ref 26.5–33)
MCHC RBC AUTO-ENTMCNC: 33.8 G/DL (ref 31.5–36.5)
MCV RBC AUTO: 87 FL (ref 78–100)
NITRATE UR QL: NEGATIVE
PH UR STRIP: 5.5 [PH] (ref 5–7)
PLATELET # BLD AUTO: 232 10E3/UL (ref 150–450)
POTASSIUM SERPL-SCNC: 5 MMOL/L (ref 3.4–5.3)
POTASSIUM SERPL-SCNC: 5 MMOL/L (ref 3.4–5.3)
PROT SERPL-MCNC: 8.3 G/DL (ref 6.4–8.3)
RBC # BLD AUTO: 5.69 10E6/UL (ref 4.4–5.9)
RBC URINE: 2 /HPF
SODIUM SERPL-SCNC: 135 MMOL/L (ref 136–145)
SODIUM SERPL-SCNC: 137 MMOL/L (ref 136–145)
SP GR UR STRIP: 1.01 (ref 1–1.03)
UROBILINOGEN UR STRIP-MCNC: NORMAL MG/DL
WBC # BLD AUTO: 9.8 10E3/UL (ref 4–11)
WBC URINE: <1 /HPF

## 2022-10-22 PROCEDURE — 85027 COMPLETE CBC AUTOMATED: CPT | Performed by: INTERNAL MEDICINE

## 2022-10-22 PROCEDURE — 36415 COLL VENOUS BLD VENIPUNCTURE: CPT | Performed by: INTERNAL MEDICINE

## 2022-10-22 PROCEDURE — 300N000010 HC RESEARCH B&I SPECIMEN PACKAGING, COMPLEX

## 2022-10-22 PROCEDURE — 510N000029 HC RESEARCH B&I MEALS, PER MEAL

## 2022-10-22 PROCEDURE — 510N000009 HC RESEARCH FACILITY, PER 15 MIN

## 2022-10-22 PROCEDURE — 510N000023 HC CRU B&I PATIENT CARE, PER 15 MIN

## 2022-10-22 PROCEDURE — 82310 ASSAY OF CALCIUM: CPT | Performed by: INTERNAL MEDICINE

## 2022-10-22 PROCEDURE — 300N000009 HC RESEARCH B&I SPECIMEN PROCESSING, COMPLEX

## 2022-10-22 PROCEDURE — 81001 URINALYSIS AUTO W/SCOPE: CPT | Performed by: INTERNAL MEDICINE

## 2022-10-22 ASSESSMENT — ACTIVITIES OF DAILY LIVING (ADL)
ADLS_ACUITY_SCORE: 35

## 2022-10-22 NOTE — PROGRESS NOTES
Study description: [id number] is a randomized, participant and investigator blinded, sponsor open-label, placebo-controlled, single dose study to investigate the safety and tolerability of RNN415 in heart failure participants with reduced ejection fraction    Day 5    Subject is Domiciled? Yes    Subject is still eligible and happy to continue in trial.    Subject seen by provider Rula Jones NP and CRESENCIO Umanzor-Cfor study related physical exam.    NYHA Classification & Clinical congestion score completed by provider Yes   Please refer to their note for details      Patient Vitals for the past 24 hrs:   BP Temp Temp src Pulse Resp SpO2 Weight   10/22/22 0817 121/84 -- -- 70 -- -- --   10/22/22 0815 122/80 -- -- 68 -- 98 % --   10/22/22 0813 122/84 97.5  F (36.4  C) Temporal 67 -- -- --   10/22/22 0754 -- -- -- -- -- -- 86 kg (189 lb 9.5 oz)       ECG done and evaluated at 0755  See separate page for Pam's formula calculations    Labs    [facility] done at 0901    Urinalysis collected? Yes  o Time 0854      PK collected? Yes  o Time 0901      Blood cGMP-PD  collected? Yes   o Time 0901      Spot Urine cGMP  obtained? Yes  o Time 0854      Blood Smart Sampling obtained? Yes  o Time 0821, 0822, 0831, 0857      Exploratory Biomarkers Plasma (ANP and BNP) obtained? Yes  o Time 0901    Injection Site Reaction? Yes, and is barely visible and considered resolved.    Denied any adverse events.    Plan: discharge to home today at 11:20. See provider's discharge notes for further details. Instructed to continue with Carvedilol 12.5 mg.     Will be seen in Clinic for Day 7. Follow-up with Kyree Mccann RN on 10-.    Digna Galvez RN  Clinical Trials Nurse      No current facility-administered medications for this encounter.

## 2022-10-22 NOTE — PROGRESS NOTES
Study description: [id number] is a randomized, participant and investigator blinded, sponsor open-label, placebo-controlled, single dose study to investigate the safety and tolerability of VWA323 in heart failure participants with reduced ejection fraction      Physical Examination  For abnormal findings, please evaluate if the finding is Clinically Significant (by 'CS') or Not Clinically Significant (by 'NCS')    General Appearance  Abnormal: NCS wears glasses  Head and Neck  Normal  Lungs    Normal  Cardiovascular  Normal  Abdomen   Normal  Musculoskeletal/Extremities Normal   Lymph Nodes   Normal  Skin    Abnormal: 3 cm comedone on upper mid thoracic area unchanged    Neurological   Normal    NYHA [] I [x] II [] III [] IV    Clinical congestion: [x] Absent [] Present   If present, please provide further details    Vitals:    10/22/22 0754 10/22/22 0813 10/22/22 0815 10/22/22 0817   BP:  122/84 122/80 121/84   BP Location:  Left arm Left arm Left arm   Patient Position:  Sitting Sitting Sitting   Cuff Size:  Adult Regular Adult Regular Adult Regular   Pulse:  67 68 70   Resp:       Temp:  97.5  F (36.4  C)     TempSrc:  Temporal     SpO2:   98%    Weight: 86 kg (189 lb 9.5 oz)         Latest Reference Range & Units 10/22/22 09:01   Sodium 136 - 145 mmol/L 135 (L)   Potassium 3.4 - 5.3 mmol/L 5.0   Chloride 98 - 107 mmol/L 100   Carbon Dioxide (CO2) 22 - 29 mmol/L 25   Urea Nitrogen 8.0 - 23.0 mg/dL 19.0   Creatinine 0.67 - 1.17 mg/dL 1.03   GFR Estimate >60 mL/min/1.73m2 84   Calcium 8.6 - 10.0 mg/dL 10.0   Anion Gap 7 - 15 mmol/L 10   Glucose 70 - 99 mg/dL 90   WBC 4.0 - 11.0 10e3/uL 9.8   Hemoglobin 13.3 - 17.7 g/dL 16.8   Hematocrit 40.0 - 53.0 % 49.7   Platelet Count 150 - 450 10e3/uL 232   RBC Count 4.40 - 5.90 10e6/uL 5.69   MCV 78 - 100 fL 87   MCH 26.5 - 33.0 pg 29.5   MCHC 31.5 - 36.5 g/dL 33.8   RDW 10.0 - 15.0 % 14.1   Color Urine Colorless, Straw, Light Yellow, Yellow  Straw   Appearance Urine  Clear  Clear   Glucose Urine Negative mg/dL Negative   Bilirubin Urine Negative  Negative   Ketones Urine Negative mg/dL Negative   Specific Gravity Urine 1.003 - 1.035  1.010   pH Urine 5.0 - 7.0  5.5   Protein Albumin Urine Negative mg/dL Negative   Urobilinogen mg/dL Normal, 2.0 mg/dL Normal   Nitrite Urine Negative  Negative   Blood Urine Negative  Negative   Leukocyte Esterase Urine Negative  Negative   WBC Urine <=5 /HPF <1   RBC Urine <=2 /HPF 2   (L): Data is abnormally low   Latest Reference Range & Units 10/22/22 09:01   Sodium 136 - 145 mmol/L  136 - 145 mmol/L 137  135 (L)   Potassium 3.4 - 5.3 mmol/L  3.4 - 5.3 mmol/L 5.0  5.0   Chloride 98 - 107 mmol/L  98 - 107 mmol/L 100  100   Carbon Dioxide (CO2) 22 - 29 mmol/L  22 - 29 mmol/L 22  25   Urea Nitrogen 8.0 - 23.0 mg/dL  8.0 - 23.0 mg/dL 19.0  19.0   Creatinine 0.67 - 1.17 mg/dL  0.67 - 1.17 mg/dL 1.04  1.03   GFR Estimate >60 mL/min/1.73m2  >60 mL/min/1.73m2 83  84   Calcium 8.6 - 10.0 mg/dL  8.6 - 10.0 mg/dL 10.2 (H)  10.0   Anion Gap 7 - 15 mmol/L  7 - 15 mmol/L 15  10   Albumin 3.5 - 5.2 g/dL 4.6   Protein Total 6.4 - 8.3 g/dL 8.3   Alkaline Phosphatase 40 - 129 U/L 157 (H)   ALT 10 - 50 U/L 35   AST 10 - 50 U/L 42   Bilirubin Total <=1.2 mg/dL 1.2   Glucose 70 - 99 mg/dL  70 - 99 mg/dL 90  90   WBC 4.0 - 11.0 10e3/uL 9.8   Hemoglobin 13.3 - 17.7 g/dL 16.8   Hematocrit 40.0 - 53.0 % 49.7   Platelet Count 150 - 450 10e3/uL 232   RBC Count 4.40 - 5.90 10e6/uL 5.69   MCV 78 - 100 fL 87   MCH 26.5 - 33.0 pg 29.5   MCHC 31.5 - 36.5 g/dL 33.8   RDW 10.0 - 15.0 % 14.1   Color Urine Colorless, Straw, Light Yellow, Yellow  Straw   Appearance Urine Clear  Clear   Glucose Urine Negative mg/dL Negative   Bilirubin Urine Negative  Negative   Ketones Urine Negative mg/dL Negative   Specific Gravity Urine 1.003 - 1.035  1.010   pH Urine 5.0 - 7.0  5.5   Protein Albumin Urine Negative mg/dL Negative   Urobilinogen mg/dL Normal, 2.0 mg/dL Normal   Nitrite Urine  Negative  Negative   Blood Urine Negative  Negative   Leukocyte Esterase Urine Negative  Negative   WBC Urine <=5 /HPF <1   RBC Urine <=2 /HPF 2   (L): Data is abnormally low  (H): Data is abnormally high      Patient results discussed Dr. Israel and pending liver enzymes.  Patient asymptomatic. Dr. Israel agreed with discharge.  Discussed with patient to record BP pre meal tid until follow up appointment Monday on 10/24/2022 4:00 pm. Patient was recommended also hydration.  If patient has concerns to call answering services at [phone] or urgent/emergency call 911.   Patient discharged at 11:20 am.    Gauri Jones NP

## 2022-10-23 ENCOUNTER — ANCILLARY PROCEDURE (OUTPATIENT)
Dept: CARDIOLOGY | Facility: CLINIC | Age: 57
End: 2022-10-23
Attending: INTERNAL MEDICINE

## 2022-10-23 DIAGNOSIS — I25.5 ISCHEMIC CARDIOMYOPATHY: ICD-10-CM

## 2022-10-23 DIAGNOSIS — Z95.810 ICD (IMPLANTABLE CARDIOVERTER-DEFIBRILLATOR) IN PLACE: ICD-10-CM

## 2022-10-23 ASSESSMENT — ACTIVITIES OF DAILY LIVING (ADL)
ADLS_ACUITY_SCORE: 35

## 2022-10-24 ENCOUNTER — OFFICE VISIT (OUTPATIENT)
Dept: CARDIOLOGY | Facility: CLINIC | Age: 57
End: 2022-10-24

## 2022-10-24 ENCOUNTER — TRANSFERRED RECORDS (OUTPATIENT)
Dept: CARDIOLOGY | Facility: CLINIC | Age: 57
End: 2022-10-24

## 2022-10-24 VITALS
SYSTOLIC BLOOD PRESSURE: 125 MMHG | DIASTOLIC BLOOD PRESSURE: 83 MMHG | BODY MASS INDEX: 29.26 KG/M2 | HEART RATE: 79 BPM | WEIGHT: 189.6 LBS

## 2022-10-24 DIAGNOSIS — Z00.6 EXAM FOR CLINICAL RESEARCH: Primary | ICD-10-CM

## 2022-10-24 DIAGNOSIS — I25.5 ISCHEMIC CARDIOMYOPATHY: ICD-10-CM

## 2022-10-24 PROCEDURE — 99207 PR NO CHARGE-RESEARCH SERVICE: CPT

## 2022-10-24 RX ORDER — CARVEDILOL 25 MG/1
25 TABLET ORAL 2 TIMES DAILY WITH MEALS
COMMUNITY
Start: 2022-10-19 | End: 2023-01-26

## 2022-10-24 ASSESSMENT — ACTIVITIES OF DAILY LIVING (ADL)
ADLS_ACUITY_SCORE: 35

## 2022-10-24 NOTE — PROGRESS NOTES
Study description: [id number] is a randomized, participant and investigator blinded, sponsor open-label, placebo-controlled, single dose study to investigate the safety and tolerability of ITP970 in heart failure participants with reduced ejection fraction    Subject is still eligible and happy to continue in trial.  Subject re consented to  Version 24 Aug 2022    Vitals:    10/24/22 1608 10/24/22 1610 10/24/22 1612   BP: 124/83 126/87 125/83   BP Location: Left arm Left arm    Patient Position: Sitting Sitting    Cuff Size: Adult Regular Adult Regular    Pulse: 75 81 79   Weight:   86 kg (189 lb 9.6 oz)     Temp 97.6  PK collected? Yes  Time PK collected 15.58      Any AE/JOSEE  None reported    Plan: next study visit Week 2   Schedule for next week    Gini Mccann RN        Current Outpatient Medications:      aspirin 81 mg chewable tablet, [ASPIRIN 81 MG CHEWABLE TABLET] Chew 1 tablet (81 mg total) daily., Disp: , Rfl: 0     atorvastatin (LIPITOR) 80 MG tablet, Take 1 tablet (80 mg) by mouth daily, Disp: 90 tablet, Rfl: 4     carvedilol (COREG) 25 MG tablet, Take 1 tablet (25 mg) by mouth 2 times daily (with meals), Disp: 180 tablet, Rfl: 4     furosemide (LASIX) 20 MG tablet, [FUROSEMIDE (LASIX) 20 MG TABLET] Take 0.5 tablets (10 mg total) by mouth as needed (For fluid retention, sob)., Disp: 30 tablet, Rfl: 1     lisinopril (ZESTRIL) 2.5 MG tablet, Take 1 tablet (2.5 mg) by mouth daily, Disp: 90 tablet, Rfl: 4     lisinopril (ZESTRIL) 5 MG tablet, Take 1 tablet (5 mg) by mouth daily, Disp: 90 tablet, Rfl: 4     naproxen sodium 220 MG capsule, Take 220 mg by mouth as needed, Disp: , Rfl:

## 2022-10-24 NOTE — PATIENT INSTRUCTIONS
Keep taking Bps @ home, and bring results with to next appointment.  We'll review with Dr. Israel.

## 2022-10-24 NOTE — PROGRESS NOTES
October 28 research labs reviewed, no data available thus this is not clinically significant.  No results were included.  LF

## 2022-10-24 NOTE — LETTER
Date:October 25, 2022      Provider requested that no letter be sent. Do not send.       [facility]

## 2022-10-24 NOTE — LETTER
10/24/2022    Physician No Ref-Primary  No address on file    RE: Mateusz Ganzer       Dear Colleague,     I had the pleasure of seeing Daniele Becker in the [facility].    Study description: [id number] is a randomized, participant and investigator blinded, sponsor open-label, placebo-controlled, single dose study to investigate the safety and tolerability of BMO270 in heart failure participants with reduced ejection fraction    Subject is still eligible and happy to continue in trial.  Subject re consented to  Version 24 Aug 2022    Vitals:    10/24/22 1608 10/24/22 1610 10/24/22 1612   BP: 124/83 126/87 125/83   BP Location: Left arm Left arm    Patient Position: Sitting Sitting    Cuff Size: Adult Regular Adult Regular    Pulse: 75 81 79   Weight:   86 kg (189 lb 9.6 oz)     Temp 97.6  PK collected? Yes  Time PK collected 15.58      Any AE/JOSEE  None reported    Plan: next study visit Week 2   Schedule for next week    Gini Mccann RN        Current Outpatient Medications:      aspirin 81 mg chewable tablet, [ASPIRIN 81 MG CHEWABLE TABLET] Chew 1 tablet (81 mg total) daily., Disp: , Rfl: 0     atorvastatin (LIPITOR) 80 MG tablet, Take 1 tablet (80 mg) by mouth daily, Disp: 90 tablet, Rfl: 4     carvedilol (COREG) 25 MG tablet, Take 1 tablet (25 mg) by mouth 2 times daily (with meals), Disp: 180 tablet, Rfl: 4     furosemide (LASIX) 20 MG tablet, [FUROSEMIDE (LASIX) 20 MG TABLET] Take 0.5 tablets (10 mg total) by mouth as needed (For fluid retention, sob)., Disp: 30 tablet, Rfl: 1     lisinopril (ZESTRIL) 2.5 MG tablet, Take 1 tablet (2.5 mg) by mouth daily, Disp: 90 tablet, Rfl: 4     lisinopril (ZESTRIL) 5 MG tablet, Take 1 tablet (5 mg) by mouth daily, Disp: 90 tablet, Rfl: 4     naproxen sodium 220 MG capsule, Take 220 mg by mouth as needed, Disp: , Rfl:       Thank you for allowing me to participate in the care of your patient.      Sincerely,     SANTOSH Goode  [facility]  cc:   No referring provider defined for this encounter.

## 2022-10-25 ENCOUNTER — TELEPHONE (OUTPATIENT)
Dept: CARDIOLOGY | Facility: CLINIC | Age: 57
End: 2022-10-25

## 2022-10-25 DIAGNOSIS — I25.5 ISCHEMIC CARDIOMYOPATHY: Primary | ICD-10-CM

## 2022-10-25 DIAGNOSIS — Z00.6 EXAM FOR CLINICAL RESEARCH: ICD-10-CM

## 2022-10-25 DIAGNOSIS — I50.22 CHRONIC SYSTOLIC HEART FAILURE (H): ICD-10-CM

## 2022-10-25 NOTE — TELEPHONE ENCOUNTER
Recent blood pressures (provided by Dejan) reviewed        10/22/2022:       1325 BP  155/10  HR  84     148/107   85     158/104   84    1939  133/98   81     136/94   77     128/87   78   10/23/2022       0815  118/82   70     129/88   68     128/82   69     128/84   66     2000  105/78   84      90/69   86     91/69   86    10/24/22       0430  126/92   82     133/95   80     113/87   82     124/89   81    1315  139/94   78     138/96   80     136/96   81     136/95   81     Dr. Israel reviewed this data.  Currently taking Carvedilol 12.5 mg by mouth, twice a day    Lisinopril 2.5 mg by mouth daily.  Per Dr. Israel, increase Lisinopril to 5 mg by mouth daily.  Continue to take BP & Pulse but change to 2 sets, twice daily.  Call in VS on Thursday PM or Friday AM.  Will review with Dr. Israel on Friday and call back with more updates to meds as needed.  Dejan agrees with this plan.    Jacy Fox, RN, BSN  Clinical Trials Nurse

## 2022-10-25 NOTE — TELEPHONE ENCOUNTER
"10/25/22: Called patient to review alert from battery on ICD.  \"Battery estimate is inaccurate due to voltage alert (code 1003).  Oct 22, 2022 16:33 - Red Alert - Voltage was too low for projected remaining capacity.\"  Osiris Christian, Device RN      Type: alert remote ICD transmission for voltage too low for projected remaining capacity. Done as a courtesy check.  Presenting rhythm: ventricular sensed, appears normal sinus rate 63 bpm.  Battery status: estimate is inaccurate due to voltage alert (code 1003).  Lead status: stable  Arrhythmias: since 9/20/22, none detected.  Comments: Routed to device RN.  BISHOP Rivera, Device Specialist  Add: called [employer] about alert: The  reviewed this device and alert and recommends re checking the device within 75-85 days to re evaluate battery, but this battery has low internal voltage and will need to be changed out soon. (the device is not on a recall). Called patient to review this information. He has no health insurance.  Will forward to Dr Vu and Dr Israel.  Osiris Christian, Device RN  "

## 2022-10-26 ENCOUNTER — TRANSFERRED RECORDS (OUTPATIENT)
Dept: CARDIOLOGY | Facility: CLINIC | Age: 57
End: 2022-10-26

## 2022-10-28 ENCOUNTER — DOCUMENTATION ONLY (OUTPATIENT)
Dept: CARDIOLOGY | Facility: CLINIC | Age: 57
End: 2022-10-28

## 2022-10-28 PROCEDURE — 99207 PR NO CHARGE-RESEARCH SERVICE: CPT

## 2022-10-28 NOTE — PROGRESS NOTES
Study description: [id number] is a randomized, participant and investigator blinded, sponsor open-label, placebo-controlled, single dose study to investigate the safety and tolerability of RND077 in heart failure participants with reduced ejection fraction    Subject contacted site with BP readings for the last 3 days.  25 Oct 2022  AM /89 Pulse 80  PM /92 Pulse 80    26 Oct 2022  AM /90 Pulse 77  PM /75 Pulse 77    27 Oct 2022  AM /91 Pulse 72  PM /83 Pulse 78    28 Oct 2022  AM /89 Pulse 83    Dr Israel advised to increase Lisinopril to 7.5 mg.  Subject informed, will take extra 2.5 mg this evening.  Will continue twice daily home BP checks and review at next visit on Tuesday.        Gini Mccann RN

## 2022-11-01 ENCOUNTER — OFFICE VISIT (OUTPATIENT)
Dept: CARDIOLOGY | Facility: CLINIC | Age: 57
End: 2022-11-01

## 2022-11-01 VITALS
TEMPERATURE: 96.2 F | BODY MASS INDEX: 29.91 KG/M2 | DIASTOLIC BLOOD PRESSURE: 87 MMHG | SYSTOLIC BLOOD PRESSURE: 138 MMHG | WEIGHT: 193.8 LBS | HEART RATE: 68 BPM

## 2022-11-01 DIAGNOSIS — Z00.6 EXAM FOR CLINICAL RESEARCH: Primary | ICD-10-CM

## 2022-11-01 PROCEDURE — 99207 PR NO CHARGE-RESEARCH SERVICE: CPT

## 2022-11-01 NOTE — LETTER
11/1/2022    Physician No Ref-Primary  No address on file    RE: Danilee KAUR Rosita       Dear Colleague,     I had the pleasure of seeing Mateusz Ganzer in the [facility].    Study description: [id number] is a randomized, participant and investigator blinded, sponsor open-label, placebo-controlled, single dose study to investigate the safety and tolerability of BRO112 in heart failure participants with reduced ejection fraction    Subject is still eligible and happy to continue in trial.    Subject seen by provider JUAN Orellana for study related physical exam.      Central Labs done at 16.02pm    Clinical congestion score  Version 1 completed, score 0       Time seated 16.08  Vitals:    11/01/22 16.22 11/01/22 1618 11/01/22 1620   BP: 135/83 (!) 143/85 138/87   BP Location: Left arm Left arm Left arm   Patient Position: Sitting Sitting Sitting   Cuff Size: Adult Regular Adult Regular Adult Regular   Pulse: 73 74 68   Temp:   (!) 96.2  F (35.7  C)   Weight:         Weight done at 16.06 87.9kg (193 lb 12.8 oz)      Urinalysis collected? Yes  Time: 16.40    PK collected?   Time 16.02    Blood cGMP-PD  collected?  Time 16.02    Spot Urine cGMP  obtained? Time 16.02    Exploratory Biomarkers Plasma (ANP and BNP) obtained? Time 16.02    Banked Biomarker plasma pre dose Collected?Time 16.02        Any AE/JOSEE.   No new AE/JOSEE reported  AE 1 resolved 24 Oct 2022      Gini Mccann, RN        Current Outpatient Medications:      aspirin 81 mg chewable tablet, [ASPIRIN 81 MG CHEWABLE TABLET] Chew 1 tablet (81 mg total) daily., Disp: , Rfl: 0     atorvastatin (LIPITOR) 80 MG tablet, Take 1 tablet (80 mg) by mouth daily, Disp: 90 tablet, Rfl: 4     carvedilol (COREG) 12.5 MG tablet, Take 12.5 mg by mouth 2 times daily (with meals), Disp: , Rfl:      furosemide (LASIX) 20 MG tablet, [FUROSEMIDE (LASIX) 20 MG TABLET] Take 0.5 tablets (10 mg total) by mouth as needed (For fluid retention, sob)., Disp: 30  tablet, Rfl: 1     lisinopril (ZESTRIL) 2.5 MG tablet, Take 1 tablet (2.5 mg) by mouth daily, Disp: 90 tablet, Rfl: 4     lisinopril (ZESTRIL) 5 MG tablet, Take 1 tablet (5 mg) by mouth daily (Patient not taking: Reported on 10/24/2022), Disp: 90 tablet, Rfl: 4     naproxen sodium 220 MG capsule, Take 220 mg by mouth as needed, Disp: , Rfl:         Study description: [id number] is a randomized, participant and investigator blinded, sponsor open-label, placebo-controlled, single dose study to investigate the safety and tolerability of ORE367 in heart failure participants with reduced ejection fraction      Physical Examination  For abnormal findings, please evaluate if the finding is Clinically Significant (by 'CS') or Not Clinically Significant (by 'NCS')    General Appearance  Normal  Head and Neck  Normal  Lungs    {Normal  Cardiovascular  Normal  Abdomen   Normal - injection site not visible; completely healed  Musculoskeletal/Extremities Abnormal: trace edema bilateral lower extremities   Lymph Nodes   Normal  Skin    Normal- comedone still present, no erythema; unchanged per patient  Neurological   Normal    NYHA [] I [x] II [] III [] IV    Clinical congestion: [x] Absent [] Present   If present, please provide further details    Vitals:    11/01/22 1622 11/01/22 1618 11/01/22 1620   BP: 135/83 (!) 143/85 138/87   BP Location: Left arm Left arm Left arm   Patient Position: Sitting Sitting Sitting   Cuff Size: Adult Regular Adult Regular Adult Regular   Pulse: 73 74 68   Temp:   (!) 96.2  F (35.7  C)   Weight:  87.9 kg (193 lb 12.8 oz)        Recent Labs   Lab Test 10/22/22  0901   WBC 9.8   RBC 5.69   HGB 16.8   HCT 49.7   MCV 87   MCH 29.5   MCHC 33.8   RDW 14.1            Lacey Orellana PA-C      Study description: [id number] is a randomized, participant and investigator blinded, sponsor open-label, placebo-controlled, single dose study to investigate the safety and tolerability of  ADQ860 in heart failure participants with reduced ejection fraction      Physical Examination  For abnormal findings, please evaluate if the finding is Clinically Significant (by 'CS') or Not Clinically Significant (by 'NCS')    General Appearance  Normal  Head and Neck  Normal  Lungs    {Normal  Cardiovascular  Normal  Abdomen   Normal - injection site not visible; completely healed  Musculoskeletal/Extremities Abnormal: trace edema bilateral lower extremities   Lymph Nodes   Normal  Skin    Normal- comedone still present, no erythema; unchanged per patient  Neurological   Normal    NYHA [] I [x] II [] III [] IV    Clinical congestion: [x] Absent [] Present   If present, please provide further details    Vitals:    11/01/22 1622 11/01/22 1618 11/01/22 1620   BP: 135/83 (!) 143/85 138/87   BP Location: Left arm Left arm Left arm   Patient Position: Sitting Sitting Sitting   Cuff Size: Adult Regular Adult Regular Adult Regular   Pulse: 73 74 68   Temp:   (!) 96.2  F (35.7  C)   Weight:  87.9 kg (193 lb 12.8 oz)        CBC RESULTS:   Recent Labs   Lab Test 10/22/22  0901   WBC 9.8   RBC 5.69   HGB 16.8   HCT 49.7   MCV 87   MCH 29.5   MCHC 33.8   RDW 14.1        No results found for: LOLA Orellana PA-C      Thank you for allowing me to participate in the care of your patient.      Sincerely,     Gini Mccann RN     [facility]  cc:   No referring provider defined for this encounter.

## 2022-11-01 NOTE — PROGRESS NOTES
Study description: [id number] is a randomized, participant and investigator blinded, sponsor open-label, placebo-controlled, single dose study to investigate the safety and tolerability of MKL546 in heart failure participants with reduced ejection fraction    Subject is still eligible and happy to continue in trial.    Subject seen by provider JUAN Orellana for study related physical exam.      Central Labs done at 16.02pm    Clinical congestion score  Version 1 completed, score 0       Time seated 16.08  Vitals:    11/01/22 16.22 11/01/22 1618 11/01/22 1620   BP: 135/83 (!) 143/85 138/87   BP Location: Left arm Left arm Left arm   Patient Position: Sitting Sitting Sitting   Cuff Size: Adult Regular Adult Regular Adult Regular   Pulse: 73 74 68   Temp:   (!) 96.2  F (35.7  C)   Weight:         Weight done at 16.06 87.9kg (193 lb 12.8 oz)      Urinalysis collected? Yes  Time: 16.40    PK collected?   Time 16.02    Blood cGMP-PD  collected?  Time 16.02    Spot Urine cGMP  obtained? Time 16.02    Exploratory Biomarkers Plasma (ANP and BNP) obtained? Time 16.02    Banked Biomarker plasma pre dose Collected?Time 16.02        Any AE/JOSEE.   No new AE/JOSEE reported  AE 1 resolved 24 Oct 2022      Gini Mccann RN        Current Outpatient Medications:      aspirin 81 mg chewable tablet, [ASPIRIN 81 MG CHEWABLE TABLET] Chew 1 tablet (81 mg total) daily., Disp: , Rfl: 0     atorvastatin (LIPITOR) 80 MG tablet, Take 1 tablet (80 mg) by mouth daily, Disp: 90 tablet, Rfl: 4     carvedilol (COREG) 12.5 MG tablet, Take 12.5 mg by mouth 2 times daily (with meals), Disp: , Rfl:      furosemide (LASIX) 20 MG tablet, [FUROSEMIDE (LASIX) 20 MG TABLET] Take 0.5 tablets (10 mg total) by mouth as needed (For fluid retention, sob)., Disp: 30 tablet, Rfl: 1     lisinopril (ZESTRIL) 2.5 MG tablet, Take 1 tablet (2.5 mg) by mouth daily, Disp: 90 tablet, Rfl: 4     lisinopril (ZESTRIL) 5 MG tablet, Take 1 tablet (5 mg) by mouth daily  (Patient not taking: Reported on 10/24/2022), Disp: 90 tablet, Rfl: 4     naproxen sodium 220 MG capsule, Take 220 mg by mouth as needed, Disp: , Rfl:

## 2022-11-01 NOTE — LETTER
Date:November 3, 2022      Provider requested that no letter be sent. Do not send.       [facility]

## 2022-11-01 NOTE — PROGRESS NOTES
Study description: [id number] is a randomized, participant and investigator blinded, sponsor open-label, placebo-controlled, single dose study to investigate the safety and tolerability of XSM482 in heart failure participants with reduced ejection fraction      Physical Examination  For abnormal findings, please evaluate if the finding is Clinically Significant (by 'CS') or Not Clinically Significant (by 'NCS')    General Appearance  Normal  Head and Neck  Normal  Lungs    {Normal  Cardiovascular  Normal  Abdomen   Normal - injection site not visible; completely healed  Musculoskeletal/Extremities Abnormal: trace edema bilateral lower extremities   Lymph Nodes   Normal  Skin    Normal- comedone still present, no erythema; unchanged per patient  Neurological   Normal    NYHA [] I [x] II [] III [] IV    Clinical congestion: [x] Absent [] Present   If present, please provide further details    Vitals:    11/01/22 1622 11/01/22 1618 11/01/22 1620   BP: 135/83 (!) 143/85 138/87   BP Location: Left arm Left arm Left arm   Patient Position: Sitting Sitting Sitting   Cuff Size: Adult Regular Adult Regular Adult Regular   Pulse: 73 74 68   Temp:   (!) 96.2  F (35.7  C)   Weight:  87.9 kg (193 lb 12.8 oz)        Recent Labs   Lab Test 10/22/22  0901   WBC 9.8   RBC 5.69   HGB 16.8   HCT 49.7   MCV 87   MCH 29.5   MCHC 33.8   RDW 14.1            Lacey Orellana PA-C

## 2022-11-02 ENCOUNTER — TRANSFERRED RECORDS (OUTPATIENT)
Dept: CARDIOLOGY | Facility: CLINIC | Age: 57
End: 2022-11-02

## 2022-11-02 LAB
MDC_IDC_EPISODE_DTM: NORMAL
MDC_IDC_EPISODE_ID: NORMAL
MDC_IDC_EPISODE_TYPE: NORMAL
MDC_IDC_LEAD_IMPLANT_DT: NORMAL
MDC_IDC_LEAD_LOCATION: NORMAL
MDC_IDC_LEAD_LOCATION_DETAIL_1: NORMAL
MDC_IDC_LEAD_MFG: NORMAL
MDC_IDC_LEAD_MODEL: NORMAL
MDC_IDC_LEAD_POLARITY_TYPE: NORMAL
MDC_IDC_LEAD_SERIAL: NORMAL
MDC_IDC_MSMT_BATTERY_DTM: NORMAL
MDC_IDC_MSMT_BATTERY_REMAINING_LONGEVITY: 144 MO
MDC_IDC_MSMT_BATTERY_REMAINING_PERCENTAGE: 100 %
MDC_IDC_MSMT_BATTERY_STATUS: NORMAL
MDC_IDC_MSMT_CAP_CHARGE_DTM: NORMAL
MDC_IDC_MSMT_CAP_CHARGE_DTM: NORMAL
MDC_IDC_MSMT_CAP_CHARGE_ENERGY: 21 J
MDC_IDC_MSMT_CAP_CHARGE_TIME: 10.7 S
MDC_IDC_MSMT_CAP_CHARGE_TIME: 3.8 S
MDC_IDC_MSMT_CAP_CHARGE_TYPE: NORMAL
MDC_IDC_MSMT_CAP_CHARGE_TYPE: NORMAL
MDC_IDC_MSMT_LEADCHNL_RV_IMPEDANCE_VALUE: 413 OHM
MDC_IDC_MSMT_LEADCHNL_RV_PACING_THRESHOLD_AMPLITUDE: 1.5 V
MDC_IDC_MSMT_LEADCHNL_RV_PACING_THRESHOLD_PULSEWIDTH: 0.4 MS
MDC_IDC_PG_IMPLANT_DTM: NORMAL
MDC_IDC_PG_MFG: NORMAL
MDC_IDC_PG_MODEL: NORMAL
MDC_IDC_PG_SERIAL: NORMAL
MDC_IDC_PG_TYPE: NORMAL
MDC_IDC_SESS_CLINIC_NAME: NORMAL
MDC_IDC_SESS_DTM: NORMAL
MDC_IDC_SESS_TYPE: NORMAL
MDC_IDC_SET_BRADY_LOWRATE: 40 {BEATS}/MIN
MDC_IDC_SET_BRADY_MODE: NORMAL
MDC_IDC_SET_LEADCHNL_RV_PACING_AMPLITUDE: 2.3 V
MDC_IDC_SET_LEADCHNL_RV_PACING_POLARITY: NORMAL
MDC_IDC_SET_LEADCHNL_RV_PACING_PULSEWIDTH: 0.4 MS
MDC_IDC_SET_LEADCHNL_RV_SENSING_ADAPTATION_MODE: NORMAL
MDC_IDC_SET_LEADCHNL_RV_SENSING_POLARITY: NORMAL
MDC_IDC_SET_LEADCHNL_RV_SENSING_SENSITIVITY: 0.6 MV
MDC_IDC_SET_ZONE_DETECTION_INTERVAL: 250 MS
MDC_IDC_SET_ZONE_DETECTION_INTERVAL: 324 MS
MDC_IDC_SET_ZONE_TYPE: NORMAL
MDC_IDC_SET_ZONE_TYPE: NORMAL
MDC_IDC_SET_ZONE_VENDOR_TYPE: NORMAL
MDC_IDC_SET_ZONE_VENDOR_TYPE: NORMAL
MDC_IDC_STAT_BRADY_DTM_END: NORMAL
MDC_IDC_STAT_BRADY_DTM_START: NORMAL
MDC_IDC_STAT_BRADY_RV_PERCENT_PACED: 0 %
MDC_IDC_STAT_EPISODE_RECENT_COUNT: 0
MDC_IDC_STAT_EPISODE_RECENT_COUNT_DTM_END: NORMAL
MDC_IDC_STAT_EPISODE_RECENT_COUNT_DTM_START: NORMAL
MDC_IDC_STAT_EPISODE_TYPE: NORMAL
MDC_IDC_STAT_EPISODE_VENDOR_TYPE: NORMAL
MDC_IDC_STAT_TACHYTHERAPY_ATP_DELIVERED_RECENT: 0
MDC_IDC_STAT_TACHYTHERAPY_ATP_DELIVERED_TOTAL: 0
MDC_IDC_STAT_TACHYTHERAPY_RECENT_DTM_END: NORMAL
MDC_IDC_STAT_TACHYTHERAPY_RECENT_DTM_START: NORMAL
MDC_IDC_STAT_TACHYTHERAPY_SHOCKS_ABORTED_RECENT: 0
MDC_IDC_STAT_TACHYTHERAPY_SHOCKS_ABORTED_TOTAL: 0
MDC_IDC_STAT_TACHYTHERAPY_SHOCKS_DELIVERED_RECENT: 0
MDC_IDC_STAT_TACHYTHERAPY_SHOCKS_DELIVERED_TOTAL: 1
MDC_IDC_STAT_TACHYTHERAPY_TOTAL_DTM_END: NORMAL
MDC_IDC_STAT_TACHYTHERAPY_TOTAL_DTM_START: NORMAL

## 2022-11-02 NOTE — PROGRESS NOTES
Study description: [id number] is a randomized, participant and investigator blinded, sponsor open-label, placebo-controlled, single dose study to investigate the safety and tolerability of WLA005 in heart failure participants with reduced ejection fraction      Physical Examination  For abnormal findings, please evaluate if the finding is Clinically Significant (by 'CS') or Not Clinically Significant (by 'NCS')    General Appearance  Normal  Head and Neck  Normal  Lungs    {Normal  Cardiovascular  Normal  Abdomen   Normal - injection site not visible; completely healed  Musculoskeletal/Extremities Abnormal: trace edema bilateral lower extremities   Lymph Nodes   Normal  Skin    Normal- comedone still present, no erythema; unchanged per patient  Neurological   Normal    NYHA [] I [x] II [] III [] IV    Clinical congestion: [x] Absent [] Present   If present, please provide further details    Vitals:    11/01/22 1622 11/01/22 1618 11/01/22 1620   BP: 135/83 (!) 143/85 138/87   BP Location: Left arm Left arm Left arm   Patient Position: Sitting Sitting Sitting   Cuff Size: Adult Regular Adult Regular Adult Regular   Pulse: 73 74 68   Temp:   (!) 96.2  F (35.7  C)   Weight:  87.9 kg (193 lb 12.8 oz)        CBC RESULTS:   Recent Labs   Lab Test 10/22/22  0901   WBC 9.8   RBC 5.69   HGB 16.8   HCT 49.7   MCV 87   MCH 29.5   MCHC 33.8   RDW 14.1        No results found for: LOLA Orellana PA-C

## 2022-11-03 ENCOUNTER — TRANSFERRED RECORDS (OUTPATIENT)
Dept: CARDIOLOGY | Facility: CLINIC | Age: 57
End: 2022-11-03

## 2022-11-03 NOTE — PROGRESS NOTES
October 22 labs reviewed and all ncs including LDH, bili AST, hematocrit and K, but would recheck locally in 1 week.  LF

## 2022-11-04 NOTE — PROGRESS NOTES
November 1 research labs ncs including high Cl, high CK, and high GGTP.  Also NCS are high lueko, lymp and eos.  LF

## 2022-11-14 ENCOUNTER — OFFICE VISIT (OUTPATIENT)
Dept: CARDIOLOGY | Facility: CLINIC | Age: 57
End: 2022-11-14

## 2022-11-14 VITALS
TEMPERATURE: 98.7 F | SYSTOLIC BLOOD PRESSURE: 141 MMHG | HEIGHT: 68 IN | WEIGHT: 199 LBS | OXYGEN SATURATION: 95 % | BODY MASS INDEX: 30.16 KG/M2 | HEART RATE: 67 BPM | RESPIRATION RATE: 16 BRPM | DIASTOLIC BLOOD PRESSURE: 83 MMHG

## 2022-11-14 DIAGNOSIS — Z00.6 EXAM FOR CLINICAL RESEARCH: Primary | ICD-10-CM

## 2022-11-14 DIAGNOSIS — I50.22 CHRONIC SYSTOLIC HEART FAILURE (H): ICD-10-CM

## 2022-11-14 DIAGNOSIS — I25.83 CORONARY ARTERY DISEASE DUE TO LIPID RICH PLAQUE: ICD-10-CM

## 2022-11-14 DIAGNOSIS — I25.5 ISCHEMIC CARDIOMYOPATHY: ICD-10-CM

## 2022-11-14 DIAGNOSIS — I25.10 CORONARY ARTERY DISEASE DUE TO LIPID RICH PLAQUE: ICD-10-CM

## 2022-11-14 PROCEDURE — 93000 ELECTROCARDIOGRAM COMPLETE: CPT | Performed by: INTERNAL MEDICINE

## 2022-11-14 PROCEDURE — 99207 PR NO CHARGE-RESEARCH SERVICE: CPT

## 2022-11-14 NOTE — PROGRESS NOTES
"  Study description: [id number] is a randomized, participant and investigator blinded, sponsor open-label, placebo-controlled, single dose study to investigate the safety and tolerability of TXY041 in heart failure participants with reduced ejection fraction      Physical Examination  For abnormal findings, please evaluate if the finding is Clinically Significant (by 'CS') or Not Clinically Significant (by 'NCS')    General Appearance  Normal  Head and Neck  Normal  Lungs    Normal  Cardiovascular  Normal  Abdomen   Normal  Musculoskeletal/Extremities Normal   Lymph Nodes   Normal  Skin    Normal  Neurological   Normal    NYHA [] I [x] II [] III [] IV    Clinical congestion: [x] Absent [] Present   If present, please provide further details    Vitals:    11/14/22 1611   BP: 136/82   BP Location: Left arm   Patient Position: Sitting   Cuff Size: Adult Regular   Pulse: 68   Resp: 16   Weight: 90.3 kg (199 lb)   Height: 1.715 m (5' 7.5\")       CBC RESULTS:   Recent Labs   Lab Test 10/22/22  0901   WBC 9.8   RBC 5.69   HGB 16.8   HCT 49.7   MCV 87   MCH 29.5   MCHC 33.8   RDW 14.1          Lacey Orellana PA-C  "

## 2022-11-14 NOTE — LETTER
Date:November 18, 2022      Provider requested that no letter be sent. Do not send.       [facility]

## 2022-11-14 NOTE — LETTER
"11/14/2022    Physician No Ref-Primary  No address on file    RE: Daniele Becker       Dear Colleague,     I had the pleasure of seeing Daniele Becker in the [facility].  .    Study description: [id number] is a randomized, participant and investigator blinded, sponsor open-label, placebo-controlled, single dose study to investigate the safety and tolerability of MIQ110 in heart failure participants with reduced ejection fraction      Subject is still eligible and happy to continue in trial.    Subject seen by provider CRESENCIO Umanzor for study related physical exam.    NYHA Classification Completed Yes    Central Labs done at 1602    Clinical congestion score Completed Yes    ECG done and evaluated at 1646  See separate page for Pam's formula calculations      Vitals:    11/14/22 1611 11/14/22 1651 11/14/22 1653 11/14/22 1655   BP: 136/82 (!) 140/82 (!) 140/84 (!) 141/83   BP Location: Left arm Left arm Left arm Left arm   Patient Position: Sitting Sitting Sitting Sitting   Cuff Size: Adult Regular Adult Regular Adult Regular Adult Regular   Pulse: 68 66 67 67   Resp: 16      Temp:  98.7  F (37.1  C)     TempSrc:  Oral     SpO2:  96% 97% 95%   Weight: 90.3 kg (199 lb)      Height: 1.715 m (5' 7.5\")        (x3) including temperature  Seated in chair @1644    Urinalysis collected? Yes  [id number]    PK collected? Yes  Time 1602    Blood cGMP-PD  collected? YesTime 1602    Spot Urine cGMP  obtained? Yes [id number]    Exploratory Biomarkers Plasma (ANP and BNP) obtained? YesTime 1602      Any AE/JOSEE: none reported or elicited    Jacy Fox RN, BSN  Clinical Trials Nurse        Current Outpatient Medications:      aspirin 81 mg chewable tablet, [ASPIRIN 81 MG CHEWABLE TABLET] Chew 1 tablet (81 mg total) daily., Disp: , Rfl: 0     atorvastatin (LIPITOR) 80 MG tablet, Take 1 tablet (80 mg) by mouth daily, Disp: 90 tablet, Rfl: 4     carvedilol (COREG) 25 MG tablet, Take 18.75 mg by " "mouth 2 times daily (with meals), Started 08NOV2022     lisinopril (ZESTRIL) 2.5 MG tablet, Take 1 tablet (2.5 mg) by mouth daily, Disp: 90 tablet, Rfl: 4     lisinopril (ZESTRIL) 5 MG tablet, Take 1 tablet (5 mg) by mouth daily, Disp: 90 tablet, Rfl: 4     furosemide (LASIX) 20 MG tablet, [FUROSEMIDE (LASIX) 20 MG TABLET] Take 0.5 tablets (10 mg total) by mouth as needed (For fluid retention, sob). (Patient not taking: Reported on 11/14/2022), Disp: 30 tablet, Rfl: 1     naproxen sodium 220 MG capsule, Take 220 mg by mouth as needed (Patient not taking: Reported on 11/14/2022), Disp: , Rfl:       Study description: [id number] is a randomized, participant and investigator blinded, sponsor open-label, placebo-controlled, single dose study to investigate the safety and tolerability of RZK039 in heart failure participants with reduced ejection fraction      Physical Examination  For abnormal findings, please evaluate if the finding is Clinically Significant (by 'CS') or Not Clinically Significant (by 'NCS')    General Appearance  Normal  Head and Neck  Normal  Lungs    Normal  Cardiovascular  Normal  Abdomen   Normal  Musculoskeletal/Extremities Normal   Lymph Nodes   Normal  Skin    Normal  Neurological   Normal    NYHA [] I [x] II [] III [] IV    Clinical congestion: [x] Absent [] Present   If present, please provide further details    Vitals:    11/14/22 1611   BP: 136/82   BP Location: Left arm   Patient Position: Sitting   Cuff Size: Adult Regular   Pulse: 68   Resp: 16   Weight: 90.3 kg (199 lb)   Height: 1.715 m (5' 7.5\")       CBC RESULTS:   Recent Labs   Lab Test 10/22/22  0901   WBC 9.8   RBC 5.69   HGB 16.8   HCT 49.7   MCV 87   MCH 29.5   MCHC 33.8   RDW 14.1          Lacey Orellana PA-C      Thank you for allowing me to participate in the care of your patient.      Sincerely,     Jacy Fox RN     [facility]  cc:   No " referring provider defined for this encounter.

## 2022-11-14 NOTE — PROGRESS NOTES
".    Study description: [id number] is a randomized, participant and investigator blinded, sponsor open-label, placebo-controlled, single dose study to investigate the safety and tolerability of YNS651 in heart failure participants with reduced ejection fraction      Subject is still eligible and happy to continue in trial.    Subject seen by provider CRESENCIO Umanzor for study related physical exam.    NYHA Classification Completed Yes    Central Labs done at 1602    Clinical congestion score Completed Yes    ECG done and evaluated at 1646  See separate page for Pam's formula calculations      Vitals:    11/14/22 1611 11/14/22 1651 11/14/22 1653 11/14/22 1655   BP: 136/82 (!) 140/82 (!) 140/84 (!) 141/83   BP Location: Left arm Left arm Left arm Left arm   Patient Position: Sitting Sitting Sitting Sitting   Cuff Size: Adult Regular Adult Regular Adult Regular Adult Regular   Pulse: 68 66 67 67   Resp: 16      Temp:  98.7  F (37.1  C)     TempSrc:  Oral     SpO2:  96% 97% 95%   Weight: 90.3 kg (199 lb)      Height: 1.715 m (5' 7.5\")        (x3) including temperature  Seated in chair @1644    Urinalysis collected? Yes  [id number]    PK collected? Yes  Time 1602    Blood cGMP-PD  collected? YesTime 1602    Spot Urine cGMP  obtained? Yes [id number]    Exploratory Biomarkers Plasma (ANP and BNP) obtained? YesTime 1602      Any AE/JOSEE: none reported or elicited    Jacy Fox RN, BSN  Clinical Trials Nurse        Current Outpatient Medications:      aspirin 81 mg chewable tablet, [ASPIRIN 81 MG CHEWABLE TABLET] Chew 1 tablet (81 mg total) daily., Disp: , Rfl: 0     atorvastatin (LIPITOR) 80 MG tablet, Take 1 tablet (80 mg) by mouth daily, Disp: 90 tablet, Rfl: 4     carvedilol (COREG) 25 MG tablet, Take 18.75 mg by mouth 2 times daily (with meals), Started 08NOV2022     lisinopril (ZESTRIL) 2.5 MG tablet, Take 1 tablet (2.5 mg) by mouth daily, Disp: 90 tablet, Rfl: 4     lisinopril (ZESTRIL) 5 MG tablet, " Take 1 tablet (5 mg) by mouth daily, Disp: 90 tablet, Rfl: 4     furosemide (LASIX) 20 MG tablet, [FUROSEMIDE (LASIX) 20 MG TABLET] Take 0.5 tablets (10 mg total) by mouth as needed (For fluid retention, sob). (Patient not taking: Reported on 11/14/2022), Disp: 30 tablet, Rfl: 1     naproxen sodium 220 MG capsule, Take 220 mg by mouth as needed (Patient not taking: Reported on 11/14/2022), Disp: , Rfl:

## 2022-11-15 LAB
ATRIAL RATE - MUSE: 63 BPM
DIASTOLIC BLOOD PRESSURE - MUSE: NORMAL MMHG
INTERPRETATION ECG - MUSE: NORMAL
P AXIS - MUSE: 37 DEGREES
PR INTERVAL - MUSE: 180 MS
QRS DURATION - MUSE: 86 MS
QT - MUSE: 402 MS
QTC - MUSE: 411 MS
R AXIS - MUSE: -73 DEGREES
SYSTOLIC BLOOD PRESSURE - MUSE: NORMAL MMHG
T AXIS - MUSE: 34 DEGREES
VENTRICULAR RATE- MUSE: 63 BPM

## 2022-11-17 ENCOUNTER — TRANSFERRED RECORDS (OUTPATIENT)
Dept: CARDIOLOGY | Facility: CLINIC | Age: 57
End: 2022-11-17

## 2022-11-17 NOTE — PROGRESS NOTES
Research laboratory data from November 14 reviewed, no data attached, this is not clinically significant.  LF

## 2022-11-18 ENCOUNTER — TRANSFERRED RECORDS (OUTPATIENT)
Dept: CARDIOLOGY | Facility: CLINIC | Age: 57
End: 2022-11-18

## 2022-11-18 ENCOUNTER — MYC MEDICAL ADVICE (OUTPATIENT)
Dept: CARDIOLOGY | Facility: CLINIC | Age: 57
End: 2022-11-18

## 2022-11-18 NOTE — TELEPHONE ENCOUNTER
Dr. Israel reviewed reported BP& HR taken at home and recommends taking Carvedilol 25 mg by mouth tiwce a day and confirming that you are taking Lisinopril 7.5 mg daily.   Jacy Fox RN, BSN  Clinical Trials Nurse

## 2022-11-18 NOTE — PROGRESS NOTES
Research labs drawn on November 14 reviewed, results not clinically significant including alkaline phosphatase of 153, GGTP 111, chloride of 108, and CK of 322 as well as total bilirubin of 1.57 and direct bilirubin of 0.63.  These are all slightly elevated and not clinically significant.  LF

## 2022-11-21 ENCOUNTER — HEALTH MAINTENANCE LETTER (OUTPATIENT)
Age: 57
End: 2022-11-21

## 2022-12-12 ENCOUNTER — OFFICE VISIT (OUTPATIENT)
Dept: CARDIOLOGY | Facility: CLINIC | Age: 57
End: 2022-12-12

## 2022-12-12 VITALS
WEIGHT: 199 LBS | DIASTOLIC BLOOD PRESSURE: 79 MMHG | BODY MASS INDEX: 30.16 KG/M2 | SYSTOLIC BLOOD PRESSURE: 131 MMHG | HEIGHT: 68 IN | HEART RATE: 60 BPM | RESPIRATION RATE: 16 BRPM

## 2022-12-12 DIAGNOSIS — Z00.6 EXAM FOR CLINICAL RESEARCH: Primary | ICD-10-CM

## 2022-12-12 PROCEDURE — 99207 PR NO CHARGE-RESEARCH SERVICE: CPT

## 2022-12-12 NOTE — LETTER
Date:December 13, 2022      Provider requested that no letter be sent. Do not send.       [facility]

## 2022-12-12 NOTE — PROGRESS NOTES
Study description: [id number] is a randomized, participant and investigator blinded, sponsor open-label, placebo-controlled, single dose study to investigate the safety and tolerability of [id number] in heart failure participants with reduced ejection fraction    Subject seen in clinic today for visit week 8    Subject is still eligible and happy to continue in trial.    Subject seen by provider Esteban for study related physical exam.      Central Labs done at 15.42      PK collected? Yes  Time 15.42    Blood cGMP-PD  collected? Yes  Time 15.42    Spot Urine cGMP  obtained? Yes  Time 15.45    Exploratory Biomarkers Plasma (ANP and BNP) obtained? Yes   Time 15.42    Banked Biomarker plasma pre dose Collected?   Time 15.42      No AE/JOSEE or changes to conmeds reported    Gini Mccann RN        Current Outpatient Medications:      aspirin 81 mg chewable tablet, [ASPIRIN 81 MG CHEWABLE TABLET] Chew 1 tablet (81 mg total) daily., Disp: , Rfl: 0     atorvastatin (LIPITOR) 80 MG tablet, Take 1 tablet (80 mg) by mouth daily, Disp: 90 tablet, Rfl: 4     carvedilol (COREG) 25 MG tablet, Take 18.25 mg by mouth 2 times daily (with meals), Disp: , Rfl:      lisinopril (ZESTRIL) 2.5 MG tablet, Take 1 tablet (2.5 mg) by mouth daily, Disp: 90 tablet, Rfl: 4     lisinopril (ZESTRIL) 5 MG tablet, Take 1 tablet (5 mg) by mouth daily, Disp: 90 tablet, Rfl: 4     naproxen sodium 220 MG capsule, Take 220 mg by mouth as needed, Disp: , Rfl:      furosemide (LASIX) 20 MG tablet, [FUROSEMIDE (LASIX) 20 MG TABLET] Take 0.5 tablets (10 mg total) by mouth as needed (For fluid retention, sob). (Patient not taking: Reported on 11/14/2022), Disp: 30 tablet, Rfl: 1

## 2022-12-12 NOTE — LETTER
12/12/2022    Physician No Ref-Primary  No address on file    RE: Daniele Becker       Dear Colleague,     I had the pleasure of seeing Daniele Becker in the [facility].    Study description: [id number] is a randomized, participant and investigator blinded, sponsor open-label, placebo-controlled, single dose study to investigate the safety and tolerability of [id number] in heart failure participants with reduced ejection fraction    Subject seen in clinic today for visit week 8    Subject is still eligible and happy to continue in trial.    Subject seen by provider Esteban for study related physical exam.      Central Labs done at 15.42      PK collected? Yes  Time 15.42    Blood cGMP-PD  collected? Yes  Time 15.42    Spot Urine cGMP  obtained? Yes  Time 15.45    Exploratory Biomarkers Plasma (ANP and BNP) obtained? Yes   Time 15.42    Banked Biomarker plasma pre dose Collected?   Time 15.42      No AE/JOSEE or changes to conmeds reported    Gini Mccann RN        Current Outpatient Medications:      aspirin 81 mg chewable tablet, [ASPIRIN 81 MG CHEWABLE TABLET] Chew 1 tablet (81 mg total) daily., Disp: , Rfl: 0     atorvastatin (LIPITOR) 80 MG tablet, Take 1 tablet (80 mg) by mouth daily, Disp: 90 tablet, Rfl: 4     carvedilol (COREG) 25 MG tablet, Take 18.25 mg by mouth 2 times daily (with meals), Disp: , Rfl:      lisinopril (ZESTRIL) 2.5 MG tablet, Take 1 tablet (2.5 mg) by mouth daily, Disp: 90 tablet, Rfl: 4     lisinopril (ZESTRIL) 5 MG tablet, Take 1 tablet (5 mg) by mouth daily, Disp: 90 tablet, Rfl: 4     naproxen sodium 220 MG capsule, Take 220 mg by mouth as needed, Disp: , Rfl:      furosemide (LASIX) 20 MG tablet, [FUROSEMIDE (LASIX) 20 MG TABLET] Take 0.5 tablets (10 mg total) by mouth as needed (For fluid retention, sob). (Patient not taking: Reported on 11/14/2022), Disp: 30 tablet, Rfl: 1        Study description: [id number] is a randomized, participant and investigator  "blinded, sponsor open-label, placebo-controlled, single dose study to investigate the safety and tolerability of QMY486 in heart failure participants with reduced ejection fraction      Physical Examination  For abnormal findings, please evaluate if the finding is Clinically Significant (by 'CS') or Not Clinically Significant (by 'NCS')    General Appearance  Normal  Head and Neck  Normal  Lungs    Normal  Cardiovascular  Normal  Abdomen   Normal  Musculoskeletal/Extremities Abnormal: NCS; trace edema left lower extremity   Lymph Nodes   Normal  Skin    Normal  Neurological   Normal    NYHA [] I [x] II [] III [] IV    Clinical congestion: [x] Absent [] Present   If present, please provide further details    Vitals:    12/12/22 1534 12/12/22 1550 12/12/22 1552   BP: 143/79 139/81 131/79   BP Location: Right arm Left arm Left arm   Patient Position: Sitting Sitting Sitting   Cuff Size: Adult Regular Adult Regular Adult Large   Pulse: 62 61 60   Resp: 16     Weight: 90.3 kg (199 lb)     Height: 1.715 m (5' 7.5\")         CBC RESULTS:   Recent Labs   Lab Test 10/22/22  0901   WBC 9.8   RBC 5.69   HGB 16.8   HCT 49.7   MCV 87   MCH 29.5   MCHC 33.8   RDW 14.1            Lacey Orellana PA-C      Thank you for allowing me to participate in the care of your patient.      Sincerely,     Gini Mccann RN     [facility]  cc:   No referring provider defined for this encounter.        "

## 2022-12-13 NOTE — PROGRESS NOTES
"  Study description: [id number] is a randomized, participant and investigator blinded, sponsor open-label, placebo-controlled, single dose study to investigate the safety and tolerability of WMY237 in heart failure participants with reduced ejection fraction      Physical Examination  For abnormal findings, please evaluate if the finding is Clinically Significant (by 'CS') or Not Clinically Significant (by 'NCS')    General Appearance  Normal  Head and Neck  Normal  Lungs    Normal  Cardiovascular  Normal  Abdomen   Normal  Musculoskeletal/Extremities Abnormal: NCS; trace edema left lower extremity   Lymph Nodes   Normal  Skin    Normal  Neurological   Normal    NYHA [] I [x] II [] III [] IV    Clinical congestion: [x] Absent [] Present   If present, please provide further details    Vitals:    12/12/22 1534 12/12/22 1550 12/12/22 1552   BP: 143/79 139/81 131/79   BP Location: Right arm Left arm Left arm   Patient Position: Sitting Sitting Sitting   Cuff Size: Adult Regular Adult Regular Adult Large   Pulse: 62 61 60   Resp: 16     Weight: 90.3 kg (199 lb)     Height: 1.715 m (5' 7.5\")         CBC RESULTS:   Recent Labs   Lab Test 10/22/22  0901   WBC 9.8   RBC 5.69   HGB 16.8   HCT 49.7   MCV 87   MCH 29.5   MCHC 33.8   RDW 14.1            Lacey Orellana PA-C  "

## 2023-01-02 ENCOUNTER — DOCUMENTATION ONLY (OUTPATIENT)
Dept: CARDIOLOGY | Facility: CLINIC | Age: 58
End: 2023-01-02

## 2023-01-02 DIAGNOSIS — I25.5 ISCHEMIC CARDIOMYOPATHY: Primary | ICD-10-CM

## 2023-01-02 RX ORDER — LIDOCAINE 40 MG/G
CREAM TOPICAL
Status: CANCELLED | OUTPATIENT
Start: 2023-01-02

## 2023-01-02 RX ORDER — SODIUM CHLORIDE 9 MG/ML
100 INJECTION, SOLUTION INTRAVENOUS CONTINUOUS
Status: CANCELLED | OUTPATIENT
Start: 2023-03-23

## 2023-01-02 RX ORDER — FENTANYL CITRATE 50 UG/ML
25 INJECTION, SOLUTION INTRAMUSCULAR; INTRAVENOUS
Status: CANCELLED | OUTPATIENT
Start: 2023-03-23

## 2023-01-02 NOTE — PROGRESS NOTES
H&P  PMD: []  Received [] Card OV: []  Date:  Teach  []   Orders  I [x] P  [x]  Letter []   AC: None- NA All other AM Meds: hold lasix     1965  Home:[phone] (home) Cell:[phone] (mobile)  Emergency Contact: ALLI JOSE   PCP: No Ref-Primary, Physician, None      Important patient information for CSC/Cath Lab staff : None    [facility] EP Cath Lab Procedure Order     Device Implant/Revision:  Procedure: Generator Change Out  Current Device/Device Co Needed for Procedure: Single BlueSpace  Ordering Provider: Dr Vu  Ordering Date: 1/2/2023  Diagnosis:  Generator Change- Device at [facility]  Scheduling Timeframe:  Urgent-Will need next open spot  Scheduling Restrictions: None  Scheduling Contact: Please contact pt to schedule, if you are unable to schedule date within the next 24 hours please contact pt to update on scheduling process  Scheduling Follow-up apt for NEW HIS/CRT Implants: NA  Pre-Procedural Testing needed: None  Anesthesia:  Conscious Sedation- CV RN to administer    [facility] EP Cath Lab Prep   H&P:  Pt to schedule with PMD to complete  Pre-op Labs: CBC, BMP, Beta HcG if appropriate, and INR if on Warfarin will be ordered AM of procedure and Review of most recent labs, WEL for procedure  T&S Pre-Procedure Review: T&S is not required for procedure  Medical Records Pertinent for Procedure:  Device Implant Record 7/6/18  Allergies: Reviewed allergies, no concerns regarding orders for procedure    Allergies   Allergen Reactions     No Known Drug Allergies Unknown     Orange Flavor [Flavoring Agent] Rash     Lopez  1st Noted as child       Current Outpatient Medications:      aspirin 81 mg chewable tablet, [ASPIRIN 81 MG CHEWABLE TABLET] Chew 1 tablet (81 mg total) daily., Disp: , Rfl: 0     atorvastatin (LIPITOR) 80 MG tablet, Take 1 tablet (80 mg) by mouth daily, Disp: 90 tablet, Rfl: 4     carvedilol (COREG) 25 MG tablet, Take 18.25 mg by mouth 2 times daily (with meals), Disp: , Rfl:       furosemide (LASIX) 20 MG tablet, [FUROSEMIDE (LASIX) 20 MG TABLET] Take 0.5 tablets (10 mg total) by mouth as needed (For fluid retention, sob). (Patient not taking: Reported on 11/14/2022), Disp: 30 tablet, Rfl: 1     lisinopril (ZESTRIL) 2.5 MG tablet, Take 1 tablet (2.5 mg) by mouth daily, Disp: 90 tablet, Rfl: 4     lisinopril (ZESTRIL) 5 MG tablet, Take 1 tablet (5 mg) by mouth daily, Disp: 90 tablet, Rfl: 4     naproxen sodium 220 MG capsule, Take 220 mg by mouth as needed, Disp: , Rfl:     Documentation Date:1/2/2023 10:38 AM  Azucena Bobby RN

## 2023-01-02 NOTE — TELEPHONE ENCOUNTER
EP MD/EP NC MARTIR Review  Dr Vu reviewed MARTIR checklist  Reviewed by Azucena Bobby, RN 1/2/2023 10:40 AM    Order for procedure placed in EPIC and  [facility]

## 2023-01-16 ENCOUNTER — OFFICE VISIT (OUTPATIENT)
Dept: CARDIOLOGY | Facility: CLINIC | Age: 58
End: 2023-01-16

## 2023-01-16 VITALS
RESPIRATION RATE: 16 BRPM | DIASTOLIC BLOOD PRESSURE: 88 MMHG | HEIGHT: 68 IN | BODY MASS INDEX: 30.31 KG/M2 | HEART RATE: 67 BPM | WEIGHT: 200 LBS | SYSTOLIC BLOOD PRESSURE: 140 MMHG

## 2023-01-16 DIAGNOSIS — Z00.6 EXAM FOR CLINICAL RESEARCH: Primary | ICD-10-CM

## 2023-01-16 LAB
ALBUMIN MFR UR ELPH: 7.3 MG/DL (ref 1–14)
CREAT UR-MCNC: 102.1 MG/DL
PROT/CREAT 24H UR: 0.07 MG/MG CR (ref 0–0.2)

## 2023-01-16 PROCEDURE — 84156 ASSAY OF PROTEIN URINE: CPT

## 2023-01-16 PROCEDURE — 93000 ELECTROCARDIOGRAM COMPLETE: CPT | Performed by: GENERAL ACUTE CARE HOSPITAL

## 2023-01-16 PROCEDURE — 99207 PR NO CHARGE-RESEARCH SERVICE: CPT

## 2023-01-16 NOTE — PROGRESS NOTES
"  Study description: [id number] is a randomized, participant and investigator blinded, sponsor open-label, placebo-controlled, single dose study to investigate the safety and tolerability of JFM154 in heart failure participants with reduced ejection fraction    Subject see in clinic today for EOS    Subject seen by provider JUAN Orellana for study related physical exam.    NYHA Classification II     Central Labs done at 16.10    Serum pregnancy test NA    Clinical congestion score 0    Vitals:    01/16/23 1638 01/16/23 1640 01/16/23 1642   BP: 134/78 138/83  140/88   BP Location: Left arm Left arm Left arm   Patient Position: Sitting Sitting Sitting   Cuff Size: Adult Large Adult Regular Adult Regular   Pulse: 66 67 67   Resp: 16     Weight: 90.7 kg (200 lb)     Height: 1.715 m (5' 7.5\")       Temparature 98 degree Fahrenheit (36.7 degrees celsius)  ECG done and evaluated at 16.34  See separate page for Pam's formula calculations    Urine for PCR testing collected? Yes  Time 16.00    PK collected? Yes  Time 16.10    Blood cGMP-PD  collected? Yes Time 16.10    Spot Urine cGMP  obtained? Yes Time 16.00    Exploratory Biomarkers Plasma (ANP and BNP) obtained? Yes Time 16.10    Banked Biomarker plasma pre dose Collected? Yes Time 16.10        Any AE/JOSEE  AE of influenza from 16 Dec 2022 to 20 Dec 2022  Took Tylenol 1g (twice only) on 18 Dec 2022        Gini Mccann RN        Current Outpatient Medications:      aspirin 81 mg chewable tablet, [ASPIRIN 81 MG CHEWABLE TABLET] Chew 1 tablet (81 mg total) daily., Disp: , Rfl: 0     atorvastatin (LIPITOR) 80 MG tablet, Take 1 tablet (80 mg) by mouth daily, Disp: 90 tablet, Rfl: 4     carvedilol (COREG) 25 MG tablet, Take 25 mg by mouth 2 times daily (with meals), Disp: , Rfl:      lisinopril (ZESTRIL) 2.5 MG tablet, Take 1 tablet (2.5 mg) by mouth daily, Disp: 90 tablet, Rfl: 4     lisinopril (ZESTRIL) 5 MG tablet, Take 1 tablet (5 mg) by mouth daily, Disp: 90 " tablet, Rfl: 4     furosemide (LASIX) 20 MG tablet, [FUROSEMIDE (LASIX) 20 MG TABLET] Take 0.5 tablets (10 mg total) by mouth as needed (For fluid retention, sob). (Patient not taking: Reported on 1/16/2023), Disp: 30 tablet, Rfl: 1     naproxen sodium 220 MG capsule, Take 220 mg by mouth as needed (Patient not taking: Reported on 1/16/2023), Disp: , Rfl:

## 2023-01-16 NOTE — LETTER
"1/16/2023    Physician No Ref-Primary  No address on file    RE: Daniele Becker       Dear Colleague,     I had the pleasure of seeing Daniele Becker in the [facility].    Study description: [id number] is a randomized, participant and investigator blinded, sponsor open-label, placebo-controlled, single dose study to investigate the safety and tolerability of HMT403 in heart failure participants with reduced ejection fraction    Subject see in clinic today for EOS    Subject seen by provider JUAN Orellana for study related physical exam.    NYHA Classification II     Central Labs done at 16.10    Serum pregnancy test NA    Clinical congestion score 0    Vitals:    01/16/23 1638 01/16/23 1640 01/16/23 1642   BP: 134/78 138/83  140/88   BP Location: Left arm Left arm Left arm   Patient Position: Sitting Sitting Sitting   Cuff Size: Adult Large Adult Regular Adult Regular   Pulse: 66 67 67   Resp: 16     Weight: 90.7 kg (200 lb)     Height: 1.715 m (5' 7.5\")       Temparature 98 degree Fahrenheit (36.7 degrees celsius)  ECG done and evaluated at 16.34  See separate page for Pam's formula calculations    Urine for PCR testing collected? Yes  Time 16.00    PK collected? Yes  Time 16.10    Blood cGMP-PD  collected? Yes Time 16.10    Spot Urine cGMP  obtained? Yes Time 16.00    Exploratory Biomarkers Plasma (ANP and BNP) obtained? Yes Time 16.10    Banked Biomarker plasma pre dose Collected? Yes Time 16.10        Any AE/JOSEE  AE of influenza from 16 Dec 2022 to 20 Dec 2022  Took Tylenol 1g (twice only) on 18 Dec 2022        Gini Mccann RN        Current Outpatient Medications:      aspirin 81 mg chewable tablet, [ASPIRIN 81 MG CHEWABLE TABLET] Chew 1 tablet (81 mg total) daily., Disp: , Rfl: 0     atorvastatin (LIPITOR) 80 MG tablet, Take 1 tablet (80 mg) by mouth daily, Disp: 90 tablet, Rfl: 4     carvedilol (COREG) 25 MG tablet, Take 25 mg by mouth 2 times daily (with meals), Disp: , Rfl: " "     lisinopril (ZESTRIL) 2.5 MG tablet, Take 1 tablet (2.5 mg) by mouth daily, Disp: 90 tablet, Rfl: 4     lisinopril (ZESTRIL) 5 MG tablet, Take 1 tablet (5 mg) by mouth daily, Disp: 90 tablet, Rfl: 4     furosemide (LASIX) 20 MG tablet, [FUROSEMIDE (LASIX) 20 MG TABLET] Take 0.5 tablets (10 mg total) by mouth as needed (For fluid retention, sob). (Patient not taking: Reported on 1/16/2023), Disp: 30 tablet, Rfl: 1     naproxen sodium 220 MG capsule, Take 220 mg by mouth as needed (Patient not taking: Reported on 1/16/2023), Disp: , Rfl:       Study description: [id number] is a randomized, participant and investigator blinded, sponsor open-label, placebo-controlled, single dose study to investigate the safety and tolerability of OVM654 in heart failure participants with reduced ejection fraction      Physical Examination  For abnormal findings, please evaluate if the finding is Clinically Significant (by 'CS') or Not Clinically Significant (by 'NCS')    General Appearance  Normal  Head and Neck  Normal  Lungs    Normal  Cardiovascular  Normal  Abdomen   Normal  Musculoskeletal/Extremities Normal   Lymph Nodes   Normal  Skin    Normal  Neurological   Normal    NYHA [] I [x] II [] III [] IV    Clinical congestion: [x] Absent [] Present   If present, please provide further details    Vitals:    01/16/23 1638 01/16/23 1640 01/16/23 1642   BP: 134/78 138/83 140/88   BP Location: Left arm Left arm Left arm   Patient Position: Sitting Sitting Sitting   Cuff Size: Adult Large Adult Regular Adult Regular   Pulse: 66 67 67   Resp: 16     Weight: 90.7 kg (200 lb)     Height: 1.715 m (5' 7.5\")         CBC RESULTS:   Recent Labs   Lab Test 10/22/22  0901   WBC 9.8   RBC 5.69   HGB 16.8   HCT 49.7   MCV 87   MCH 29.5   MCHC 33.8   RDW 14.1            Lacey Orellana PA-C      Study description: [id number] is a randomized, participant and investigator blinded, sponsor open-label, placebo-controlled, single " dose study to investigate the safety and tolerability of DYC265 in heart failure participants with reduced ejection fraction    Diagnosis/event description (diagnosis preferred):  Influenza  Start date: 16 Dec 2022   Time: 18.00  Date resolved: 20 Dec 2022 Time:08.00  Date study team became aware of event: 16 Dec 2022    Dr. Israel: Reasonable possibility that AE is related to investigational product    [] Yes   [] No    Severity: ([x] mild /[]  moderate / [] severe)     Serious adverse event?   [] Yes   [x] No     If yes:  Date AE met criteria for JOSEE:      Date study team aware of JOSEE: NA    AE is serious due to:   [] Death    [] Life Threatening   [] Inpatient hospitalization or prolongation of existing inpatient hospitalization    [] Persistent or significant disability/incapacity  [] Congenital anomaly or birth defect    [] Important medical event    [] JOSEE caused by other medication     Which medication:    [] JOSEE caused by study procedure     Which procedure(s):      Action taken with the investigational product:   [] Dose not changed   [] Drug interrupted   [] Drug permanently discontinued   [x] Not applicable    Did AE lead to study discontinuation: NA  Outcome: [x] Recovered/Resolved [] Recovering / Resolving    [] Recovered/Resolved with sequelae []  Not Recovered/Not Resolved [] Fatal      Is this a potential Heart Failure Event? [] Yes   [x] No        Gini Mccann RN                Thank you for allowing me to participate in the care of your patient.      Sincerely,     Jacy Fox RN     [facility]  cc:   No referring provider defined for this encounter.

## 2023-01-16 NOTE — PROGRESS NOTES
"  Study description: [id number] is a randomized, participant and investigator blinded, sponsor open-label, placebo-controlled, single dose study to investigate the safety and tolerability of [id number] in heart failure participants with reduced ejection fraction      Physical Examination  For abnormal findings, please evaluate if the finding is Clinically Significant (by 'CS') or Not Clinically Significant (by 'NCS')    General Appearance  Normal  Head and Neck  Normal  Lungs    Normal  Cardiovascular  Normal  Abdomen   Normal  Musculoskeletal/Extremities Normal   Lymph Nodes   Normal  Skin    Normal  Neurological   Normal    NYHA [] I [x] II [] III [] IV    Clinical congestion: [x] Absent [] Present   If present, please provide further details    Vitals:    01/16/23 1638 01/16/23 1640 01/16/23 1642   BP: 134/78 138/83 140/88   BP Location: Left arm Left arm Left arm   Patient Position: Sitting Sitting Sitting   Cuff Size: Adult Large Adult Regular Adult Regular   Pulse: 66 67 67   Resp: 16     Weight: 90.7 kg (200 lb)     Height: 1.715 m (5' 7.5\")         CBC RESULTS:   Recent Labs   Lab Test 10/22/22  0901   WBC 9.8   RBC 5.69   HGB 16.8   HCT 49.7   MCV 87   MCH 29.5   MCHC 33.8   RDW 14.1            Lacey Orellana PA-C  "

## 2023-01-16 NOTE — LETTER
Date:January 17, 2023      Provider requested that no letter be sent. Do not send.       [facility]

## 2023-01-16 NOTE — PROGRESS NOTES
Study description: [id number] is a randomized, participant and investigator blinded, sponsor open-label, placebo-controlled, single dose study to investigate the safety and tolerability of CMB275 in heart failure participants with reduced ejection fraction    Diagnosis/event description (diagnosis preferred):  Influenza  Start date: 16 Dec 2022   Time: 18.00  Date resolved: 20 Dec 2022 Time:08.00  Date study team became aware of event: 16 Dec 2022    Dr. Israel: Reasonable possibility that AE is related to investigational product    [] Yes   [x] No    Severity: ([x] mild /[]  moderate / [] severe)     Serious adverse event?   [] Yes   [x] No     If yes:  Date AE met criteria for JOSEE:      Date study team aware of JOSEE: NA    AE is serious due to:   [] Death    [] Life Threatening   [] Inpatient hospitalization or prolongation of existing inpatient hospitalization    [] Persistent or significant disability/incapacity  [] Congenital anomaly or birth defect    [] Important medical event    [] JOSEE caused by other medication     Which medication:    [] JOSEE caused by study procedure     Which procedure(s):      Action taken with the investigational product:   [] Dose not changed   [] Drug interrupted   [] Drug permanently discontinued   [x] Not applicable    Did AE lead to study discontinuation: NA  Outcome: [x] Recovered/Resolved [] Recovering / Resolving    [] Recovered/Resolved with sequelae []  Not Recovered/Not Resolved [] Fatal      Is this a potential Heart Failure Event? [] Yes   [x] No        Gini Mccann RN

## 2023-01-17 LAB
ATRIAL RATE - MUSE: 65 BPM
DIASTOLIC BLOOD PRESSURE - MUSE: NORMAL MMHG
INTERPRETATION ECG - MUSE: NORMAL
P AXIS - MUSE: 67 DEGREES
PR INTERVAL - MUSE: 168 MS
QRS DURATION - MUSE: 88 MS
QT - MUSE: 410 MS
QTC - MUSE: 426 MS
R AXIS - MUSE: 269 DEGREES
SYSTOLIC BLOOD PRESSURE - MUSE: NORMAL MMHG
T AXIS - MUSE: 48 DEGREES
VENTRICULAR RATE- MUSE: 65 BPM

## 2023-01-19 ENCOUNTER — TRANSFERRED RECORDS (OUTPATIENT)
Dept: CARDIOLOGY | Facility: CLINIC | Age: 58
End: 2023-01-19

## 2023-01-20 NOTE — PROGRESS NOTES
Research laboratory data from January 16, 2023 reviewed and not clinically significant.  This includes elevated chloride, GGTP, direct total bilirubin, creatinine kinase, neutrophils, eosinophils, leukocytes, and lymphocytes.  LF

## 2023-01-23 ENCOUNTER — TRANSFERRED RECORDS (OUTPATIENT)
Dept: CARDIOLOGY | Facility: CLINIC | Age: 58
End: 2023-01-23

## 2023-01-26 DIAGNOSIS — I25.10 CORONARY ARTERY DISEASE DUE TO LIPID RICH PLAQUE: ICD-10-CM

## 2023-01-26 DIAGNOSIS — I25.83 CORONARY ARTERY DISEASE DUE TO LIPID RICH PLAQUE: ICD-10-CM

## 2023-01-26 DIAGNOSIS — I50.22 CHRONIC SYSTOLIC HEART FAILURE (H): ICD-10-CM

## 2023-01-26 DIAGNOSIS — I25.5 ISCHEMIC CARDIOMYOPATHY: ICD-10-CM

## 2023-01-26 RX ORDER — ATORVASTATIN CALCIUM 80 MG/1
80 TABLET, FILM COATED ORAL DAILY
Qty: 90 TABLET | Refills: 4 | Status: SHIPPED | OUTPATIENT
Start: 2023-01-26 | End: 2024-04-08

## 2023-01-26 RX ORDER — LISINOPRIL 5 MG/1
5 TABLET ORAL DAILY
Qty: 90 TABLET | Refills: 4 | Status: SHIPPED | OUTPATIENT
Start: 2023-01-26 | End: 2024-04-08

## 2023-01-26 RX ORDER — CARVEDILOL 25 MG/1
25 TABLET ORAL 2 TIMES DAILY WITH MEALS
Qty: 180 TABLET | Refills: 4 | Status: SHIPPED | OUTPATIENT
Start: 2023-01-26 | End: 2024-04-08

## 2023-01-26 RX ORDER — LISINOPRIL 2.5 MG/1
2.5 TABLET ORAL DAILY
Qty: 90 TABLET | Refills: 4 | Status: SHIPPED | OUTPATIENT
Start: 2023-01-26 | End: 2024-04-08

## 2023-03-05 NOTE — PROGRESS NOTES
Bed: 32  Expected date:   Expected time:   Means of arrival:   Comments:  H33   Progress Notes by Eliazar Do NP at 12/11/2018  7:50 AM     Author: Eliazar Do NP Service: -- Author Type: Nurse Practitioner    Filed: 12/11/2018  8:32 AM Encounter Date: 12/11/2018 Status: Signed    : Eliazar Do NP (Nurse Practitioner)           Click to link to [facility]     [facility] HEART CARE NOTE      Assessment/Recommendations   Assessment:    1. Ischemic cardiomyopathy with systolic dysfunction, NYHA class II with EF of 28% - s/p ICD: Well compensated.  He has no signs and symptoms of heart failure.  Following low-sodium diet and weight has been stable.  He has tolerated the increased dose of carvedilol.  He continues to have some irritation on the left clavicle incision site but no signs of infections or hematoma noted.     2. Hypotension: Blood Pressure today is 100/72 and heart rate 66.  He is asymptomatic.  Reported his home recorded blood pressure and heart rate has been stable and unchanged.    3.  Coronary artery disease with status post STEMI involving left anterior descending: Known  of proximal LAD with MRI showed no viability.  On aspirin,  Brilinta, and atorvastatin.  He is asymptomatic today today.  He occasionally has nosebleeds but has improved.  Dr. Israel has recommended patient to participate in Myokardia clinical trial.  Patient is going to meet with the research team after my visit.    Plan:  1.  We discussed about further up titration of his beta-blocker which he agreed.  Patient was encouraged to call if experiences lightheadedness or dizziness with low blood pressure.      Heart failure medications:  - Beta blocker therapy with carvedilol increased from 12.5 mg to 15.625 mg twice a day  - ACEI therapy with lisinopril 7.5 mg daily  - Diuretic therapy with furosemide 10 mg every other day    2.  Reinforced low-sodium diet, daily weight, and stay active as tolerated    3.  Planning repeat echo in March/April and follow-up with Dr. Israel    4. Follow  up with Eliazar in 4 weeks in HF clinic     History of Present Illness     Daniele Becker is a 52 y.o. years old with a significant PMH of recent acute MI involving LAD,  of proximal LAD with no viability on MRI,, dyslipidemia, anemia, former tobacco abuse, and ischemic cardiomyopathy with systolic dysfunction who is seen at [facility] for continued follow-up.  His most recent echocardiogram done on 4/5/2018 showed an EF of 28%. He underwent successful placement of single-chamber ICD on 7/6/2018.    During last heart failure clinic visit his carvedilol dose was increased and he has been tolerating well without any adverse effect. He denies fatigue, lightheadedness, shortness of breath, dyspnea on exertion, orthopnea, PND, palpitations, chest pain, abdominal fullness/bloating and lower extremity edema.      He is monitoring home weights which are stable between 194-200 pounds.  He does not participate in regular exercise but his job requires him to stay active and do a lot of physical exertion work.  He works full-time.       Physical Examination Review of Systems   Vitals:    12/11/18 0756   BP: 100/72   Pulse: 66   Resp: 18     Body mass index is 32.1 kg/m .  Wt Readings from Last 3 Encounters:   12/11/18 208 lb (94.3 kg)   11/13/18 205 lb (93 kg)   10/02/18 202 lb 9.6 oz (91.9 kg)       General Appearance:     Alert, cooperative and in no acute distress.   ENT/Mouth: membranes moist, no oral lesions or bleeding gums.      EYES:  no scleral icterus, normal conjunctivae   Neck: no carotid bruits or thyromegaly   Chest/Lungs:   lungs are clear to auscultation, no rales or wheezing, respirations unlabored   Cardiovascular:    Normal first and second heart sounds with no murmurs, rubs, or gallops; the carotid, radial and posterior tibial pulses are intact, Jugular venous pressure flat with no HJR,no edema bilateral lower extremities    Abdomen:  Soft, nontender, nondistended, bowel sounds present    Extremities: no cyanosis or clubbing   Skin: warm, dry.    Neurologic: mood and affect are appropriate, alert and oriented x3      General: WNL  Eyes: WNL  Ears/Nose/Throat: WNL  Lungs: WNL  Heart: WNL  Stomach: WNL  Bladder: WNL  Muscle/Joints: WNL  Skin: WNL  Nervous System: WNL  Mental Health: WNL     Blood: WNL     Medical History  Surgical History Family History Social History   Past Medical History:   Diagnosis Date   ? Chronic systolic heart failure (H) 2018   ? Coronary artery disease    ? Dyslipidemia    ? Hypertension    ? Ischemic cardiomyopathy 2018    LVEF 17% echo May 2018 (anterior, apical, inferior apical akinesis) Coronary angio May 5, 2018:  prox LAD, R >L collaterals attempted PCI (unsuccessful) Cardiac MRI 2018: LVEF 22% transmural MI (nonviable) apical, distal anterior/ anterolateral/ septal/ basal walls   ? Tobacco dependence in remission 2018    Past Surgical History:   Procedure Laterality Date   ? CV CORONARY ANGIOGRAM N/A 2018    Procedure: Coronary Angiogram;  Surgeon: Wilber Darby MD;  Location: [facility];  Service:    ? CV LEFT HEART CATHETERIZATION WO LEFT VETRICULOGRAM Left 2018    Procedure: Left Heart Catheterization Without Left Ventriculogram;  Surgeon: Wilber Darby MD;  Location: [facility];  Service:    ? CYST REMOVAL      neck   ? EP ICD INSERT N/A 2018    Procedure: EP ICD Insert;  Surgeon: Hany Fernández MD;  Location: [facility];  Service:    ? KNEE ARTHROSCOPY      Family History   Problem Relation Age of Onset   ? Anuerysm Father         ' at 76 from rupture of vessel'    Social History     Socioeconomic History   ? Marital status:      Spouse name: Not on file   ? Number of children: Not on file   ? Years of education: Not on file   ? Highest education level: Not on file   Social Needs   ? Financial resource strain: Not on file   ? Food insecurity - worry: Not on file   ? Food  insecurity - inability: Not on file   ? Transportation needs - medical: Not on file   ? Transportation needs - non-medical: Not on file   Occupational History   ? Not on file   Tobacco Use   ? Smoking status: Former Smoker     Types: Cigarettes     Last attempt to quit: 2016     Years since quittin.6   ? Smokeless tobacco: Former User     Types: Snuff   Substance and Sexual Activity   ? Alcohol use: Yes     Comment: last drink 2 weeks ago   ? Drug use: No   ? Sexual activity: Not on file   Other Topics Concern   ? Not on file   Social History Narrative    Works at [employer].  and lives with wife.     Has grandchildren and wife babysits them.        Eun Castro MD  2018        No medical insurance yet and this has been a barrier in getting adequate cares.  Unable to get a LifeVest as they have to pay out of pocket.    Followed by  at [facility].        Eun Castro MD  2018                      Medications  Allergies   Current Outpatient Medications   Medication Sig Dispense Refill   ? aspirin 81 mg chewable tablet Chew 1 tablet (81 mg total) daily.  0   ? atorvastatin (LIPITOR) 80 MG tablet Take 1 tablet (80 mg total) by mouth daily. 30 tablet 11   ? carvedilol (COREG) 12.5 MG tablet Take 1 tablet (12.5 mg total) by mouth 2 (two) times a day with meals. 60 tablet 11   ? furosemide (LASIX) 20 MG tablet Take 0.5 tablets (10 mg total) by mouth daily. (Patient taking differently: Take 10 mg by mouth every other day .      ) 60 tablet 11   ? lisinopril (PRINIVIL,ZESTRIL) 2.5 MG tablet Take 1 tablet (2.5 mg total) by mouth daily. Take 2.5 mg + 5 mg of Lisinopril (total 7.5 mg) daily 30 tablet 11   ? lisinopril (PRINIVIL,ZESTRIL) 5 MG tablet Take 1 tablet (5 mg total) by mouth daily Take 5 mg +2.5 mg (7.5 mg daily). 30 tablet 11   ? ticagrelor (BRILINTA) 90 mg Tab Take 1 tablet (90 mg total) by mouth 2 (two) times a day. 60 tablet 11   ? acetaminophen (TYLENOL) 650 MG CR  tablet Take 650 mg by mouth every 8 (eight) hours as needed for pain.      ? albuterol (PROAIR HFA;PROVENTIL HFA;VENTOLIN HFA) 90 mcg/actuation inhaler Inhale 2 puffs every 6 (six) hours as needed for wheezing.       No current facility-administered medications for this visit.       Allergies   Allergen Reactions   ? No Known Drug Allergies          Lab Results    Chemistry CBC BNP   Lab Results   Component Value Date    CREATININE 1.13 08/07/2018    BUN 28 (H) 08/07/2018     08/07/2018    K 4.7 08/07/2018     08/07/2018    CO2 23 08/07/2018     Creatinine (mg/dL)   Date Value   08/07/2018 1.13   07/06/2018 1.13   06/05/2018 0.91   04/07/2018 0.95    Lab Results   Component Value Date    WBC 12.0 (H) 07/06/2018    HGB 12.0 (L) 07/06/2018    HCT 36.4 (L) 07/06/2018    MCV 84 07/06/2018     07/06/2018    Lab Results   Component Value Date    BNP 1,000 (H) 04/07/2018     BNP (pg/mL)   Date Value   04/07/2018 1,000 (H)   04/05/2018 1,218 (H)        Eliazar Do CNP  [facility]   Heart Failure Clinic         This note has been dictated using voice recognition software. Any grammatical, typographical, or context distortions are unintentional and inherent to the software

## 2023-03-31 ENCOUNTER — TELEPHONE (OUTPATIENT)
Dept: CARDIOLOGY | Facility: CLINIC | Age: 58
End: 2023-03-31

## 2023-03-31 NOTE — TELEPHONE ENCOUNTER
Multiple attempts have been made to contact pt via phone and was mailed a physical letter requesting CB  Unable to reach pt, and pt has not returned PCs  Order from January for ICD generator change out asap/time sensitive were placed and needing to be scheduled  VM left on pts emergency contact wife Riya to return the call  Msg sent to device inquiring if they have any record of pt transferring care or having this done at another facility  Will await response back, schedule or cnx roder accordingly  3/31/2023 1:57 PM  Azucena Wong RN

## 2023-03-31 NOTE — TELEPHONE ENCOUNTER
Lin-  In this event do you have any insight on how/who [employer] can address this pts concerns and valid issue?  Thanks,  Hernan Salomon MD Miron, Wendy, SANTOSH 6 minutes ago (2:51 PM)     RAMIRO  Nati, last time I saw him was May 2022 at which point time he did not have any insurance in any event.  I do not have an answer for you, his point is well taken that if it is a  issue, then potentially  should bear some of the cost for generator change out.   Sahara Mccord, SANTOSH routed conversation to [facility] Ep Support Pool - Lhe; Jasiel Vu MD; Hernan Israel MD 13 minutes ago (2:44 PM)     Sahara Mcmanus RN 15 minutes ago (2:42 PM)       3/31/23: Spoke with Ashwin on ICD needing replacement. Reviewed Chatty  recommendations for generator change out. Patient verbalized understanding, but states he doesn't have insurance and cannot afford the cost of replacement. He states this is a [place] issue that placed a faulty capacitor. Message routed back to team, Dr. Vu and Dr. Israel. Sahara Mcmanus, SANTOSH

## 2023-03-31 NOTE — PROGRESS NOTES
3/31/23: Spoke with Ashwin on ICD needing replacement. Reviewed eshtery  recommendations for generator change out. Patient verbalized understanding, but states he doesn't have insurance and cannot afford the cost of replacement. He states this is a [place] issue that placed a faulty capacitor. Message routed back to team, Dr. Vu and Dr. Israel. aShara Mcmanus, RN

## 2023-09-12 ENCOUNTER — DOCUMENTATION ONLY (OUTPATIENT)
Dept: CARDIOLOGY | Facility: CLINIC | Age: 58
End: 2023-09-12

## 2023-09-12 NOTE — PROGRESS NOTES
9/12/23: 9/12/23: pt stated that his defibrillator stopped working 10.26.2022 and since he does not have insurance he will not be able to have it fixed/replaced until he does. Pt states the compositor is bad. Pt will contact [facility] when he obtains insurance again.  Osiris Christian, Device RN

## 2023-11-25 ENCOUNTER — HEALTH MAINTENANCE LETTER (OUTPATIENT)
Age: 58
End: 2023-11-25

## 2024-04-04 DIAGNOSIS — I25.83 CORONARY ARTERY DISEASE DUE TO LIPID RICH PLAQUE: ICD-10-CM

## 2024-04-04 DIAGNOSIS — I50.22 CHRONIC SYSTOLIC HEART FAILURE (H): ICD-10-CM

## 2024-04-04 DIAGNOSIS — I25.10 CORONARY ARTERY DISEASE DUE TO LIPID RICH PLAQUE: ICD-10-CM

## 2024-04-04 DIAGNOSIS — I25.5 ISCHEMIC CARDIOMYOPATHY: ICD-10-CM

## 2024-04-08 RX ORDER — LISINOPRIL 5 MG/1
5 TABLET ORAL DAILY
Qty: 90 TABLET | Refills: 0 | Status: SHIPPED | OUTPATIENT
Start: 2024-04-08 | End: 2024-05-24

## 2024-04-08 RX ORDER — ATORVASTATIN CALCIUM 80 MG/1
80 TABLET, FILM COATED ORAL DAILY
Qty: 90 TABLET | Refills: 0 | Status: SHIPPED | OUTPATIENT
Start: 2024-04-08 | End: 2024-05-24

## 2024-04-08 RX ORDER — LISINOPRIL 2.5 MG/1
2.5 TABLET ORAL DAILY
Qty: 90 TABLET | Refills: 0 | Status: SHIPPED | OUTPATIENT
Start: 2024-04-08 | End: 2024-05-24

## 2024-04-08 RX ORDER — CARVEDILOL 25 MG/1
25 TABLET ORAL 2 TIMES DAILY WITH MEALS
Qty: 180 TABLET | Refills: 0 | Status: SHIPPED | OUTPATIENT
Start: 2024-04-08 | End: 2024-05-24

## 2024-05-24 ENCOUNTER — OFFICE VISIT (OUTPATIENT)
Dept: CARDIOLOGY | Facility: CLINIC | Age: 59
End: 2024-05-24

## 2024-05-24 VITALS
RESPIRATION RATE: 14 BRPM | DIASTOLIC BLOOD PRESSURE: 66 MMHG | WEIGHT: 189 LBS | BODY MASS INDEX: 29.16 KG/M2 | HEART RATE: 64 BPM | SYSTOLIC BLOOD PRESSURE: 113 MMHG

## 2024-05-24 DIAGNOSIS — I10 BENIGN ESSENTIAL HYPERTENSION: ICD-10-CM

## 2024-05-24 DIAGNOSIS — I25.83 CORONARY ARTERY DISEASE DUE TO LIPID RICH PLAQUE: Primary | ICD-10-CM

## 2024-05-24 DIAGNOSIS — I50.22 CHRONIC SYSTOLIC HEART FAILURE (H): ICD-10-CM

## 2024-05-24 DIAGNOSIS — E78.00 PURE HYPERCHOLESTEROLEMIA: ICD-10-CM

## 2024-05-24 DIAGNOSIS — Z95.810 ICD (IMPLANTABLE CARDIOVERTER-DEFIBRILLATOR), SINGLE, IN SITU: ICD-10-CM

## 2024-05-24 DIAGNOSIS — I25.5 ISCHEMIC CARDIOMYOPATHY: ICD-10-CM

## 2024-05-24 DIAGNOSIS — I25.10 CORONARY ARTERY DISEASE DUE TO LIPID RICH PLAQUE: Primary | ICD-10-CM

## 2024-05-24 PROCEDURE — 99214 OFFICE O/P EST MOD 30 MIN: CPT | Performed by: INTERNAL MEDICINE

## 2024-05-24 PROCEDURE — G2211 COMPLEX E/M VISIT ADD ON: HCPCS | Performed by: INTERNAL MEDICINE

## 2024-05-24 RX ORDER — LISINOPRIL 5 MG/1
5 TABLET ORAL DAILY
Qty: 90 TABLET | Refills: 4 | Status: SHIPPED | OUTPATIENT
Start: 2024-07-10

## 2024-05-24 RX ORDER — ATORVASTATIN CALCIUM 80 MG/1
80 TABLET, FILM COATED ORAL DAILY
Qty: 90 TABLET | Refills: 4 | Status: SHIPPED | OUTPATIENT
Start: 2024-07-10

## 2024-05-24 RX ORDER — CARVEDILOL 25 MG/1
25 TABLET ORAL 2 TIMES DAILY WITH MEALS
Qty: 180 TABLET | Refills: 4 | Status: SHIPPED | OUTPATIENT
Start: 2024-07-10

## 2024-05-24 RX ORDER — LISINOPRIL 2.5 MG/1
2.5 TABLET ORAL DAILY
Qty: 90 TABLET | Refills: 4 | Status: SHIPPED | OUTPATIENT
Start: 2024-07-10

## 2024-05-24 NOTE — PATIENT INSTRUCTIONS
Mr Daniele Becker,  I enjoyed visiting with you again today.  I am glad to hear you are doing well.  Per our conversation keep up the same meds.  I will plan on seeing you 1 year.  Hernan Israel

## 2024-05-24 NOTE — LETTER
5/24/2024    Physician No Ref-Primary  No address on file    RE: Daniele Becker       Dear Colleague,     I had the pleasure of seeing Daniele Becker in the [facility].      [facility]  Heart Care Clinic Follow-up Note    Assessment & Plan          (I25.10,  I25.83) Coronary artery disease due to lipid rich plaque  (primary encounter diagnosis)  Comment: Angiography April 2018 showed normal left main, left anterior descending with a proximal total occlusion, normal circumflex and normal right coronary artery.  No viability thus no  procedure.     (I25.5) Ischemic cardiomyopathy  Comment: Ejection fraction on MRI 21%.  Received ICD July 6, 2018.  On appropriate lisinopril and carvedilol at maximum tolerated dose.  Also has an ICD in place.  Most recent echo showed ejection fraction of 40-45% from 2022.     (I50.22) Chronic systolic heart failure (H)  Comment: No significant signs or symptoms currently and no need for furosemide.  Last elevated BNP was in 2018.     (I10) Benign essential hypertension  Comment: Under good control currently on carvedilol as well as lisinopril.     (E78.00) Pure hypercholesterolemia  Comment: Total cholesterol 157 with LDL of 113, on statin.  Doing well.     (Z95.810) ICD (implantable cardioverter-defibrillator), single, in situ  Comment: [employer] device with [employer] right ventricular lead.  Device check September 2020 showed 7 seconds of VT, most recent device check September 2021 showed no arrhythmia.  He did hear a beeping on device, most recent device check in 2023 suggested MARTIR.  Beeping has stopped so most likely the device is EOS, meaning it is most likely depleted battery and ineffective.  Given only slightly diminished ejection fraction would not pursue device change out.      Plan  1.  Consider for enrollment in one of our heart failure studies.  2.  Continue current medications, renewed.  3.  Follow-up with me in 1 year or  sooner if needed.    Subjective  CC: 58-year-old white gentleman being seen in 2-year follow-up.  Still living independently with the wife and son.  They both smoke.  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  Physically active with this.  Denies any chest pains, palpitations, shortness of breath, PND, orthopnea, syncope, dizziness or peripheral edema.    Medications  Current Outpatient Medications   Medication Sig Dispense Refill    aspirin 81 mg chewable tablet [ASPIRIN 81 MG CHEWABLE TABLET] Chew 1 tablet (81 mg total) daily.  0    [START ON 7/10/2024] atorvastatin (LIPITOR) 80 MG tablet Take 1 tablet (80 mg) by mouth daily 90 tablet 4    [START ON 7/10/2024] carvedilol (COREG) 25 MG tablet Take 1 tablet (25 mg) by mouth 2 times daily (with meals) 180 tablet 4    [START ON 7/10/2024] lisinopril (ZESTRIL) 2.5 MG tablet Take 1 tablet (2.5 mg) by mouth daily 90 tablet 4    [START ON 7/10/2024] lisinopril (ZESTRIL) 5 MG tablet Take 1 tablet (5 mg) by mouth daily 90 tablet 4    naproxen sodium 220 MG capsule Take 220 mg by mouth as needed      furosemide (LASIX) 20 MG tablet [FUROSEMIDE (LASIX) 20 MG TABLET] Take 0.5 tablets (10 mg total) by mouth as needed (For fluid retention, sob). (Patient not taking: Reported on 1/16/2023) 30 tablet 1       Objective  /66 (BP Location: Right arm, Patient Position: Sitting, Cuff Size: Adult Regular)   Pulse 64   Resp 14   Wt 85.7 kg (189 lb)   BMI 29.16 kg/m      General Appearance:    Alert, cooperative, no distress, appears stated age   Head:    Normocephalic, without obvious abnormality, atraumatic   Throat:   Lips, mucosa, and tongue normal; teeth and gums normal   Neck:   Supple, symmetrical, trachea midline, no adenopathy;        thyroid:  No enlargement/tenderness/nodules; no carotid    bruit or JVD   Back:     Symmetric, no curvature, ROM normal, no CVA tenderness   Lungs:     Clear to auscultation bilaterally, respirations unlabored   Chest wall:    No  "tenderness or deformity   Heart:    Regular rate and rhythm, S1 and S2 normal, no murmur, rub   or gallop   Abdomen:     Soft, non-tender, bowel sounds active all four quadrants,     no masses, no organomegaly   Extremities:   Normal, atraumatic, no cyanosis or edema   Pulses:   2+ and symmetric all extremities   Skin:   Skin color, texture, turgor normal, no rashes or lesions     Results    Lab Results personally reviewed   Lab Results   Component Value Date    CHOL 157 04/06/2018    CHOL 231 (H) 09/17/2012     Lab Results   Component Value Date    HDL 27 (L) 04/06/2018    HDL 34 (L) 09/17/2012     No components found for: \"LDLCALC\"  Lab Results   Component Value Date    TRIG 86 04/06/2018     Lab Results   Component Value Date    WBC 9.8 10/22/2022    HGB 16.8 10/22/2022    HCT 49.7 10/22/2022     10/22/2022     Lab Results   Component Value Date    BUN 19.0 10/22/2022    BUN 19.0 10/22/2022     (L) 10/22/2022     10/22/2022    CO2 25 10/22/2022    CO2 22 10/22/2022             Thank you for allowing me to participate in the care of your patient.      Sincerely,     FERNANDO ISRAEL MD     [facility]  cc:   Caity Israel MD  [address], [address]  [place] [address]      "

## 2024-05-24 NOTE — PROGRESS NOTES
[facility]  Heart Care Clinic Follow-up Note    Assessment & Plan          (I25.10,  I25.83) Coronary artery disease due to lipid rich plaque  (primary encounter diagnosis)  Comment: Angiography April 2018 showed normal left main, left anterior descending with a proximal total occlusion, normal circumflex and normal right coronary artery.  No viability thus no  procedure.     (I25.5) Ischemic cardiomyopathy  Comment: Ejection fraction on MRI 21%.  Received ICD July 6, 2018.  On appropriate lisinopril and carvedilol at maximum tolerated dose.  Also has an ICD in place.  Most recent echo showed ejection fraction of 40-45% from 2022.     (I50.22) Chronic systolic heart failure (H)  Comment: No significant signs or symptoms currently and no need for furosemide.  Last elevated BNP was in 2018.     (I10) Benign essential hypertension  Comment: Under good control currently on carvedilol as well as lisinopril.     (E78.00) Pure hypercholesterolemia  Comment: Total cholesterol 157 with LDL of 113, on statin.  Doing well.     (Z95.810) ICD (implantable cardioverter-defibrillator), single, in situ  Comment: [employer] device with [employer] right ventricular lead.  Device check September 2020 showed 7 seconds of VT, most recent device check September 2021 showed no arrhythmia.  He did hear a beeping on device, most recent device check in 2023 suggested MARTIR.  Beeping has stopped so most likely the device is EOS, meaning it is most likely depleted battery and ineffective.  Given only slightly diminished ejection fraction would not pursue device change out.      Plan  1.  Consider for enrollment in one of our heart failure studies.  2.  Continue current medications, renewed.  3.  Follow-up with me in 1 year or sooner if needed.    Subjective  CC: 58-year-old white gentleman being seen in 2-year follow-up.  Still living independently with the wife and son.  They both smoke.  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  Denies any chest pains, palpitations, shortness of breath, PND, orthopnea, syncope, dizziness or peripheral edema.    Medications  Current Outpatient Medications   Medication Sig Dispense Refill    aspirin 81 mg chewable tablet [ASPIRIN 81 MG CHEWABLE TABLET] Chew 1 tablet (81 mg total) daily.  0    [START ON 7/10/2024] atorvastatin (LIPITOR) 80 MG tablet Take 1 tablet (80 mg) by mouth daily 90 tablet 4    [START ON 7/10/2024] carvedilol (COREG) 25 MG tablet Take 1 tablet (25 mg) by mouth 2 times daily (with meals) 180 tablet 4    [START ON 7/10/2024] lisinopril (ZESTRIL) 2.5 MG tablet Take 1 tablet (2.5 mg) by mouth daily 90 tablet 4    [START ON 7/10/2024] lisinopril (ZESTRIL) 5 MG tablet Take 1 tablet (5 mg) by mouth daily 90 tablet 4    naproxen sodium 220 MG capsule Take 220 mg by mouth as needed      furosemide (LASIX) 20 MG tablet [FUROSEMIDE (LASIX) 20 MG TABLET] Take 0.5 tablets (10 mg total) by mouth as needed (For fluid retention, sob). (Patient not taking: Reported on 1/16/2023) 30 tablet 1       Objective  /66 (BP Location: Right arm, Patient Position: Sitting, Cuff Size: Adult Regular)   Pulse 64   Resp 14   Wt 85.7 kg (189 lb)   BMI 29.16 kg/m      General Appearance:    Alert, cooperative, no distress, appears stated age   Head:    Normocephalic, without obvious abnormality, atraumatic   Throat:   Lips, mucosa, and tongue normal; teeth and gums normal   Neck:   Supple, symmetrical, trachea midline, no adenopathy;        thyroid:  No enlargement/tenderness/nodules; no carotid    bruit or JVD   Back:     Symmetric, no curvature, ROM normal, no CVA tenderness   Lungs:     Clear to auscultation bilaterally, respirations unlabored   Chest wall:    No tenderness or deformity   Heart:    Regular rate and rhythm, S1 and S2 normal, no murmur, rub   or gallop   Abdomen:     Soft, non-tender, bowel sounds active all four quadrants,     no masses,  "no organomegaly   Extremities:   Normal, atraumatic, no cyanosis or edema   Pulses:   2+ and symmetric all extremities   Skin:   Skin color, texture, turgor normal, no rashes or lesions     Results    Lab Results personally reviewed   Lab Results   Component Value Date    CHOL 157 04/06/2018    CHOL 231 (H) 09/17/2012     Lab Results   Component Value Date    HDL 27 (L) 04/06/2018    HDL 34 (L) 09/17/2012     No components found for: \"LDLCALC\"  Lab Results   Component Value Date    TRIG 86 04/06/2018     Lab Results   Component Value Date    WBC 9.8 10/22/2022    HGB 16.8 10/22/2022    HCT 49.7 10/22/2022     10/22/2022     Lab Results   Component Value Date    BUN 19.0 10/22/2022    BUN 19.0 10/22/2022     (L) 10/22/2022     10/22/2022    CO2 25 10/22/2022    CO2 22 10/22/2022         "

## 2025-01-04 ENCOUNTER — HEALTH MAINTENANCE LETTER (OUTPATIENT)
Age: 60
End: 2025-01-04

## 2025-07-09 DIAGNOSIS — I25.5 ISCHEMIC CARDIOMYOPATHY: ICD-10-CM

## 2025-07-09 DIAGNOSIS — I50.22 CHRONIC SYSTOLIC HEART FAILURE (H): ICD-10-CM

## 2025-07-09 DIAGNOSIS — I25.10 CORONARY ARTERY DISEASE DUE TO LIPID RICH PLAQUE: ICD-10-CM

## 2025-07-09 DIAGNOSIS — I25.83 CORONARY ARTERY DISEASE DUE TO LIPID RICH PLAQUE: ICD-10-CM

## 2025-07-10 RX ORDER — ATORVASTATIN CALCIUM 80 MG/1
80 TABLET, FILM COATED ORAL DAILY
Qty: 30 TABLET | Refills: 0 | Status: SHIPPED | OUTPATIENT
Start: 2025-07-10

## 2025-07-10 RX ORDER — LISINOPRIL 2.5 MG/1
2.5 TABLET ORAL DAILY
Qty: 30 TABLET | Refills: 0 | Status: SHIPPED | OUTPATIENT
Start: 2025-07-10

## 2025-07-10 RX ORDER — LISINOPRIL 5 MG/1
5 TABLET ORAL DAILY
Qty: 30 TABLET | Refills: 0 | Status: SHIPPED | OUTPATIENT
Start: 2025-07-10

## 2025-07-10 RX ORDER — CARVEDILOL 25 MG/1
25 TABLET ORAL 2 TIMES DAILY WITH MEALS
Qty: 60 TABLET | Refills: 0 | Status: SHIPPED | OUTPATIENT
Start: 2025-07-10